# Patient Record
Sex: FEMALE | Race: WHITE | NOT HISPANIC OR LATINO | Employment: FULL TIME | URBAN - METROPOLITAN AREA
[De-identification: names, ages, dates, MRNs, and addresses within clinical notes are randomized per-mention and may not be internally consistent; named-entity substitution may affect disease eponyms.]

---

## 2018-08-01 ENCOUNTER — AMBULATORY - HEALTHEAST (OUTPATIENT)
Dept: MULTI SPECIALTY CLINIC | Facility: CLINIC | Age: 28
End: 2018-08-01

## 2018-08-01 ENCOUNTER — TRANSFERRED RECORDS (OUTPATIENT)
Dept: HEALTH INFORMATION MANAGEMENT | Facility: CLINIC | Age: 28
End: 2018-08-01

## 2018-08-01 LAB
PAP SMEAR - HIM PATIENT REPORTED: NEGATIVE
PAP SMEAR - HIM PATIENT REPORTED: NORMAL

## 2019-05-07 ENCOUNTER — OFFICE VISIT (OUTPATIENT)
Dept: FAMILY MEDICINE | Facility: CLINIC | Age: 29
End: 2019-05-07
Payer: COMMERCIAL

## 2019-05-07 VITALS
BODY MASS INDEX: 23 KG/M2 | OXYGEN SATURATION: 99 % | RESPIRATION RATE: 16 BRPM | WEIGHT: 125 LBS | SYSTOLIC BLOOD PRESSURE: 110 MMHG | HEIGHT: 62 IN | TEMPERATURE: 98 F | DIASTOLIC BLOOD PRESSURE: 61 MMHG | HEART RATE: 59 BPM

## 2019-05-07 DIAGNOSIS — L30.9 DERMATITIS: Primary | ICD-10-CM

## 2019-05-07 PROCEDURE — 99213 OFFICE O/P EST LOW 20 MIN: CPT | Performed by: NURSE PRACTITIONER

## 2019-05-07 RX ORDER — TRIAMCINOLONE ACETONIDE 1 MG/G
CREAM TOPICAL 2 TIMES DAILY
Qty: 15 G | Refills: 0 | Status: SHIPPED | OUTPATIENT
Start: 2019-05-07 | End: 2022-07-25

## 2019-05-07 RX ORDER — MUPIROCIN 20 MG/G
OINTMENT TOPICAL 3 TIMES DAILY
Qty: 1 G | Refills: 0 | Status: SHIPPED | OUTPATIENT
Start: 2019-05-07 | End: 2022-07-25

## 2019-05-07 ASSESSMENT — MIFFLIN-ST. JEOR: SCORE: 1255.25

## 2019-05-07 NOTE — PROGRESS NOTES
"  SUBJECTIVE:   Kate Coelho is a 29 year old female who presents to clinic today for the following   health issues:        Rash      Duration: Feb 20109    Description  Location: upper lip  Itching: mild    Intensity:  moderate    Accompanying signs and symptoms: None    History (similar episodes/previous evaluation): None    Some crusting occasionally, yellow tinged.    Dry and scaley  Some itching  No pain or tingling  Never blistered.    No fevers  No recent travel or ill exposures  No new lotions or soaps.     Moved here from [place] for  grad school.   Last PE in [place] 1 years ago.  PAP normal.    No meds daily.    Generally healthy        Additional history: as documented    Reviewed  and updated as needed this visit by clinical staff  Tobacco  Allergies  Meds  Problems  Med Hx  Surg Hx  Fam Hx  Soc Hx          Reviewed and updated as needed this visit by Provider  Tobacco  Allergies  Meds  Problems  Med Hx  Surg Hx  Fam Hx         ROS:  Constitutional, HEENT, cardiovascular, pulmonary, GI, , musculoskeletal, neuro, skin, endocrine and psych systems are negative, except as otherwise noted.    OBJECTIVE:     /61   Pulse 59   Temp 98  F (36.7  C) (Oral)   Resp 16   Ht 1.575 m (5' 2\")   Wt 56.7 kg (125 lb)   SpO2 99%   BMI 22.86 kg/m    Body mass index is 22.86 kg/m .  GENERAL: healthy, alert and no distress  RESP: lungs clear to auscultation - no rales, rhonchi or wheezes  CV: regular rate and rhythm, normal S1 S2, no S3 or S4, no murmur, click or rub, no peripheral edema and peripheral pulses strong  MS: no gross musculoskeletal defects noted, no edema  SKIN: pale pink macular patch on right lateral upper lip with flaking and excoriation      ASSESSMENT/PLAN:       ICD-10-CM    1. Dermatitis L30.9 mupirocin (BACTROBAN) 2 % external ointment     triamcinolone (KENALOG) 0.1 % external cream       Patient Instructions   Try antibiotic cream 1 tablet topically near mouth 3 times daily " x 1 week.    If ineffective stop this and start topical steroid 1 tablet topically near  mouth 3 times daily x 1 week.    If neither of these works will refer to dermatology for further eval.            YANET Andres CNP  [facility]

## 2019-05-07 NOTE — PATIENT INSTRUCTIONS
Try antibiotic cream 1 tablet topically near mouth 3 times daily x 1 week.    If ineffective stop this and start topical steroid 1 tablet topically near  mouth 3 times daily x 1 week.    If neither of these works will refer to dermatology for further eval.

## 2019-06-18 ENCOUNTER — TELEPHONE (OUTPATIENT)
Dept: FAMILY MEDICINE | Facility: CLINIC | Age: 29
End: 2019-06-18

## 2019-06-18 DIAGNOSIS — L30.9 DERMATITIS: Primary | ICD-10-CM

## 2019-06-19 ENCOUNTER — TRANSFERRED RECORDS (OUTPATIENT)
Dept: HEALTH INFORMATION MANAGEMENT | Facility: CLINIC | Age: 29
End: 2019-06-19

## 2019-10-28 ENCOUNTER — COMMUNICATION - HEALTHEAST (OUTPATIENT)
Dept: TELEHEALTH | Facility: CLINIC | Age: 29
End: 2019-10-28

## 2019-10-28 ENCOUNTER — OFFICE VISIT - HEALTHEAST (OUTPATIENT)
Dept: FAMILY MEDICINE | Facility: CLINIC | Age: 29
End: 2019-10-28

## 2019-10-28 DIAGNOSIS — R39.15 URGENCY OF URINATION: ICD-10-CM

## 2019-10-28 DIAGNOSIS — Z00.00 WELL FEMALE EXAM WITHOUT GYNECOLOGICAL EXAM: ICD-10-CM

## 2019-10-28 LAB
ALBUMIN UR-MCNC: ABNORMAL MG/DL
APPEARANCE UR: CLEAR
BILIRUB UR QL STRIP: ABNORMAL
COLOR UR AUTO: YELLOW
GLUCOSE UR STRIP-MCNC: NEGATIVE MG/DL
HGB UR QL STRIP: NEGATIVE
KETONES UR STRIP-MCNC: ABNORMAL MG/DL
LEUKOCYTE ESTERASE UR QL STRIP: ABNORMAL
NITRATE UR QL: NEGATIVE
PH UR STRIP: 6 [PH] (ref 5–8)
SP GR UR STRIP: >=1.03 (ref 1–1.03)
UROBILINOGEN UR STRIP-ACNC: ABNORMAL

## 2019-10-28 ASSESSMENT — MIFFLIN-ST. JEOR: SCORE: 1249.36

## 2019-10-29 LAB — BACTERIA SPEC CULT: NO GROWTH

## 2020-04-22 ENCOUNTER — COMMUNICATION - HEALTHEAST (OUTPATIENT)
Dept: SCHEDULING | Facility: CLINIC | Age: 30
End: 2020-04-22

## 2020-12-02 ENCOUNTER — COMMUNICATION - HEALTHEAST (OUTPATIENT)
Dept: FAMILY MEDICINE | Facility: CLINIC | Age: 30
End: 2020-12-02
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
## 2021-06-03 VITALS
RESPIRATION RATE: 20 BRPM | WEIGHT: 124.5 LBS | BODY MASS INDEX: 22.91 KG/M2 | HEART RATE: 100 BPM | HEIGHT: 62 IN | SYSTOLIC BLOOD PRESSURE: 118 MMHG | DIASTOLIC BLOOD PRESSURE: 74 MMHG

## 2021-06-07 NOTE — TELEPHONE ENCOUNTER
"Pt reports reports she had an ovarian cyst 2/10/20 and she is having similar symptoms. \"Sharp pain both sides of ribcage, L side more severe. Pt rates pain on L \"7\" for past two hours. Pt reports \"abdomen bloated the way it was before and tenderness in uterine area\".     Advised pt to go to the ER now per protocol due to persistant chest pain.     Pt verbalizes understanding and agrees to plan.     Reason for Disposition    Chest pain lasts > 5 minutes (Exceptions: chest pain occurring > 3 days ago and now asymptomatic; same as previously diagnosed heartburn and has accompanying sour taste in mouth)    Protocols used: CHEST PAIN-A-AH      "

## 2021-06-16 PROBLEM — R19.00 PELVIC MASS: Status: ACTIVE | Noted: 2020-02-10

## 2021-06-16 PROBLEM — R10.2 PELVIC PAIN: Status: ACTIVE | Noted: 2020-02-11

## 2021-07-06 ENCOUNTER — OFFICE VISIT (OUTPATIENT)
Dept: PEDIATRICS | Facility: CLINIC | Age: 31
End: 2021-07-06
Payer: COMMERCIAL

## 2021-07-06 VITALS
OXYGEN SATURATION: 98 % | RESPIRATION RATE: 16 BRPM | WEIGHT: 136.8 LBS | HEART RATE: 74 BPM | BODY MASS INDEX: 25.02 KG/M2 | DIASTOLIC BLOOD PRESSURE: 72 MMHG | TEMPERATURE: 97.1 F | SYSTOLIC BLOOD PRESSURE: 112 MMHG

## 2021-07-06 DIAGNOSIS — L42 PITYRIASIS ROSEA: Primary | ICD-10-CM

## 2021-07-06 PROCEDURE — 99213 OFFICE O/P EST LOW 20 MIN: CPT | Performed by: PHYSICIAN ASSISTANT

## 2021-07-06 NOTE — PROGRESS NOTES
Assessment & Plan     Pityriasis rosea  Discussed in detail. Avoid hot water. May take antihistamines PRN. Call if symptoms persist in one month given upcoming wedding.    DARVIN Aguero [facility] EVI Love is a 31 year old who presents for the following health issues:    HPI     Rash  Onset/Duration: x 1.5 weeks ago  Description  Location: started on back, and is now on torso   Character: linear, red  Itching: moderate  Intensity:  moderate  Progression of Symptoms:  worsening  Accompanying signs and symptoms:   Fever: no  Body aches or joint pain: no  Sore throat symptoms: no  Recent cold symptoms: no  History:           Previous episodes of similar rash: None  New exposures:  None  Recent travel: no  Exposure to similar rash: no  Precipitating or alleviating factors: none   Therapies tried and outcome: none    [place]     Patient getting  on Aug 20    Review of Systems   Constitutional, HEENT, cardiovascular, pulmonary, gi and gu systems are negative, except as otherwise noted.      Objective    /72 (BP Location: Right arm, Patient Position: Sitting, Cuff Size: Adult Regular)   Pulse 74   Temp 97.1  F (36.2  C) (Tympanic)   Resp 16   Wt 62.1 kg (136 lb 12.8 oz)   SpO2 98%   BMI 25.02 kg/m    Body mass index is 25.02 kg/m .  Physical Exam   GENERAL: healthy, alert and no distress  EYES: Eyes grossly normal to inspection, PERRL and conjunctivae and sclerae normal  SKIN: inspection of the left upper back reveals large, mildly erythemic, oval shaped lesion. Multiple smaller lesions of the anterior chest, torso and left back    No results found for this or any previous visit (from the past 24 hour(s)).

## 2021-08-15 ENCOUNTER — HEALTH MAINTENANCE LETTER (OUTPATIENT)
Age: 31
End: 2021-08-15

## 2022-05-21 ENCOUNTER — E-VISIT (OUTPATIENT)
Dept: URGENT CARE | Facility: CLINIC | Age: 32
End: 2022-05-21
Payer: COMMERCIAL

## 2022-05-21 DIAGNOSIS — Z20.822 SUSPECTED COVID-19 VIRUS INFECTION: Primary | ICD-10-CM

## 2022-05-21 PROCEDURE — 99421 OL DIG E/M SVC 5-10 MIN: CPT | Performed by: PHYSICIAN ASSISTANT

## 2022-05-21 NOTE — PATIENT INSTRUCTIONS
Dear Beverly,    Based on your responses, you may have coronavirus (COVID-19). This illness can cause fever, cough and trouble breathing. Many people get a mild case and get better on their own. Some people can get very sick.    Will I be tested for COVID-19?  No since you have already tested positive for COVID.    Return to work guidance:  Please let your workplace manager and staffing office know when your isolation ends. Note: if you tested through [facility], there is no need to report to [facility]. If you did not test through [facility], send a copy of your results to [email or web address]. [place] call [phone], [place] call [phone].     Please visit the Employee COVID-19 Testing Information page on the COVID-19 Sz[employer] site. Here you will find return to work and testing guidance, high and low risk exposure definitions, and frequently asked questions.   [employer] URL: [email or web address]     How can I take care of myself?  Over the counter medications may help with your symptoms such as runny or stuffy nose, cough, chills, or fever.  Talk to your care team about your options.     Some people are at high risk of severe illness (for example, you have a weak immune system, you re 65 years or older, or you have certain medical problems). If your risk is high and your symptoms started in the last 5 to 7 days, we strongly recommend for you to get COVID treatment as soon as possible. Paxlovid, Molnupiravir and the monoclonal antibody treatments are proven safe and effective, make you feel better faster, and prevent hospitalization and death.       To schedule an appointment to discuss COVID treatment, request an appointment on [employer] (select  COVID-19 Treatment ) or call [employer] ([phone]), press 7.      Get lots of rest. Drink extra fluids (unless a doctor has told you not to)    Take Tylenol  (acetaminophen) or ibuprofen for fever or pain. If you have liver or kidney problems, ask your family doctor if it's okay to take Tylenol o ibuprofen    Take over the counter medications for your symptoms, as directed by your doctor. You may also talk to your pharmacist.      If you have other health problems (like cancer, heart failure, an organ transplant or severe kidney disease): Call your specialty clinic if you don't feel better in the next 2 days.    Know when to call 911. Emergency warning signs include:  o Trouble breathing or shortness of breath  o Pain or pressure in the chest that doesn't go away  o Feeling confused like you haven't felt before, or not being able to wake up  o Bluish-colored lips or face    Where can I get more information?    [facility] - About COVID-19: [email or web address]     CDC - What to Do If You're Sick: www.cdc.gov/coronavirus/2019-ncov/about/steps-when-sick.html    CDC - Ending Home Isolation: https://www.cdc.gov/coronavirus/2019-ncov/your-health/quarantine-isolation.html     [facility] clinical trials (COVID-19 research studies): [email or web address]    Below are the COVID-19 hotlines at the [facility] of Health ([facility]). Interpreters are available.  o For health questions: Call 619-906-9193 or 1-875.113.5589 (7 a.m. to 7 p.m.)  o For questions about schools and childcare: Call 331-418-9308 or 1-463.219.1308 (7 a.m. to 7 p.m.)    May 21, 2022  RE:  Kate VAZQUEZ Mel[place]                                                                                                                  [address]   [facility] [address]      To whom it may concern:    I evaluated Kate Coelho on May 21, 2022. Kate Coelho should be excused from work/school.     They should let their workplace manager and staffing office know when their quarantine ends.    We can not give an exact date as it depends on the information below. They  can calculate this on their own or work with their manager/staffing office to calculate this. (For example if they were exposed on 10/04, they would have to quarantine for 14 full days. That would be through 10/18. They could return on 10/19.)    Quarantine Guidelines:      If patient receives a positive COVID-19 test result, they should follow the guidance of those who are giving the results. Usually the return to work is 10 (or in some cases 20 days from symptom onset.) If they work at [employer], they must be cleared by Employee Occupational Health and Safety to return to work.        If patient receives a negative COVID-19 test result and did not have a high risk exposure to someone with a known positive COVID-19 test, they can return to work once they're free of fever for 24 hours without fever-reducing medication and their symptoms are improving or resolved.      If patient receives a negative COVID-19 test and if they had a high risk exposure to someone who has tested positive for COVID-19 then they can return to work 14 days after their last contact with the positive individual    Note: If there is ongoing exposure to the covid positive person, this quarantine period may be longer than 14 days. (For example, if they are continually exposed to their child, who tested positive and cannot isolate from them, then the quarantine of 7-14 days can't start until their child is no longer contagious. This is typically 10 days from onset to the child's symptoms. So the total duration may be 17-24 days in this case.)     Sincerely,  Sohail Yoder PA-C

## 2022-07-22 SDOH — ECONOMIC STABILITY: TRANSPORTATION INSECURITY
IN THE PAST 12 MONTHS, HAS LACK OF TRANSPORTATION KEPT YOU FROM MEETINGS, WORK, OR FROM GETTING THINGS NEEDED FOR DAILY LIVING?: NO

## 2022-07-22 SDOH — ECONOMIC STABILITY: FOOD INSECURITY: WITHIN THE PAST 12 MONTHS, THE FOOD YOU BOUGHT JUST DIDN'T LAST AND YOU DIDN'T HAVE MONEY TO GET MORE.: NEVER TRUE

## 2022-07-22 SDOH — ECONOMIC STABILITY: TRANSPORTATION INSECURITY
IN THE PAST 12 MONTHS, HAS THE LACK OF TRANSPORTATION KEPT YOU FROM MEDICAL APPOINTMENTS OR FROM GETTING MEDICATIONS?: NO

## 2022-07-22 SDOH — ECONOMIC STABILITY: INCOME INSECURITY: HOW HARD IS IT FOR YOU TO PAY FOR THE VERY BASICS LIKE FOOD, HOUSING, MEDICAL CARE, AND HEATING?: NOT VERY HARD

## 2022-07-22 SDOH — ECONOMIC STABILITY: INCOME INSECURITY: IN THE LAST 12 MONTHS, WAS THERE A TIME WHEN YOU WERE NOT ABLE TO PAY THE MORTGAGE OR RENT ON TIME?: NO

## 2022-07-22 SDOH — HEALTH STABILITY: PHYSICAL HEALTH: ON AVERAGE, HOW MANY MINUTES DO YOU ENGAGE IN EXERCISE AT THIS LEVEL?: 30 MIN

## 2022-07-22 SDOH — ECONOMIC STABILITY: FOOD INSECURITY: WITHIN THE PAST 12 MONTHS, YOU WORRIED THAT YOUR FOOD WOULD RUN OUT BEFORE YOU GOT MONEY TO BUY MORE.: NEVER TRUE

## 2022-07-22 SDOH — HEALTH STABILITY: PHYSICAL HEALTH: ON AVERAGE, HOW MANY DAYS PER WEEK DO YOU ENGAGE IN MODERATE TO STRENUOUS EXERCISE (LIKE A BRISK WALK)?: 5 DAYS

## 2022-07-22 ASSESSMENT — ENCOUNTER SYMPTOMS
JOINT SWELLING: 0
COUGH: 0
HEMATOCHEZIA: 0
DIZZINESS: 0
BREAST MASS: 0
CHILLS: 0
SORE THROAT: 0
DYSURIA: 0
DIARRHEA: 0
PARESTHESIAS: 0
ARTHRALGIAS: 1
EYE PAIN: 0
PALPITATIONS: 0
HEADACHES: 1
SHORTNESS OF BREATH: 0
NERVOUS/ANXIOUS: 0
MYALGIAS: 0
CONSTIPATION: 0
HEMATURIA: 0
NAUSEA: 0
WEAKNESS: 0
FEVER: 0
FREQUENCY: 0
HEARTBURN: 0
ABDOMINAL PAIN: 0

## 2022-07-22 ASSESSMENT — SOCIAL DETERMINANTS OF HEALTH (SDOH)
DO YOU BELONG TO ANY CLUBS OR ORGANIZATIONS SUCH AS CHURCH GROUPS UNIONS, FRATERNAL OR ATHLETIC GROUPS, OR SCHOOL GROUPS?: YES
HOW OFTEN DO YOU GET TOGETHER WITH FRIENDS OR RELATIVES?: ONCE A WEEK
IN A TYPICAL WEEK, HOW MANY TIMES DO YOU TALK ON THE PHONE WITH FAMILY, FRIENDS, OR NEIGHBORS?: MORE THAN THREE TIMES A WEEK
HOW OFTEN DO YOU ATTEND CHURCH OR RELIGIOUS SERVICES?: NEVER

## 2022-07-22 ASSESSMENT — LIFESTYLE VARIABLES
SKIP TO QUESTIONS 9-10: 0
HOW OFTEN DO YOU HAVE SIX OR MORE DRINKS ON ONE OCCASION: LESS THAN MONTHLY
HOW MANY STANDARD DRINKS CONTAINING ALCOHOL DO YOU HAVE ON A TYPICAL DAY: 1 OR 2
AUDIT-C TOTAL SCORE: 2
HOW OFTEN DO YOU HAVE A DRINK CONTAINING ALCOHOL: MONTHLY OR LESS

## 2022-07-25 ENCOUNTER — OFFICE VISIT (OUTPATIENT)
Dept: PEDIATRICS | Facility: CLINIC | Age: 32
End: 2022-07-25
Payer: COMMERCIAL

## 2022-07-25 VITALS
SYSTOLIC BLOOD PRESSURE: 120 MMHG | WEIGHT: 145 LBS | OXYGEN SATURATION: 97 % | RESPIRATION RATE: 16 BRPM | TEMPERATURE: 98.8 F | HEART RATE: 84 BPM | BODY MASS INDEX: 25.69 KG/M2 | DIASTOLIC BLOOD PRESSURE: 68 MMHG | HEIGHT: 63 IN

## 2022-07-25 DIAGNOSIS — M54.2 CHRONIC NECK PAIN: ICD-10-CM

## 2022-07-25 DIAGNOSIS — G89.29 CHRONIC NECK PAIN: ICD-10-CM

## 2022-07-25 DIAGNOSIS — Z12.4 CERVICAL CANCER SCREENING: ICD-10-CM

## 2022-07-25 DIAGNOSIS — Z00.00 ROUTINE GENERAL MEDICAL EXAMINATION AT A HEALTH CARE FACILITY: Primary | ICD-10-CM

## 2022-07-25 PROBLEM — R19.00 PELVIC MASS: Status: RESOLVED | Noted: 2020-02-10 | Resolved: 2022-07-25

## 2022-07-25 PROBLEM — R10.2 PELVIC PAIN: Status: RESOLVED | Noted: 2020-02-11 | Resolved: 2022-07-25

## 2022-07-25 PROCEDURE — 87624 HPV HI-RISK TYP POOLED RSLT: CPT | Performed by: NURSE PRACTITIONER

## 2022-07-25 PROCEDURE — 90715 TDAP VACCINE 7 YRS/> IM: CPT | Performed by: NURSE PRACTITIONER

## 2022-07-25 PROCEDURE — G0124 SCREEN C/V THIN LAYER BY MD: HCPCS | Performed by: PATHOLOGY

## 2022-07-25 PROCEDURE — G0145 SCR C/V CYTO,THINLAYER,RESCR: HCPCS | Performed by: NURSE PRACTITIONER

## 2022-07-25 PROCEDURE — 90471 IMMUNIZATION ADMIN: CPT | Performed by: NURSE PRACTITIONER

## 2022-07-25 PROCEDURE — 99395 PREV VISIT EST AGE 18-39: CPT | Mod: 25 | Performed by: NURSE PRACTITIONER

## 2022-07-25 ASSESSMENT — ENCOUNTER SYMPTOMS
ARTHRALGIAS: 1
CHILLS: 0
ABDOMINAL PAIN: 0
MYALGIAS: 0
HEARTBURN: 0
NERVOUS/ANXIOUS: 0
HEMATOCHEZIA: 0
DIARRHEA: 0
PARESTHESIAS: 0
BREAST MASS: 0
DIZZINESS: 0
SORE THROAT: 0
PALPITATIONS: 0
CONSTIPATION: 0
HEADACHES: 1
WEAKNESS: 0
FREQUENCY: 0
SHORTNESS OF BREATH: 0
DYSURIA: 0
HEMATURIA: 0
FEVER: 0
JOINT SWELLING: 0
COUGH: 0
EYE PAIN: 0
NAUSEA: 0

## 2022-07-25 ASSESSMENT — PAIN SCALES - GENERAL: PAINLEVEL: NO PAIN (0)
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
## 2022-07-27 ENCOUNTER — MYC MEDICAL ADVICE (OUTPATIENT)
Dept: PEDIATRICS | Facility: CLINIC | Age: 32
End: 2022-07-27

## 2022-07-27 LAB
BKR LAB AP GYN ADEQUACY: ABNORMAL
BKR LAB AP GYN INTERPRETATION: ABNORMAL
BKR LAB AP HPV REFLEX: ABNORMAL
BKR LAB AP PREVIOUS ABNORMAL: ABNORMAL
PATH REPORT.COMMENTS IMP SPEC: ABNORMAL
PATH REPORT.COMMENTS IMP SPEC: ABNORMAL
PATH REPORT.RELEVANT HX SPEC: ABNORMAL

## 2022-07-29 LAB
HUMAN PAPILLOMA VIRUS 16 DNA: NEGATIVE
HUMAN PAPILLOMA VIRUS 18 DNA: NEGATIVE
HUMAN PAPILLOMA VIRUS FINAL DIAGNOSIS: ABNORMAL
HUMAN PAPILLOMA VIRUS OTHER HR: POSITIVE

## 2022-08-01 ENCOUNTER — PATIENT OUTREACH (OUTPATIENT)
Dept: PEDIATRICS | Facility: CLINIC | Age: 32
End: 2022-08-01

## 2022-08-01 DIAGNOSIS — R87.611 PAP SMEAR OF CERVIX WITH ASCUS, CANNOT EXCLUDE HGSIL: ICD-10-CM

## 2022-09-18 ENCOUNTER — HEALTH MAINTENANCE LETTER (OUTPATIENT)
Age: 32
End: 2022-09-18

## 2022-10-12 DIAGNOSIS — F41.9 ANXIETY: Primary | ICD-10-CM

## 2022-10-12 RX ORDER — DIAZEPAM 5 MG
5 TABLET ORAL ONCE
Status: DISCONTINUED | OUTPATIENT
Start: 2022-10-12 | End: 2022-12-13

## 2022-10-19 ENCOUNTER — OFFICE VISIT (OUTPATIENT)
Dept: OBGYN | Facility: CLINIC | Age: 32
End: 2022-10-19
Payer: COMMERCIAL

## 2022-10-19 VITALS — SYSTOLIC BLOOD PRESSURE: 134 MMHG | WEIGHT: 152.5 LBS | BODY MASS INDEX: 27.01 KG/M2 | DIASTOLIC BLOOD PRESSURE: 72 MMHG

## 2022-10-19 DIAGNOSIS — R87.611 PAP SMEAR OF CERVIX WITH ASCUS, CANNOT EXCLUDE HGSIL: Primary | ICD-10-CM

## 2022-10-19 LAB — HCG IFA URINE: NEGATIVE

## 2022-10-19 PROCEDURE — 84703 CHORIONIC GONADOTROPIN ASSAY: CPT | Performed by: OBSTETRICS & GYNECOLOGY

## 2022-10-19 PROCEDURE — 88305 TISSUE EXAM BY PATHOLOGIST: CPT | Performed by: PATHOLOGY

## 2022-10-19 PROCEDURE — 57455 BIOPSY OF CERVIX W/SCOPE: CPT | Performed by: OBSTETRICS & GYNECOLOGY

## 2022-10-19 RX ORDER — NAPROXEN 500 MG/1
TABLET ORAL
COMMUNITY
Start: 2022-09-06 | End: 2023-11-14

## 2022-10-19 NOTE — PROGRESS NOTES
32 year old P0 presents for colposcopy.      Indication for procedure:Atypical Squamous Cells, Cannot Exclude HSIL (ASC-H) w/ +HPV other  Prior history of cervical dysplasia: No    First abnormal screening    Prior Colposcopy history: No  Prior LEEP:No  Patient's last menstrual period was 10/01/2022 (exact date).    Tobacco: No  Gardasil vaccination status:Yes   Pregnancy test: Negative      Discussed nature of HPV related infection, natural history and association with cervical dysplasia.  Procedure for colposcopy and biopsy was explained to the patient and consent obtained.  All the patient's questions were answered.    PROCEDURE:  COLPOSCOPY  After a procedural timeout was taken, she was positioned in dorsal lithotomy and a speculum was inserted to allow visualization of the cervix. A 5% acetic acid solution was applied to the ectocervix with large swabs. Lugols solution was also applied.  Colposcopic examination was then undertaken of the cervix, distal vaginal canal and vaginal fornices.    FINDINGS:Physical Exam  Genitourinary:              Procedures      Plan: Specimens labelled and sent to pathology., Will base further treatment on pathology findings. and post biopsy instructions given to patient.      Daniel Muñoz MD

## 2022-10-24 LAB
PATH REPORT.COMMENTS IMP SPEC: NORMAL
PATH REPORT.COMMENTS IMP SPEC: NORMAL
PATH REPORT.FINAL DX SPEC: NORMAL
PATH REPORT.GROSS SPEC: NORMAL
PATH REPORT.MICROSCOPIC SPEC OTHER STN: NORMAL
PATH REPORT.RELEVANT HX SPEC: NORMAL
PHOTO IMAGE: NORMAL

## 2022-10-25 ENCOUNTER — TELEPHONE (OUTPATIENT)
Dept: OBGYN | Facility: CLINIC | Age: 32
End: 2022-10-25

## 2022-10-25 DIAGNOSIS — D06.9 HIGH GRADE SQUAMOUS INTRAEPITHELIAL LESION (HGSIL), GRADE 3 CIN, ON BIOPSY OF CERVIX: Primary | ICD-10-CM

## 2022-10-25 NOTE — TELEPHONE ENCOUNTER
Type of surgery: leep  Location of surgery: [facility]  Date and time of surgery: 11/8/22 @ 7:30 am  Surgeon: Dr. Muñoz  Pre-Op Appt Date: Patient advised to schedule.  Post-Op Appt Date:    Packet sent out: Yes  Pre-cert/Authorization completed:  No  Date: 10/25/22

## 2022-10-25 NOTE — TELEPHONE ENCOUNTER
Patient Name: Kate Coelho   MRN: [id number]   Case#: [id number]   Surgeon(s) and Role:      * Daniel Muñoz MD - Primary   Date requested: * No dates entered *   Location:  OR   Procedure(s):   CONE BIOPSY, CERVIX, USING LOOP ELECTROSURGICAL EXCISION PROCEDURE (LEEP)

## 2022-11-02 ENCOUNTER — OFFICE VISIT (OUTPATIENT)
Dept: PEDIATRICS | Facility: CLINIC | Age: 32
End: 2022-11-02
Payer: COMMERCIAL

## 2022-11-02 VITALS
BODY MASS INDEX: 26.65 KG/M2 | SYSTOLIC BLOOD PRESSURE: 117 MMHG | OXYGEN SATURATION: 99 % | WEIGHT: 150.4 LBS | HEIGHT: 63 IN | HEART RATE: 76 BPM | TEMPERATURE: 97.7 F | DIASTOLIC BLOOD PRESSURE: 72 MMHG | RESPIRATION RATE: 18 BRPM

## 2022-11-02 DIAGNOSIS — Z01.818 PREOPERATIVE EXAMINATION: Primary | ICD-10-CM

## 2022-11-02 DIAGNOSIS — G89.29 CHRONIC NECK PAIN: ICD-10-CM

## 2022-11-02 DIAGNOSIS — M54.2 CHRONIC NECK PAIN: ICD-10-CM

## 2022-11-02 DIAGNOSIS — D06.9 HIGH GRADE SQUAMOUS INTRAEPITHELIAL LESION (HGSIL), GRADE 3 CIN, ON BIOPSY OF CERVIX: ICD-10-CM

## 2022-11-02 LAB — HCG UR QL: NEGATIVE

## 2022-11-02 PROCEDURE — 81025 URINE PREGNANCY TEST: CPT | Performed by: NURSE PRACTITIONER

## 2022-11-02 PROCEDURE — 99213 OFFICE O/P EST LOW 20 MIN: CPT | Performed by: NURSE PRACTITIONER

## 2022-11-02 ASSESSMENT — PAIN SCALES - GENERAL: PAINLEVEL: NO PAIN (0)
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
## 2022-11-08 ENCOUNTER — TRANSFERRED RECORDS (OUTPATIENT)
Dept: HEALTH INFORMATION MANAGEMENT | Facility: CLINIC | Age: 32
End: 2022-11-08

## 2022-11-08 ENCOUNTER — LAB REQUISITION (OUTPATIENT)
Dept: LAB | Facility: CLINIC | Age: 32
End: 2022-11-08
Payer: COMMERCIAL

## 2022-11-08 DIAGNOSIS — D06.9 CARCINOMA IN SITU OF CERVIX, UNSPECIFIED: ICD-10-CM

## 2022-11-08 PROCEDURE — 88307 TISSUE EXAM BY PATHOLOGIST: CPT | Mod: 26 | Performed by: STUDENT IN AN ORGANIZED HEALTH CARE EDUCATION/TRAINING PROGRAM

## 2022-11-08 PROCEDURE — G0416 PROSTATE BIOPSY, ANY MTHD: HCPCS | Mod: 26 | Performed by: STUDENT IN AN ORGANIZED HEALTH CARE EDUCATION/TRAINING PROGRAM

## 2022-11-08 PROCEDURE — 88305 TISSUE EXAM BY PATHOLOGIST: CPT | Mod: TC,ORL | Performed by: OBSTETRICS & GYNECOLOGY

## 2022-11-17 ENCOUNTER — PATIENT OUTREACH (OUTPATIENT)
Dept: OBGYN | Facility: CLINIC | Age: 32
End: 2022-11-17

## 2022-11-17 NOTE — TELEPHONE ENCOUNTER
10/19/22 [place] Bx - KAELA 2-3. Plan LEEP   11/8/22 LEEP Bx - KAELA 2-3, neg margins, ECC negative. Plan cotest in 6 months.

## 2022-12-13 ENCOUNTER — OFFICE VISIT (OUTPATIENT)
Dept: OBGYN | Facility: CLINIC | Age: 32
End: 2022-12-13
Payer: COMMERCIAL

## 2022-12-13 VITALS — SYSTOLIC BLOOD PRESSURE: 136 MMHG | WEIGHT: 155.1 LBS | DIASTOLIC BLOOD PRESSURE: 74 MMHG | BODY MASS INDEX: 27.47 KG/M2

## 2022-12-13 DIAGNOSIS — Z48.89 ENCOUNTER FOR POSTOPERATIVE WOUND CHECK: Primary | ICD-10-CM

## 2022-12-13 DIAGNOSIS — D06.9 CIN III WITH SEVERE DYSPLASIA: ICD-10-CM

## 2022-12-13 PROCEDURE — 99024 POSTOP FOLLOW-UP VISIT: CPT | Performed by: OBSTETRICS & GYNECOLOGY

## 2022-12-13 NOTE — PROGRESS NOTES
Post Op Follow Up    Kate Coelho is here for post op follow up visit following LEEP on 11/8/2022.    Procedure was uncomplicated.  Final pathology demonstrated     Final Diagnosis   A.  Endocervix, curettage:  -Fragments of benign endocervical glands.    B.  Cervix, 6:00 and 7:00, loop electrosurgical excision procedure (LEEP):  -High-grade squamous intraepithelial lesion (KAELA 2-3).  -Surgical margins are negative for dysplasia.         Since surgery patient has had no problems.    Activity, bowel function and bladder function have all returned to normal.    EXAM:  : Normal external genitalia without lesions, vagina neg.  Cervix well healed TZ visible     Follow up in 6 month(s) for Pap/HPV testing    Jorge Muñoz MD

## 2023-05-16 ENCOUNTER — OFFICE VISIT (OUTPATIENT)
Dept: OBGYN | Facility: CLINIC | Age: 33
End: 2023-05-16
Payer: COMMERCIAL

## 2023-05-16 VITALS — DIASTOLIC BLOOD PRESSURE: 70 MMHG | SYSTOLIC BLOOD PRESSURE: 122 MMHG | WEIGHT: 158 LBS | BODY MASS INDEX: 27.99 KG/M2

## 2023-05-16 DIAGNOSIS — D06.9 CIN III WITH SEVERE DYSPLASIA: Primary | ICD-10-CM

## 2023-05-16 PROCEDURE — 88175 CYTOPATH C/V AUTO FLUID REDO: CPT | Performed by: OBSTETRICS & GYNECOLOGY

## 2023-05-16 PROCEDURE — 87624 HPV HI-RISK TYP POOLED RSLT: CPT | Performed by: OBSTETRICS & GYNECOLOGY

## 2023-05-16 PROCEDURE — 99213 OFFICE O/P EST LOW 20 MIN: CPT | Performed by: OBSTETRICS & GYNECOLOGY

## 2023-05-16 NOTE — PROGRESS NOTES
Chief Complaint   Patient presents with     Repeat Pap Smear       Subjective:  33-year-old status post LEEP in November 2022 with final path showing KAELA-2 to 3 with negative margins and a benign ECC.  She presents today for a follow-up Pap 6 months out from her original procedure.      REVIEW OF SYSTEMS:  General: negative    Health Maintenance   Topic Date Due     COVID-19 Vaccine (4 - Pfizer series) 12/10/2021     PHQ-2 (once per calendar year)  01/01/2023     PAP FOLLOW-UP  05/08/2023     HPV FOLLOW-UP  05/08/2023     YEARLY PREVENTIVE VISIT  07/25/2023     ADVANCE CARE PLANNING  07/25/2027     DTAP/TDAP/TD IMMUNIZATION (8 - Td or Tdap) 07/25/2032     INFLUENZA VACCINE  Completed     HEPATITIS B IMMUNIZATION  Completed     Pneumococcal Vaccine: Pediatrics (0 to 5 Years) and At-Risk Patients (6 to 64 Years)  Aged Out     IPV IMMUNIZATION  Aged Out     MENINGITIS IMMUNIZATION  Aged Out     HEPATITIS C SCREENING  Discontinued     HIV SCREENING  Discontinued     PAP  Discontinued       Allergies   Allergen Reactions     Codeine Shortness Of Breath, Nausea and Vomiting and Rash     Doxycycline Dizziness and Rash       Objective:  Vitals: Wt 71.7 kg (158 lb)   BMI 27.99 kg/m    BMI= Body mass index is 27.99 kg/m .    Cervix normal in appearance.  Excellent healing post LEEP.  TZ still visibile.  No stenosis.  Pap/HPV obtained.    Assessment/Plan:  1. KAELA III with severe dysplasia  Status post LEEP in November 2022 with final path showing KAELA 2-3 with negative margins and a benign ECC.  Interval 6-month Pap smear and HPV screen today        Jorge Muñoz MD

## 2023-05-18 LAB
BKR LAB AP GYN ADEQUACY: NORMAL
BKR LAB AP GYN INTERPRETATION: NORMAL
BKR LAB AP HPV REFLEX: NORMAL
BKR LAB AP PREVIOUS ABNL DX: NORMAL
BKR LAB AP PREVIOUS ABNORMAL: NORMAL
PATH REPORT.COMMENTS IMP SPEC: NORMAL
PATH REPORT.COMMENTS IMP SPEC: NORMAL
PATH REPORT.RELEVANT HX SPEC: NORMAL

## 2023-05-22 ENCOUNTER — PATIENT OUTREACH (OUTPATIENT)
Dept: OBGYN | Facility: CLINIC | Age: 33
End: 2023-05-22
Payer: COMMERCIAL

## 2023-05-22 LAB
HUMAN PAPILLOMA VIRUS 16 DNA: NEGATIVE
HUMAN PAPILLOMA VIRUS 18 DNA: NEGATIVE
HUMAN PAPILLOMA VIRUS FINAL DIAGNOSIS: NORMAL
HUMAN PAPILLOMA VIRUS OTHER HR: NEGATIVE

## 2023-06-06 ENCOUNTER — OFFICE VISIT (OUTPATIENT)
Dept: FAMILY MEDICINE | Facility: CLINIC | Age: 33
End: 2023-06-06
Payer: COMMERCIAL

## 2023-06-06 VITALS
OXYGEN SATURATION: 97 % | RESPIRATION RATE: 18 BRPM | HEART RATE: 74 BPM | DIASTOLIC BLOOD PRESSURE: 67 MMHG | SYSTOLIC BLOOD PRESSURE: 118 MMHG | WEIGHT: 159.5 LBS | BODY MASS INDEX: 28.26 KG/M2 | HEIGHT: 63 IN | TEMPERATURE: 98 F

## 2023-06-06 DIAGNOSIS — R09.81 NASAL CONGESTION: ICD-10-CM

## 2023-06-06 DIAGNOSIS — J30.9 ALLERGIC RHINITIS, UNSPECIFIED SEASONALITY, UNSPECIFIED TRIGGER: Primary | ICD-10-CM

## 2023-06-06 PROCEDURE — 99213 OFFICE O/P EST LOW 20 MIN: CPT | Performed by: FAMILY MEDICINE

## 2023-06-06 RX ORDER — FLUTICASONE PROPIONATE 50 MCG
1 SPRAY, SUSPENSION (ML) NASAL DAILY
Qty: 16 G | Refills: 1 | Status: SHIPPED | OUTPATIENT
Start: 2023-06-06 | End: 2024-02-12

## 2023-06-06 RX ORDER — MONTELUKAST SODIUM 10 MG/1
10 TABLET ORAL AT BEDTIME
Qty: 30 TABLET | Refills: 0 | Status: SHIPPED | OUTPATIENT
Start: 2023-06-06 | End: 2023-12-22

## 2023-06-06 ASSESSMENT — ENCOUNTER SYMPTOMS
SINUS PAIN: 1
ACTIVITY CHANGE: 0
AGITATION: 0
RHINORRHEA: 1
ABDOMINAL DISTENTION: 0
SINUS PRESSURE: 1
ABDOMINAL PAIN: 0
APPETITE CHANGE: 1

## 2023-06-06 ASSESSMENT — PAIN SCALES - GENERAL: PAINLEVEL: NO PAIN (0)

## 2023-06-06 NOTE — PROGRESS NOTES
"  Assessment & Plan     Allergic rhinitis, unspecified seasonality, unspecified trigger  - fluticasone (FLONASE) 50 MCG/ACT nasal spray; Spray 1 spray into both nostrils daily  - montelukast (SINGULAIR) 10 MG tablet; Take 1 tablet (10 mg) by mouth At Bedtime    Nasal congestion  TM dysfunction due to nasal congestion, we discussed about using Claritin, Flonase, montelukast, recommend to use steam, normal saline nasal drops.  Recommend to contact if left ear pain due to nasal congestion fails to improve we can consider prednisone.       BMI:   Estimated body mass index is 28.25 kg/m  as calculated from the following:    Height as of this encounter: 1.6 m (5' 3\").    Weight as of this encounter: 72.3 kg (159 lb 8 oz).   Weight management plan: Discussed healthy diet and exercise guidelines      Jaimee Gomez MD  [facility] [place]IO Paul is a 33 year old, presenting for the following health issues:  Ear Problem (Left ear pain. )        6/6/2023     3:52 PM   Additional Questions   Roomed by Frances GEIGER     History of Present Illness       Reason for visit:  Tinnitus and ear pain  Symptom onset:  1-3 days ago  Symptoms include:  Loud ringing, pressure, and pain in left ear  Symptom intensity:  Moderate  Symptom progression:  Staying the same  Had these symptoms before:  No  What makes it worse:  Being in the car and mechanical noises like air conditioners  What makes it better:  Noise canceling earbuds, ibuprofen    She eats 2-3 servings of fruits and vegetables daily.She consumes 0 sweetened beverage(s) daily.She exercises with enough effort to increase her heart rate 20 to 29 minutes per day.  She exercises with enough effort to increase her heart rate 3 or less days per week.   She is taking medications regularly.         Review of Systems   Constitutional: Positive for appetite change. Negative for activity change.   HENT: Positive for congestion, rhinorrhea, sinus pressure and sinus " "pain.    Gastrointestinal: Negative for abdominal distention and abdominal pain.   Psychiatric/Behavioral: Negative for agitation and behavioral problems.          Objective    /67 (BP Location: Right arm, Patient Position: Sitting, Cuff Size: Adult Regular)   Pulse 74   Temp 98  F (36.7  C) (Oral)   Resp 18   Ht 1.6 m (5' 3\")   Wt 72.3 kg (159 lb 8 oz)   LMP 06/02/2023 (Exact Date)   SpO2 97%   BMI 28.25 kg/m    Body mass index is 28.25 kg/m .  Physical Exam  HENT:      Right Ear: Tympanic membrane and external ear normal.      Left Ear: External ear normal.      Ears:      Comments: Bulging TM .  Cardiovascular:      Rate and Rhythm: Normal rate.   Pulmonary:      Effort: Pulmonary effort is normal.   Abdominal:      General: Abdomen is flat.   Psychiatric:         Mood and Affect: Mood normal.                    "

## 2023-10-08 ENCOUNTER — HEALTH MAINTENANCE LETTER (OUTPATIENT)
Age: 33
End: 2023-10-08

## 2023-11-14 ENCOUNTER — OFFICE VISIT (OUTPATIENT)
Dept: URGENT CARE | Facility: URGENT CARE | Age: 33
End: 2023-11-14
Payer: COMMERCIAL

## 2023-11-14 VITALS
TEMPERATURE: 98.1 F | WEIGHT: 160 LBS | DIASTOLIC BLOOD PRESSURE: 82 MMHG | OXYGEN SATURATION: 98 % | HEART RATE: 78 BPM | SYSTOLIC BLOOD PRESSURE: 128 MMHG | BODY MASS INDEX: 28.34 KG/M2 | RESPIRATION RATE: 14 BRPM

## 2023-11-14 DIAGNOSIS — R11.2 NAUSEA AND VOMITING, UNSPECIFIED VOMITING TYPE: Primary | ICD-10-CM

## 2023-11-14 PROCEDURE — 99213 OFFICE O/P EST LOW 20 MIN: CPT | Performed by: PHYSICIAN ASSISTANT

## 2023-11-14 RX ORDER — ONDANSETRON 4 MG/1
4 TABLET, ORALLY DISINTEGRATING ORAL ONCE
Status: COMPLETED | OUTPATIENT
Start: 2023-11-14 | End: 2023-11-14

## 2023-11-14 RX ORDER — ONDANSETRON 4 MG/1
4 TABLET, ORALLY DISINTEGRATING ORAL EVERY 8 HOURS PRN
Qty: 30 TABLET | Refills: 0 | Status: SHIPPED | OUTPATIENT
Start: 2023-11-14 | End: 2023-12-22

## 2023-11-14 RX ADMIN — ONDANSETRON 4 MG: 4 TABLET, ORALLY DISINTEGRATING ORAL at 20:14

## 2023-11-15 NOTE — PROGRESS NOTES
Assessment & Plan     Nausea and vomiting, unspecified vomiting type  Acute problem.  On exam patient is in no acute distress.  Vitals are stable.  Discussed mild gastroenteritis.  She is given 4 mg of Zofran po here today with mild improvement in symptoms.  Zofran prescription sent to the pharmacy.  Recommended pushing fluids.  [place] diet for the next few days.  Will anticipate gradual improvement.  Follow-up if any worsening symptoms.  Patient agrees.  - ondansetron (ZOFRAN ODT) ODT tab 4 mg  - ondansetron (ZOFRAN ODT) 4 MG ODT tab  Dispense: 30 tablet; Refill: 0       Return in about 5 days (around 11/19/2023) for Symptoms failing to improve.    Jayda Doherty PA-C  [facility] [place] Paul is a 33 year old female who presents to clinic today for the following health issues:  Chief Complaint   Patient presents with    Abdominal Pain     Possible food poison, - abdomen pain, HA, vomiting x 130am last night -- could be from home made apple cider  -- whatever she  took she vomited     HPI    Patient is presenting to urgent care today with complaint of vomiting/nausea since early this morning.  Gastro    Onset of symptoms was  1 am this morning  Course of illness is same.    Severity moderate  Current and Associated symptoms:  vomiting, nausea, abdominal cramping, HA  Aggravating factors: eating.    Alleviating factors: nothing  Diarrhea: Yes  1 stools today, non bloody  Stools: loose  Vomitting: Yes   up to 12 times today, non bloody  Appetite: decreased  Risk factors: possible bad food exposure: Homemade apple cider  Denies: sick contacts, travel , recent antibiotic use, recent hospitalization, recent medication changes, hx of IBS, fever.  Treatment tried: rice, crackers.      Review of Systems  Constitutional, HEENT, cardiovascular, pulmonary, GI, , musculoskeletal, neuro, skin, endocrine and psych systems are negative, except as otherwise noted.      Objective    BP  128/82 (BP Location: Right arm, Patient Position: Chair, Cuff Size: Adult Regular)   Pulse 78   Temp 98.1  F (36.7  C) (Oral)   Resp 14   Wt 72.6 kg (160 lb)   LMP 11/06/2023 (Exact Date)   SpO2 98%   BMI 28.34 kg/m    Physical Exam   GENERAL: healthy, alert and no distress  RESP: lungs clear to auscultation - no rales, rhonchi or wheezes  CV: regular rate and rhythm, normal S1 S2  ABDOMEN: soft,  mild generalized TTP in all quadrants, no hepatosplenomegaly, no masses and bowel sounds normal  MS: no gross musculoskeletal defects noted, no edema

## 2023-12-01 ENCOUNTER — IMMUNIZATION (OUTPATIENT)
Dept: FAMILY MEDICINE | Facility: CLINIC | Age: 33
End: 2023-12-01
Payer: COMMERCIAL

## 2023-12-01 PROCEDURE — 91320 SARSCV2 VAC 30MCG TRS-SUC IM: CPT

## 2023-12-01 PROCEDURE — 90480 ADMN SARSCOV2 VAC 1/ONLY CMP: CPT

## 2023-12-01 PROCEDURE — 99207 PR NO CHARGE NURSE ONLY: CPT

## 2023-12-01 NOTE — PROGRESS NOTES
Prior to immunization administration, verified patients identity using patient s name and date of birth. Please see Immunization Activity for additional information.     Screening Questionnaire for Adult Immunization    Are you sick today?   No   Do you have allergies to medications, food, a vaccine component or latex?   No   Have you ever had a serious reaction after receiving a vaccination?   No   Do you have a long-term health problem with heart, lung, kidney, or metabolic disease (e.g., diabetes), asthma, a blood disorder, no spleen, complement component deficiency, a cochlear implant, or a spinal fluid leak?  Are you on long-term aspirin therapy?   No   Do you have cancer, leukemia, HIV/AIDS, or any other immune system problem?   No   Do you have a parent, brother, or sister with an immune system problem?   No   In the past 3 months, have you taken medications that affect  your immune system, such as prednisone, other steroids, or anticancer drugs; drugs for the treatment of rheumatoid arthritis, Crohn s disease, or psoriasis; or have you had radiation treatments?   No   Have you had a seizure, or a brain or other nervous system problem?   No   During the past year, have you received a transfusion of blood or blood    products, or been given immune (gamma) globulin or antiviral drug?   No   For women: Are you pregnant or is there a chance you could become       pregnant during the next month?   No   Have you received any vaccinations in the past 4 weeks?   No     Immunization questionnaire answers were all negative.    I have reviewed the following standing orders:   This patient is due and qualifies for the Covid-19 vaccine.     Click here for COVID-19 Standing Order    I have reviewed the vaccines inclusion and exclusion criteria; No concerns regarding eligibility.     Patient instructed to remain in clinic for 15 minutes afterwards, and to report any adverse reactions.     Screening performed by Isabel  MAIN [place] on 12/1/2023 at 2:08 PM.

## 2023-12-19 ASSESSMENT — ENCOUNTER SYMPTOMS
ARTHRALGIAS: 0
PARESTHESIAS: 0
DIARRHEA: 0
NERVOUS/ANXIOUS: 0
HEMATURIA: 0
WEAKNESS: 0
MYALGIAS: 0
HEADACHES: 1
NAUSEA: 0
ABDOMINAL PAIN: 0
CHILLS: 0
SHORTNESS OF BREATH: 0
FREQUENCY: 0
FEVER: 0
BREAST MASS: 0
JOINT SWELLING: 0
SORE THROAT: 0
HEARTBURN: 1
DIZZINESS: 0
EYE PAIN: 0
CONSTIPATION: 0
PALPITATIONS: 0
HEMATOCHEZIA: 0
DYSURIA: 0
COUGH: 0

## 2023-12-22 ENCOUNTER — OFFICE VISIT (OUTPATIENT)
Dept: PEDIATRICS | Facility: CLINIC | Age: 33
End: 2023-12-22
Payer: COMMERCIAL

## 2023-12-22 VITALS
WEIGHT: 160.2 LBS | BODY MASS INDEX: 28.39 KG/M2 | HEIGHT: 63 IN | TEMPERATURE: 98.8 F | RESPIRATION RATE: 16 BRPM | HEART RATE: 81 BPM | DIASTOLIC BLOOD PRESSURE: 79 MMHG | SYSTOLIC BLOOD PRESSURE: 119 MMHG | OXYGEN SATURATION: 98 %

## 2023-12-22 DIAGNOSIS — G43.E09 CHRONIC MIGRAINE WITH AURA WITHOUT STATUS MIGRAINOSUS, NOT INTRACTABLE: ICD-10-CM

## 2023-12-22 DIAGNOSIS — Z31.69 PRE-CONCEPTION COUNSELING: ICD-10-CM

## 2023-12-22 DIAGNOSIS — Z00.00 ROUTINE GENERAL MEDICAL EXAMINATION AT A HEALTH CARE FACILITY: Primary | ICD-10-CM

## 2023-12-22 DIAGNOSIS — R87.611 ATYPICAL SQUAMOUS CELLS CANNOT EXCLUDE HIGH GRADE SQUAMOUS INTRAEPITHELIAL LESION ON CYTOLOGIC SMEAR OF CERVIX (ASC-H): ICD-10-CM

## 2023-12-22 LAB
CHOLEST SERPL-MCNC: 166 MG/DL
FASTING STATUS PATIENT QL REPORTED: NORMAL
FASTING STATUS PATIENT QL REPORTED: NORMAL
GLUCOSE SERPL-MCNC: 94 MG/DL (ref 70–99)
HDLC SERPL-MCNC: 65 MG/DL
LDLC SERPL CALC-MCNC: 89 MG/DL
NONHDLC SERPL-MCNC: 101 MG/DL
TRIGL SERPL-MCNC: 61 MG/DL

## 2023-12-22 PROCEDURE — 99395 PREV VISIT EST AGE 18-39: CPT | Performed by: NURSE PRACTITIONER

## 2023-12-22 PROCEDURE — 80061 LIPID PANEL: CPT | Performed by: NURSE PRACTITIONER

## 2023-12-22 PROCEDURE — 36415 COLL VENOUS BLD VENIPUNCTURE: CPT | Performed by: NURSE PRACTITIONER

## 2023-12-22 PROCEDURE — 82947 ASSAY GLUCOSE BLOOD QUANT: CPT | Performed by: NURSE PRACTITIONER

## 2023-12-22 ASSESSMENT — ENCOUNTER SYMPTOMS
ARTHRALGIAS: 0
PALPITATIONS: 0
DIARRHEA: 0
DIZZINESS: 0
EYE PAIN: 0
HEMATURIA: 0
CHILLS: 0
PARESTHESIAS: 0
HEARTBURN: 1
MYALGIAS: 0
CONSTIPATION: 0
SORE THROAT: 0
HEADACHES: 1
JOINT SWELLING: 0
DYSURIA: 0
WEAKNESS: 0
FEVER: 0
FREQUENCY: 0
BREAST MASS: 0
NAUSEA: 0
HEMATOCHEZIA: 0
NERVOUS/ANXIOUS: 0
ABDOMINAL PAIN: 0
SHORTNESS OF BREATH: 0
COUGH: 0

## 2023-12-22 ASSESSMENT — PAIN SCALES - GENERAL: PAINLEVEL: NO PAIN (0)
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
## 2024-01-09 ENCOUNTER — MYC MEDICAL ADVICE (OUTPATIENT)
Dept: PEDIATRICS | Facility: CLINIC | Age: 34
End: 2024-01-09
Payer: COMMERCIAL

## 2024-01-10 NOTE — TELEPHONE ENCOUNTER
I reviewed the product in [facility] and it looks like some of the ingredients are listed as unsafe and I would not recommend use.  See below for the specifics on safety of each produce.  Hard with so many ingredients, may do better with individual ingredients not with a blend.    From [employer]  Below is general information about the safety of the known ingredients contained in the product Morning Complete Apple Cinnamon. Some ingredients may not be listed. This information does NOT represent a recommendation for or a test of this specific product as a whole.    +ALFALFA  POSSIBLY SAFE ...when the leaves are used orally and appropriately, short-term (4,6,12).  LIKELY UNSAFE ...when large amounts are used long-term.  PREGNANCY AND LACTATION: POSSIBLY UNSAFE  when used orally in medicinal amounts.  +ALOE (organic Aloe Vera)  LIKELY SAFE ...when aloe gel is used topically and appropriately.  POSSIBLY SAFE ...when aloe gel is used orally and appropriately, short-term.  POSSIBLY UNSAFE ...when aloe latex is used orally.  LIKELY UNSAFE ...when aloe latex is used orally in high doses.  CHILDREN: POSSIBLY SAFE  when aloe gel is used topically and appropriately.  CHILDREN: POSSIBLY UNSAFE  when aloe latex and aloe whole leaf extracts are used orally in children.  PREGNANCY: POSSIBLY UNSAFE  when used orally.  LACTATION: POSSIBLY UNSAFE  when aloe preparations are used orally.  +ASTRAGALUS (Astragalus root extract)  POSSIBLY SAFE ...when used orally and appropriately.  PREGNANCY AND LACTATION:  There is insufficient reliable information in humans.  +BACILLUS COAGULANS (B. coagulans)  POSSIBLY SAFE ...when taken orally and appropriately.  CHILDREN: POSSIBLY SAFE  when taken orally and appropriately.  PREGNANCY AND LACTATION:  Insufficient reliable information available; avoid using.  +BARLEY (Barley Grass)  LIKELY SAFE ...when used orally and appropriately in food amounts  (4819,4820,4821,5104,31694,12604,28350,21277,41159,03727).  PREGNANCY: LIKELY SAFE  when used orally in amounts commonly found in foods (19).  PREGNANCY: POSSIBLY UNSAFE  when barley sprouts are consumed in relatively high doses.  LACTATION:  Insufficient reliable information available; avoid using.  +BERBERINE (Berberine HCl)  POSSIBLY SAFE ...when used orally and appropriately.  CHILDREN: LIKELY UNSAFE  when used orally in newborns.  PREGNANCY: LIKELY UNSAFE  when used orally.  LACTATION: LIKELY UNSAFE  when used orally.  +BIFIDOBACTERIUM BIFIDUM (B. bifidum)  LIKELY SAFE ...when used orally and appropriately.  CHILDREN: LIKELY SAFE  when used orally and appropriately in children of most ages.  PREGNANCY: POSSIBLY SAFE  when Bifidobacterium bifidum is used orally and appropriately, short-term.  LACTATION:  There is insufficient reliable information available about the safety of Bifidobacterium bifidum during lactation.  +BIFIDOBACTERIUM LONGUM (B. longum)  LIKELY SAFE ...when used orally and appropriately.  CHILDREN: LIKELY SAFE  when used orally and appropriately in children of most ages.  PREGNANCY AND LACTATION: POSSIBLY SAFE  when used orally and appropriately, short-term.  +BITTER MELON (Bitter Melon Fruit Extract)  POSSIBLY SAFE ...when the fruit is used orally and appropriately, short-term.  PREGNANCY: POSSIBLY UNSAFE  when used orally.  LACTATION:  Insufficient reliable information available; avoid using.  +BLACK PEPPER  LIKELY SAFE ...when used orally in amounts commonly found in foods.  POSSIBLY SAFE ...when black pepper oil is applied topically.  CHILDREN: LIKELY SAFE  when used orally in amounts commonly found in foods (11).  CHILDREN: POSSIBLY UNSAFE  when used orally in large amounts.  PREGNANCY: LIKELY SAFE  when used orally in amounts commonly found in foods (11).  PREGNANCY: LIKELY UNSAFE  when used orally in large amounts.  LACTATION: LIKELY SAFE  when used orally in amounts commonly found in  foods (11).  +BROCCOLI  LIKELY SAFE ...when used orally in food amounts (03450).  PREGNANCY AND LACTATION: LIKELY SAFE  when used orally in food amounts (11863).  +CEYLON CINNAMON (Cinnamon)  LIKELY SAFE ...when consumed in amounts commonly found in foods.  POSSIBLY SAFE ...when used orally and appropriately in medicinal amounts.  PREGNANCY: LIKELY SAFE  when consumed in amounts commonly found in foods (4912).  PREGNANCY: LIKELY UNSAFE  when used orally in amounts greater than those found in foods.  LACTATION: LIKELY SAFE  when consumed in amounts commonly found in foods (4912).  +CHICORY  LIKELY SAFE ...when consumed in amounts commonly found in food.  POSSIBLY SAFE ...when used orally in medicinal amounts, short-term.  PREGNANCY: POSSIBLY UNSAFE  when used orally in excessive amounts.  LACTATION:  Insufficient reliable information available; avoid using.  +DIINDOLYLMETHANE (DIM)  LIKELY SAFE ...when used orally in amounts commonly found in foods.  POSSIBLY SAFE ...when used orally and appropriately in medicinal doses.  POSSIBLY UNSAFE ...when used orally in doses of 600 mg daily.  CHILDREN: LIKELY SAFE  when used orally in amounts commonly found in foods.  PREGNANCY AND LACTATION: LIKELY SAFE  when used orally in amounts commonly found in foods.  +FENNEL  LIKELY SAFE ...when used orally in amounts commonly found in foods.  POSSIBLY SAFE ...when fennel essential oil or extract is used orally and appropriately, short-term.  CHILDREN: POSSIBLY SAFE  when combination products containing fennel are used to treat colic in infants for up to one week.  PREGNANCY: POSSIBLY UNSAFE  when used orally.  LACTATION: POSSIBLY UNSAFE  when used orally.  +MICHAEL  LIKELY SAFE ...when used orally and appropriately.  POSSIBLY SAFE ...when used topically and appropriately, short-term (99759,77711).  CHILDREN: LIKELY SAFE  when consumed in the amounts typically found in foods.  CHILDREN: POSSIBLY SAFE  when used orally and  appropriately, short-term.  PREGNANCY: LIKELY SAFE  when consumed in the amounts typically found in foods.  PREGNANCY: POSSIBLY SAFE  when used for medicinal purposes.  LACTATION: LIKELY SAFE  when consumed in the amounts typically found in foods.  +GOJI (Lycium)  POSSIBLY SAFE ...when goji fruit preparations are used orally and appropriately, short-term.  PREGNANCY AND LACTATION:  Insufficient reliable information available.  +GREEN TEA (Green Tea leaf extract)  LIKELY SAFE ...when green tea is consumed as a beverage in moderate amounts (733,6031,9222,9223,9225,9226,9227,9228,18052,19783)(16876,57759,71405,57190,09010).  POSSIBLY SAFE ...when green tea extract is used orally.  POSSIBLY UNSAFE ...when consumed as a beverage in large quantities.  LIKELY UNSAFE ...when used orally in very high doses.  CHILDREN: POSSIBLY SAFE  when used orally by children and adolescents in amounts commonly found in foods and beverages (4912,84607).  PREGNANCY: POSSIBLY SAFE  when used orally in moderate amounts.  PREGNANCY: POSSIBLY UNSAFE  when used orally in amounts providing more than 300 mg caffeine daily.  LACTATION: POSSIBLY SAFE  when used orally in moderate amounts.  LACTATION: POSSIBLY UNSAFE  when used orally in large amounts.  +GYMNEMA (Gymnema natasha)  POSSIBLY SAFE ...when used orally and appropriately.  PREGNANCY AND LACTATION:  Insufficient reliable information available; avoid using.  +YOBANY WU (Polygonum cuspidatum root extract)  There is insufficient reliable information available about the safety of yobany wu.  PREGNANCY AND LACTATION:  Insufficient reliable information available; avoid using.  +KALE  LIKELY SAFE ...when used in amounts commonly found in foods.  PREGNANCY AND LACTATION: LIKELY SAFE  when used orally in amounts commonly found in foods.  +LACTICASEIBACILLUS CASEI (L. casei)  LIKELY SAFE ...when used orally and appropriately.  CHILDREN: LIKELY SAFE  when used orally and appropriately in children of  most ages.  PREGNANCY: POSSIBLY SAFE  when used orally and appropriately.  LACTATION:  There is insufficient reliable information available about the safety of Lacticaseibacillus casei during lactation.  +LACTOBACILLUS ACIDOPHILUS (L. gasseri, L. plantarum, L. rhamnosus, L. acidophilus)  LIKELY SAFE ...when used orally and appropriately.  CHILDREN: LIKELY SAFE  when used orally and appropriately in children of most ages.  PREGNANCY: POSSIBLY SAFE  when used orally and appropriately.  LACTATION:  There is insufficient reliable information available about the safety of Lactobacillus acidophilus during lactation.  +MILK THISTLE (Milk Thistle seed extract)  LIKELY SAFE ...when used orally and appropriately.  POSSIBLY SAFE ...when used topically and appropriately, short-term.  PREGNANCY AND LACTATION:  While research in an animal model shows that taking milk thistle during pregnancy and lactation does not adversely impact infant development (022591), there is insufficient reliable information available about its safety during pregnancy or lactation in humans; avoid using.  CHILDREN: POSSIBLY SAFE  when used orally and appropriately, short-term.  +MONTEFramed Data PINE (Pine bark extract)  POSSIBLY SAFE ...when used orally and appropriately, short-term.  PREGNANCY AND LACTATION:  Insufficient reliable information available; avoid using.  +POMEGRANATE (Pomegranate fruit extract)  LIKELY SAFE ...when pomegranate fruit or fruit juice is used orally and appropriately.  POSSIBLY SAFE ...when pomegranate extract is taken orally and appropriately.  POSSIBLY UNSAFE ...when the pomegranate root, stem, and peel are used orally in large amounts.  PREGNANCY AND LACTATION: POSSIBLY SAFE  when the fruit or fruit juice is consumed orally and appropriately (29775,883749).  +RHODIOLA (Rhodiola rosea root extract)  POSSIBLY SAFE ...when used orally and appropriately, short-term.  PREGNANCY AND LACTATION:  Insufficient reliable information  available; avoid using.  +SPINACH  LIKELY SAFE ...when used in amounts commonly found in foods.  POSSIBLY SAFE ...when used orally and appropriately in medicinal amounts.  CHILDREN: LIKELY SAFE  when consumed in the amounts commonly found in foods by children older than 4 months of age (18).  CHILDREN: LIKELY UNSAFE  when used orally in infants under 4 months old; the high nitrate content of spinach can cause methemoglobinemia (18).  PREGNANCY AND LACTATION: LIKELY SAFE  when used in amounts commonly found in foods; avoid medicinal amounts.  +TURMERIC (Turmeric root extract)  LIKELY SAFE ...when used orally and appropriately, short-term.  POSSIBLY SAFE ...when used as an enema, short-term.  PREGNANCY: LIKELY SAFE  when used orally in amounts commonly found in food.  PREGNANCY: LIKELY UNSAFE  when used orally in medicinal amounts; turmeric might stimulate the uterus and increase menstrual flow (12).  LACTATION: LIKELY SAFE  when used orally in amounts commonly found in food.

## 2024-01-12 NOTE — TELEPHONE ENCOUNTER
Kate,    Please see  message and advise regarding a probiotic    Thank you  Ronal Kimball RN on 1/12/2024 at 8:14 AM

## 2024-02-12 ENCOUNTER — VIRTUAL VISIT (OUTPATIENT)
Dept: OBGYN | Facility: CLINIC | Age: 34
End: 2024-02-12
Payer: COMMERCIAL

## 2024-02-12 DIAGNOSIS — Z34.01 ENCOUNTER FOR SUPERVISION OF NORMAL FIRST PREGNANCY IN FIRST TRIMESTER: Primary | ICD-10-CM

## 2024-02-12 PROCEDURE — 99207 PR NO CHARGE NURSE ONLY: CPT | Mod: 93

## 2024-02-12 RX ORDER — VITAMIN A ACETATE, .BETA.-CAROTENE, ASCORBIC ACID, CHOLECALCIFEROL, .ALPHA.-TOCOPHEROL ACETATE, DL-, THIAMINE MONONITRATE, RIBOFLAVIN, NIACINAMIDE, PYRIDOXINE HYDROCHLORIDE, FOLIC ACID, CYANOCOBALAMIN, CALCIUM CARBONATE, FERROUS FUMARATE, ZINC OXIDE, AND CUPRIC OXIDE 2000; 2000; 120; 400; 22; 1.84; 3; 20; 10; 1; 12; 200; 27; 25; 2 [IU]/1; [IU]/1; MG/1; [IU]/1; MG/1; MG/1; MG/1; MG/1; MG/1; MG/1; UG/1; MG/1; MG/1; MG/1; MG/1
1 TABLET ORAL DAILY
COMMUNITY

## 2024-02-12 NOTE — PROGRESS NOTES
NPN nurse visit done over the phone. Pt will be given NPN folder and book at her upcoming appt.   Discussed optional screening available to assess chromosomal anomalies. Questions answered. Pt advised to call the clinic if she has any questions or concerns related to her pregnancy. Prenatal labs will be obtained at her upcoming appt. New prenatal visit scheduled on 3/7 with Dr Muñoz.    6w1d      Last pap: 5/16/23        Patient supplied answers from flow sheet for:  Prenatal OB Questionnaire.  Past Medical History  Have you ever recieved care for your mental health? : (!) Yes  Have you ever been in a major accident or suffered serious trauma?: (!) Yes  Within the last year, has anyone hit, slapped, kicked or otherwise hurt you?: No  In the last year, has anyone forced you to have sex when you didn't want to?: No    Past Medical History 2   Have you ever received a blood transfusion?: No  Would you accept a blood transfusion if was medically recommended?: Yes  Does anyone in your home smoke?: No   Is your blood type Rh negative?: Unknown  Have you ever ?: No  Have you been hospitalized for a nonsurgical reason excluding normal delivery?: Unknown  Have you ever had an abnormal pap smear?: (!) Yes    Past Medical History (Continued)  Do you have a history of abnormalities of the uterus?: Unknown  Did your mother take ANGELY or any other hormones when she was pregnant with you?: Unknown  Do you have any other problems we have not asked about which you feel may be important to this pregnancy?: No    ANGELLA Alex

## 2024-02-13 ENCOUNTER — TELEPHONE (OUTPATIENT)
Dept: BEHAVIORAL HEALTH | Facility: CLINIC | Age: 34
End: 2024-02-13
Payer: COMMERCIAL

## 2024-02-13 NOTE — TELEPHONE ENCOUNTER
Patient had appointment with her OBGYN care team. [facility] services were requested. No immediate safety/risk issues were reported or identified. Explained the role of the [facility] and provided informational handout and contact information for the [facility].     Pt scheduled for 2/28 at 2pm.    Padmini [facility], Behavioral Health Clinician

## 2024-02-22 ASSESSMENT — PATIENT HEALTH QUESTIONNAIRE - PHQ9
SUM OF ALL RESPONSES TO PHQ QUESTIONS 1-9: 5
SUM OF ALL RESPONSES TO PHQ QUESTIONS 1-9: 5
10. IF YOU CHECKED OFF ANY PROBLEMS, HOW DIFFICULT HAVE THESE PROBLEMS MADE IT FOR YOU TO DO YOUR WORK, TAKE CARE OF THINGS AT HOME, OR GET ALONG WITH OTHER PEOPLE: SOMEWHAT DIFFICULT

## 2024-02-27 LAB
ABO/RH(D): NORMAL
ANTIBODY SCREEN: NEGATIVE
SPECIMEN EXPIRATION DATE: NORMAL

## 2024-02-28 ENCOUNTER — VIRTUAL VISIT (OUTPATIENT)
Dept: BEHAVIORAL HEALTH | Facility: CLINIC | Age: 34
End: 2024-02-28
Payer: COMMERCIAL

## 2024-02-28 ENCOUNTER — LAB (OUTPATIENT)
Dept: LAB | Facility: CLINIC | Age: 34
End: 2024-02-28
Payer: COMMERCIAL

## 2024-02-28 ENCOUNTER — ANCILLARY PROCEDURE (OUTPATIENT)
Dept: ULTRASOUND IMAGING | Facility: CLINIC | Age: 34
End: 2024-02-28
Payer: COMMERCIAL

## 2024-02-28 DIAGNOSIS — F33.0 MILD EPISODE OF RECURRENT MAJOR DEPRESSIVE DISORDER (H): Primary | ICD-10-CM

## 2024-02-28 DIAGNOSIS — Z34.01 ENCOUNTER FOR SUPERVISION OF NORMAL FIRST PREGNANCY IN FIRST TRIMESTER: ICD-10-CM

## 2024-02-28 DIAGNOSIS — F41.1 GAD (GENERALIZED ANXIETY DISORDER): ICD-10-CM

## 2024-02-28 LAB
ERYTHROCYTE [DISTWIDTH] IN BLOOD BY AUTOMATED COUNT: 12.5 % (ref 10–15)
HBV SURFACE AG SERPL QL IA: NONREACTIVE
HCT VFR BLD AUTO: 37 % (ref 35–47)
HCV AB SERPL QL IA: NONREACTIVE
HGB BLD-MCNC: 13 G/DL (ref 11.7–15.7)
MCH RBC QN AUTO: 31.9 PG (ref 26.5–33)
MCHC RBC AUTO-ENTMCNC: 35.1 G/DL (ref 31.5–36.5)
MCV RBC AUTO: 91 FL (ref 78–100)
PLATELET # BLD AUTO: 303 10E3/UL (ref 150–450)
RBC # BLD AUTO: 4.07 10E6/UL (ref 3.8–5.2)
T PALLIDUM AB SER QL: NONREACTIVE
WBC # BLD AUTO: 9.1 10E3/UL (ref 4–11)

## 2024-02-28 PROCEDURE — 86900 BLOOD TYPING SEROLOGIC ABO: CPT

## 2024-02-28 PROCEDURE — 87389 HIV-1 AG W/HIV-1&-2 AB AG IA: CPT

## 2024-02-28 PROCEDURE — 86901 BLOOD TYPING SEROLOGIC RH(D): CPT

## 2024-02-28 PROCEDURE — 36415 COLL VENOUS BLD VENIPUNCTURE: CPT

## 2024-02-28 PROCEDURE — 76817 TRANSVAGINAL US OBSTETRIC: CPT | Performed by: OBSTETRICS & GYNECOLOGY

## 2024-02-28 PROCEDURE — 87340 HEPATITIS B SURFACE AG IA: CPT

## 2024-02-28 PROCEDURE — 86803 HEPATITIS C AB TEST: CPT

## 2024-02-28 PROCEDURE — 86780 TREPONEMA PALLIDUM: CPT

## 2024-02-28 PROCEDURE — 86850 RBC ANTIBODY SCREEN: CPT

## 2024-02-28 PROCEDURE — 90791 PSYCH DIAGNOSTIC EVALUATION: CPT | Mod: 95 | Performed by: COUNSELOR

## 2024-02-28 PROCEDURE — 85027 COMPLETE CBC AUTOMATED: CPT

## 2024-02-28 PROCEDURE — 86762 RUBELLA ANTIBODY: CPT

## 2024-02-28 PROCEDURE — 76801 OB US < 14 WKS SINGLE FETUS: CPT | Performed by: OBSTETRICS & GYNECOLOGY

## 2024-02-28 PROCEDURE — 87086 URINE CULTURE/COLONY COUNT: CPT

## 2024-02-28 ASSESSMENT — COLUMBIA-SUICIDE SEVERITY RATING SCALE - C-SSRS
1. HAVE YOU WISHED YOU WERE DEAD OR WISHED YOU COULD GO TO SLEEP AND NOT WAKE UP?: NO
TOTAL  NUMBER OF ABORTED OR SELF INTERRUPTED ATTEMPTS LIFETIME: NO
6. HAVE YOU EVER DONE ANYTHING, STARTED TO DO ANYTHING, OR PREPARED TO DO ANYTHING TO END YOUR LIFE?: NO
2. HAVE YOU ACTUALLY HAD ANY THOUGHTS OF KILLING YOURSELF?: NO
TOTAL  NUMBER OF INTERRUPTED ATTEMPTS LIFETIME: NO
ATTEMPT LIFETIME: NO
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
## 2024-02-29 LAB
BACTERIA UR CULT: NORMAL
HIV 1+2 AB+HIV1 P24 AG SERPL QL IA: NONREACTIVE
RUBV IGG SERPL QL IA: 9.34 INDEX
RUBV IGG SERPL QL IA: POSITIVE

## 2024-03-07 ENCOUNTER — PRENATAL OFFICE VISIT (OUTPATIENT)
Dept: OBGYN | Facility: CLINIC | Age: 34
End: 2024-03-07
Payer: COMMERCIAL

## 2024-03-07 ENCOUNTER — TRANSCRIBE ORDERS (OUTPATIENT)
Dept: MATERNAL FETAL MEDICINE | Facility: CLINIC | Age: 34
End: 2024-03-07

## 2024-03-07 VITALS
BODY MASS INDEX: 30.91 KG/M2 | DIASTOLIC BLOOD PRESSURE: 68 MMHG | SYSTOLIC BLOOD PRESSURE: 124 MMHG | WEIGHT: 168 LBS | HEIGHT: 62 IN

## 2024-03-07 DIAGNOSIS — O21.9 NAUSEA AND VOMITING DURING PREGNANCY: Primary | ICD-10-CM

## 2024-03-07 DIAGNOSIS — O26.90 PREGNANCY RELATED CONDITION, ANTEPARTUM: Primary | ICD-10-CM

## 2024-03-07 DIAGNOSIS — Z34.01 ENCOUNTER FOR SUPERVISION OF NORMAL FIRST PREGNANCY IN FIRST TRIMESTER: ICD-10-CM

## 2024-03-07 PROCEDURE — 99213 OFFICE O/P EST LOW 20 MIN: CPT | Performed by: OBSTETRICS & GYNECOLOGY

## 2024-03-07 PROCEDURE — 87491 CHLMYD TRACH DNA AMP PROBE: CPT | Performed by: OBSTETRICS & GYNECOLOGY

## 2024-03-07 PROCEDURE — 88175 CYTOPATH C/V AUTO FLUID REDO: CPT | Performed by: OBSTETRICS & GYNECOLOGY

## 2024-03-07 PROCEDURE — 87624 HPV HI-RISK TYP POOLED RSLT: CPT | Performed by: OBSTETRICS & GYNECOLOGY

## 2024-03-07 PROCEDURE — 87591 N.GONORRHOEAE DNA AMP PROB: CPT | Performed by: OBSTETRICS & GYNECOLOGY

## 2024-03-07 RX ORDER — ONDANSETRON 4 MG/1
4 TABLET, FILM COATED ORAL EVERY 8 HOURS PRN
Qty: 20 TABLET | Refills: 0 | Status: SHIPPED | OUTPATIENT
Start: 2024-03-07 | End: 2024-04-03

## 2024-03-07 RX ORDER — ACETAMINOPHEN 325 MG/1
325-650 TABLET ORAL EVERY 6 HOURS PRN
COMMUNITY

## 2024-03-07 NOTE — NURSING NOTE
"Chief Complaint   Patient presents with    Prenatal Care     NPN provider visit   9w4d    initial /68   Ht 1.575 m (5' 2\")   Wt 76.2 kg (168 lb)   LMP 12/31/2023 (Exact Date)   BMI 30.73 kg/m   Estimated body mass index is 30.73 kg/m  as calculated from the following:    Height as of this encounter: 1.575 m (5' 2\").    Weight as of this encounter: 76.2 kg (168 lb).  BP completed using cuff size regular.  Cata Remy CMA    "
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
## 2024-03-08 LAB
C TRACH DNA SPEC QL NAA+PROBE: NEGATIVE
N GONORRHOEA DNA SPEC QL NAA+PROBE: NEGATIVE

## 2024-03-11 LAB
BKR LAB AP GYN ADEQUACY: NORMAL
BKR LAB AP GYN INTERPRETATION: NORMAL
BKR LAB AP HPV REFLEX: NORMAL
BKR LAB AP LMP: NORMAL
BKR LAB AP PREVIOUS ABNL DX: NORMAL
BKR LAB AP PREVIOUS ABNORMAL: NORMAL
PATH REPORT.COMMENTS IMP SPEC: NORMAL
PATH REPORT.COMMENTS IMP SPEC: NORMAL
PATH REPORT.RELEVANT HX SPEC: NORMAL

## 2024-03-13 ENCOUNTER — PATIENT OUTREACH (OUTPATIENT)
Dept: OBGYN | Facility: CLINIC | Age: 34
End: 2024-03-13
Payer: COMMERCIAL

## 2024-03-13 PROBLEM — D06.9 CIN III WITH SEVERE DYSPLASIA: Status: ACTIVE | Noted: 2022-08-01

## 2024-03-18 ENCOUNTER — PRE VISIT (OUTPATIENT)
Dept: MATERNAL FETAL MEDICINE | Facility: CLINIC | Age: 34
End: 2024-03-18
Payer: COMMERCIAL

## 2024-03-26 ENCOUNTER — OFFICE VISIT (OUTPATIENT)
Dept: MATERNAL FETAL MEDICINE | Facility: CLINIC | Age: 34
End: 2024-03-26
Attending: OBSTETRICS & GYNECOLOGY
Payer: COMMERCIAL

## 2024-03-26 ENCOUNTER — HOSPITAL ENCOUNTER (OUTPATIENT)
Dept: ULTRASOUND IMAGING | Facility: CLINIC | Age: 34
Discharge: HOME | End: 2024-03-26
Attending: OBSTETRICS & GYNECOLOGY
Payer: COMMERCIAL

## 2024-03-26 ENCOUNTER — MEDICAL CORRESPONDENCE (OUTPATIENT)
Dept: HEALTH INFORMATION MANAGEMENT | Facility: CLINIC | Age: 34
End: 2024-03-26

## 2024-03-26 ENCOUNTER — LAB (OUTPATIENT)
Dept: LAB | Facility: CLINIC | Age: 34
End: 2024-03-26
Attending: OBSTETRICS & GYNECOLOGY
Payer: COMMERCIAL

## 2024-03-26 DIAGNOSIS — Z98.890 HISTORY OF LOOP ELECTROSURGICAL EXCISION PROCEDURE (LEEP) OF CERVIX AFFECTING PREGNANCY IN FIRST TRIMESTER: ICD-10-CM

## 2024-03-26 DIAGNOSIS — Z31.5 ENCOUNTER FOR PROCREATIVE GENETIC COUNSELING AND TESTING: ICD-10-CM

## 2024-03-26 DIAGNOSIS — Z36.9 ENCOUNTER FOR ANTENATAL SCREENING OF MOTHER: Primary | ICD-10-CM

## 2024-03-26 DIAGNOSIS — O26.90 PREGNANCY RELATED CONDITION, ANTEPARTUM: ICD-10-CM

## 2024-03-26 DIAGNOSIS — Z36.9 ENCOUNTER FOR ANTENATAL SCREENING OF MOTHER: ICD-10-CM

## 2024-03-26 DIAGNOSIS — Z36.9 FIRST TRIMESTER SCREENING: Primary | ICD-10-CM

## 2024-03-26 DIAGNOSIS — O34.41 HISTORY OF LOOP ELECTROSURGICAL EXCISION PROCEDURE (LEEP) OF CERVIX AFFECTING PREGNANCY IN FIRST TRIMESTER: ICD-10-CM

## 2024-03-26 PROCEDURE — 96040 HC GENETIC COUNSELING, EACH 30 MINUTES: CPT

## 2024-03-26 PROCEDURE — 76813 OB US NUCHAL MEAS 1 GEST: CPT

## 2024-03-26 PROCEDURE — 76813 OB US NUCHAL MEAS 1 GEST: CPT | Mod: 26 | Performed by: OBSTETRICS & GYNECOLOGY

## 2024-03-26 PROCEDURE — 36415 COLL VENOUS BLD VENIPUNCTURE: CPT

## 2024-03-26 NOTE — PROGRESS NOTES
"Please see \"Imaging\" tab under \"Chart Review\" for details of today's US at the [facility].    Hilario Huitron MD  Maternal-Fetal Medicine    "

## 2024-03-26 NOTE — PROGRESS NOTES
[facility]  Genetic Counseling Consult    Patient:  Kate Gallo  Preferred Name: Beverly YOB: 1990   Date of Service:  3/26/24   MRN: [id number]    Beverly was seen at the [facility] for genetic consultation. The indication for genetic counseling is desire to discuss options for genetic screening and diagnostics. The patient was unaccompanied to this visit.     The session was conducted in English.      IMPRESSION/ PLAN   1. Kate has not had genetic screening in this pregnancy but elected to have screening today.     2. During today's [facility] visit, Kate had a blood draw for non-invasive prenatal testing (also called NIPT, NIPS, or cell-free DNA) through [facility] [employer] This NIPT screens for trisomy 21, 18, and 13 and the patient opted to screen for sex chromosome aneuploidies, including reported fetal sex. Results are expected in 7-10 days. The patient will be called with results and if they do not answer they requested a detailed message with results on their voicemail, including the predicted fetal sex information.  Patient was informed that results, including fetal sex, will be available in [employer].    3. Since the patient chose aneuploidy screening via NIPT, quad screen is NOT recommended in the second trimester. If the patient desires screening for open neural tube defects, maternal serum AFP only is recommended, ideally between 16-18 weeks gestation.    4. Kate had a nuchal translucency ultrasound today. Please see the ultrasound report for further details.    5. Further recommendations include a fetal anatomy level II ultrasound with [facility]. The upcoming ultrasound has been scheduled for 2024.    PREGNANCY HISTORY   /Parity:       Kate's pregnancy history is insignificant. This is her first pregnancy.    CURRENT PREGNANCY   Current Age: 33 year old   Age at Delivery: 34 year old  MORELIA:  "10/6/2024, by Last Menstrual Period                                   Gestational Age: 12w2d  This pregnancy is a single gestation.   Beverly denies bleeding, complications, illnesses, fevers, and exposure concerns. She reports that she is taking Reglan, Zofran, Tums, tylenol, boo, Flonase, and prenatal vitamins.    MEDICAL HISTORY   Kate s reported medical history is not expected to impact pregnancy management or risks to fetal development.       FAMILY HISTORY   A three-generation pedigree was obtained today and is scanned under the \"Media\" tab in Epic. The family history was reported by Kate.    The following significant findings were reported today:   Beverly has a healthy sister (27y) who has no children.  Beverly's mother (61y) is healthy. Beverly has eight healthy maternal aunts/uncles who have a history of arthritis and osteoporosis. Beverly's maternal grandparents are still living and are healthy.  Beverly's mother had three miscarriages. We reviewed that at least 10-15% of the recognized pregnancies end in miscarriage, with most losses occurring in the first trimester. The rate of miscarriage less than 8 weeks gestation may be even greater as some women may not recognize that they are pregnant. Around half of miscarriages that occur before 20 weeks gestation are caused by chromosome abnormalities.For couples who have experienced three or more unexplained pregnancy losses, it has been estimated that around 2-5% of these couples have this history related to a chromosome in one of the partners.   Beverly's father (61y) is healthy. Beverly has three paternal uncles. They have type II diabetes and substance use disorders. Beverly's paternal grandmother  from a brain tumor, but Many had limited information about it as she was little when it happened. Her paternal grandfather  in a car accident in his eighties. He had type II diabetes.  Juwan (34y), Beverly's partner, is healthy. He has depression. We discussed how " mental illnesses are thought to be inherited in a multifactorial fashion, meaning many factors are involved in the development of this condition. These factors usually include both genetic and environmental aspects and a combination of these aspects lead to the condition. Given that there is a genetic component, the couple's children may be at increased risk to develop a mental illness and were encouraged to share this information with their pediatrician and have awareness for early signs and symptoms.   Juwan's mother and father are in their late fifties and are healthy. Juwan has three maternal aunts. His maternal grandmother is still living. He has no information about his maternal grandfather.  Juwan's paternal grandparents are , his grandmother from leukemia and his grandfather from old age. Juwan has three paternal uncles. One has schizophrenia.    Otherwise, the reported family history is unremarkable for stillbirths, birth defects, intellectual disabilities, known genetic conditions, cancer <50y, and consanguinity.       RISK ASSESSMENT FOR INHERITED CONDITIONS AND CARRIER SCREENING OPTIONS   Expanded carrier screening is available to screen for autosomal recessive conditions and X-linked conditions in a large list of genes. Carrier screening does not test the pregnancy but gives a risk assessment for the pregnancy and future pregnancies to have the condition. Expanded carrier screening is designed to identify carrier status for conditions that are primarily childhood or adolescent onset. Expanded carrier screening does not evaluate for adult-onset conditions such as hereditary cancer syndromes, dementia/ Alzheimer's disease, or cardiovascular disease risk factors. Additionally, expanded carrier screening is not comprehensive for all known genetic diseases or inherited conditions. Carrier screening does not test for all genetic and health conditions or risk factors.     Autosomal recessive  conditions happen when a mutation has been inherited from the egg and sperm and include conditions like cystic fibrosis, thalassemia, hearing loss, spinal muscular atrophy, and more. We reviewed that when both biological parents carry a harmful genetic change in a gene associated with autosomal recessive inheritance, each of their pregnancies has a 1 in 4 (25%) chance to be affected by that condition. X-linked conditions happen when a mutation has been inherited from the egg and include conditions like fragile X syndrome.With x-linked conditions, the specific risk generally depends on the chromosomal sex of the fetus, with XY individuals (generally male) being most severely affected.      screening was reviewed. About [place]  Screening    The patient does NOT have a family history of known inherited conditions. This does NOT mean the patient and/or their partner is not a carrier of a condition. Approximately 90% of couples at an increased reproductive risk for an inherited condition have no family history of that condition.     The patient nor their partner have had carrier screening previously.     The patient was not certain about whether to pursue carrier screening today. They will contact us if they would like to pursue screening. See below for the more detailed information we discussed.An informational brochure and my contact card were provided.    Expanded carrier screening for mutations in a large panel of genes associated with autosomal recessive conditions including cystic fibrosis, spinal muscular atrophy, and others, is now available.  We discussed that expanded carrier screening is designed to identify carrier status for conditions that are primarily childhood or adolescent onset. Expanded carrier screening does not evaluate for adult-onset conditions such as hereditary cancer syndromes, dementia/ Alzheimer's disease, or cardiovascular disease risk factors. Additionally, expanded carrier  "screening is not comprehensive for all known genetic diseases or inherited conditions. This is a screening test, and residual carrier status risk figures will be provided to the patient after results become available. Carrier screening is not meant to diagnose the patient with a condition, and generally carriers are asymptomatic. However, certain genes may confer increased risks for various health concerns in carriers (DMD, FMR1, etc).  Patients are encouraged to share results with their primary care providers to ensure appropriate screening. If we are notified by the performing laboratory of a variant reclassification, the patient will be contacted.   We reviewed that there is a law in place, the Genetic Information Nondiscrimination Act (LIZZETH), that protects patients from discrimination by health insurance companies and employers based on their genetic information. [place] does not protect against discrimination by life insurance companies or disability insurance.  We reviewed availability of expanded carrier screening through [employer] and different panel sizes.  If an individual is a carrier, family members could be as well. The patient is encouraged to share positive results with siblings and other family members of reproductive age. Additionally, even if there is not a high reproductive risk for a condition, it is possible that carrier status can be passed on to future generations.  [facility] is not responsible for this billing, it is handled entirely by the performing lab. The patient has the responsibility of continuing with insurance billing or the option of changing to self pay. Many plans \"cover\" genetic testing but that does not mean free. Covered benefits go towards a deductible or out of pocket maximum (if a plan has these). If the patient decides the better financial option is to do self pay instead, it is their responsibility to communicate this to the performing lab. Genetic counselors " have limited availability to change or help with this process. [email or web address] billing can also be reached at  [phone] or [email or web address]    RISK ASSESSMENT FOR CHROMOSOME CONDITIONS   We explained that the risk for fetal chromosome abnormalities increases with maternal age. We discussed specific features of common chromosome abnormalities, including trisomy 21 (Down syndrome), trisomy 13, trisomy 18, and sex chromosome trisomies.    At age 34 at midtrimester, the risk to have a baby with Down syndrome is 1 in 508.   At age 34 at midtrimester, the risk to have a baby with any chromosome abnormality is 1 in 254.     Kate has not had genetic screening in this pregnancy but elected to have screening today.      GENETIC TESTING OPTIONS   Genetic testing during a pregnancy includes screening and diagnostic procedures.      Screening tests are non-invasive which means no risk to the pregnancy and includes ultrasounds and blood work. The benefits and limitations of screening were reviewed. Screening tests provide a risk assessment (chance) specific to the pregnancy for certain fetal chromosome abnormalities but cannot definitively diagnose or exclude a fetal chromosome abnormality. Follow-up genetic counseling and consideration of diagnostic testing is recommended with any abnormal screening result. Diagnostic testing during a pregnancy is more certain and can test for more conditions. However, the tests do have a risk of miscarriage that requires careful consideration. These tests can detect fetal chromosome abnormalities with greater than 99% certainty. Results can be compromised by maternal cell contamination or mosaicism and are limited by the resolution of current genetic testing technology.     There is no screening or diagnostic test that detects all forms of birth defects or intellectual disability.     We discussed the following screening options:     Non-invasive prenatal testing (NIPT)  Also called  cell-free DNA screening because it detects chromosomes from the placenta in the pregnant person's blood  Can be done any time after 10 weeks gestation  Standard recommendation for NIPT screens for trisomy 21, trisomy 18, trisomy 13, with the option of adding sex chromosome aneuploidies, without or without predicted sex  Cannot screen for open neural tube defects, maternal serum AFP after 15 weeks is recommended  New NIPT options include screening for other trisomies, microdeletion syndromes, and in some cases fetal blood antigens. Guidelines do not recommend these conditions are included in standard screening. These options have limitations and should be discussed with a genetic counselor.   However, current (2023) ACMG guidelines do recommend that screening for one microdeletion syndrome, called 22q11.2 deletion syndrome be offered to all pregnant patients. 22q11.2 deletion syndrome has an estimated prevalence of 1 in 990 to 1 in 2148 (0.05-0.1%). Risk is not thought to increase with maternal age. Clinical features are variable but include congenital heart defects, cleft palate, developmental delays, immune system deficiencies, and hearing loss. Approximately 90% of cases are de kailyn (a sporadic new change in a pregnancy). Cell-free DNA screening for 22q11.2 deletion syndrome is available with the inclusion of other microdeletion syndromes. There is less data about the performance of cell-free DNA screening for more rare microdeletions and the chance for false positives or negative may be increased.  We discussed the limitations of cell-free DNA screening in detecting microdeletions and the possiblity of false positives and false negatives. The patient declined microdeletion syndrome screening.    We discussed the following ultrasound options:    Nuchal translucency (NT) ultrasound  Ultrasound between [id number] that includes nuchal translucency measurement and nasal bone assessments  Nuchal translucency refers to  the space at the back of the neck where fluid builds up. All babies at this stage have fluid and there is only concern if there is too much fluid  Nasal bone refers to the small bone in the nose. There is concern for conditions like Down syndrome if the bone cannot be seen at all  This ultrasound can be done as part of first trimester screening, at the same time as another screen (NIPT), at the same time as a CVS, or if the patients does not want genetic screening.  Markers on ultrasound detects about 70% of pregnancies with aneuploidy  Abnormalities on NT ultrasound can also increase the risk for a birth defect, like a heart defect    Comprehensive level II ultrasound (Fetal Anatomy Ultrasound)  Ultrasound done between 18-20 weeks gestation  Screens for major birth defects and markers for aneuploidy (like trisomy 21 and trisomy 18)  Includes looking at the fetus/baby's growth, heart, organs (stomach, kidneys), placenta, and amniotic fluid    We discussed the following diagnostic options:     Chorionic villus sampling (CVS)  Invasive diagnostic procedure done between 10w0d and 13w6d  The procedure collects a small sample from the placenta for the purpose of chromosomal testing and/or other genetic testing  Diagnostic result; more than 99% sensitivity for fetal chromosome abnormalities  Cannot screen for open neural tube defects, maternal serum AFP after 15 weeks is recommended    Amniocentesis  Invasive diagnostic procedure done after 15 weeks gestation  The procedure collects a small sample of amniotic fluid for the purpose of chromosomal testing and/or other genetic testing  Diagnostic result; more than 99% sensitivity for fetal chromosome abnormalities  Testing for AFP in the amniotic fluid can test for open neural tube defects    It was a pleasure to be involved with Kate s care. Face-to-face time of the meeting was  40  minutes.    Kallie Munoz, GC, MS, [facility]  Board Certified and [place] Licensed Genetic  Counselor  [facility] [place]  Maternal Fetal Medicine  Office: [phone]  [facility]: [phone]   Fax: [phone]  [facility]

## 2024-03-27 ENCOUNTER — VIRTUAL VISIT (OUTPATIENT)
Dept: BEHAVIORAL HEALTH | Facility: CLINIC | Age: 34
End: 2024-03-27
Payer: COMMERCIAL

## 2024-03-27 DIAGNOSIS — F33.0 MILD EPISODE OF RECURRENT MAJOR DEPRESSIVE DISORDER (H): Primary | ICD-10-CM

## 2024-03-27 DIAGNOSIS — F41.1 GAD (GENERALIZED ANXIETY DISORDER): ICD-10-CM

## 2024-03-27 PROCEDURE — 90834 PSYTX W PT 45 MINUTES: CPT | Mod: 95 | Performed by: COUNSELOR

## 2024-03-27 NOTE — PROGRESS NOTES
[facility] - Integrated Behavioral Health  March 27, 2024      Behavioral Health Clinician Progress Note    Patient Name: Kate Gallo           Service Type:  Individual      Service Location:   [facility] / Email (patient reached)     Session Start Time: 3pm Session End Time: 3:45pm      Session Length: 38 - 52      Attendees: Patient     Service Modality:  Video Visit:      Provider verified identity through the following two step process.  Patient provided:  Patient is known previously to provider    Telemedicine Visit: The patient's condition can be safely assessed and treated via synchronous audio and visual telemedicine encounter.      Reason for Telemedicine Visit: Patient has requested telehealth visit    Originating Site (Patient Location): Patient's home    Distant Site (Provider Location): [facility]    Consent:  The patient/guardian has verbally consented to: the potential risks and benefits of telemedicine (video visit) versus in person care; bill my insurance or make self-payment for services provided; and responsibility for payment of non-covered services.     Patient would like the video invitation sent by:  My Chart    Mode of Communication:  Video Conference via [facility]    Distant Location (Provider):  On-site    As the provider I attest to compliance with applicable laws and regulations related to telemedicine.    Visit Activities (Refresh list every visit): [facility] Only    Diagnostic Assessment Date: 2/28/2024  Treatment Plan Date: March 27, 2024    Assessments:  The following assessments were completed by patient for this visit:    Previous PHQ-9:       2/22/2024    10:50 AM   PHQ-9 SCORE   PHQ-9 Total Score MyChart 5 (Mild depression)   PHQ-9 Total Score 5     Previous SHAHEED-7:       11/28/2022    10:19 AM   SHAHEED-7 SCORE   Total Score 4 (minimal anxiety)   Total Score 4     DATA  Extended Session (60+ minutes): No  Interactive  Complexity: No  Crisis: No  [facility] Patient: No    Treatment Objective(s) Addressed in This Session:  Target Behavior(s):  Symptoms management    Adjustment Difficulties: will develop coping/problem-solving skills to facilitate more adaptive adjustment    Current Stressors / Issues:  Pt shared she continues to be nauseous. She is on a medication regimen that is helping her to not be vomiting as much. This has be difficult for her mental health. She has been having anxiety due to not being able to eat and how is it impacting baby. Work has been difficult feeling so sick. She is feeling exhausted right now.     She is trying to take care of herself by sleeping when she needs to, eat when she can, find more movement, hygiene activities, and engaging in chores. She feels like her  is doing more of the chores and work around the home. She is also feeling like her coworkers are taking on more of the work because she is not able to do some of the duties easily. Discussed mindfulness and mantras as a practice to help her with the feelings of frustration that she is struggling with the amount of work and activity she was able to do prior to pregnancy.    She was just in [place] for her brother-in-laws wedding for 5 days. It was a nice reset and refresh. Flying was very uncomfortable due to motion sickness and the michael on the plane.     Progress on Treatment Objective(s) / Homework:  New Objective established this session - ACTION (Actively working towards change); Intervened by reinforcing change plan / affirming steps taken    Motivational Interviewing    MI Intervention: Co-Developed Goal: build coping skills, Expressed Empathy/Understanding, Supported Autonomy, Collaboration, Evocation, Permission to raise concern or advise, Open-ended questions, Reflections: simple and complex, and Reframe     Change Talk Expressed by the Patient: Reasons to change Need to change Committment to change Activation    Provider Response to  Change Talk: E - Evoked more info from patient about behavior change, A - Affirmed patient's thoughts, decisions, or attempts at behavior change, R - Reflected patient's change talk, and S - Summarized patient's change talk statements  CBT:  Discussed common cognitive distortions identified them in patient's life, Explored ways to challenge, replace, and act against these cognitions  SOLUTION FOCUSED: Explored patterns in patient's relationships and discussed options for new behaviors, Explored patterns in patient's actions and choices and discussed options for new behaviors  MINDFULLNESS-BASED-STRATEGIES:  Discussed skills based on development and application of mindfulness, Skills drawn from dialectical behavior therapy, mindfulness-based stress reduction, mindfulness-based cognitive therapy, etc.    Assessments completed prior to visit:  The following assessments were completed by patient for this visit:  PHQ9:       2/22/2024    10:50 AM   PHQ-9 SCORE   PHQ-9 Total Score MyChart 5 (Mild depression)   PHQ-9 Total Score 5     GAD7:       11/28/2022    10:19 AM   SHAHEED-7 SCORE   Total Score 4 (minimal anxiety)   Total Score 4     CAGE-AID:       2/22/2024    11:11 AM   CAGE-AID Total Score   Total Score 0   Total Score MyChart 0 (A total score of 2 or greater is considered clinically significant)     PROMIS 10-Global Health (only subscores and total score):       2/22/2024    11:10 AM   PROMIS-10 Scores Only   Global Mental Health Score 12   Global Physical Health Score 12   PROMIS TOTAL - SUBSCORES 24       Care Plan review completed: Yes    Medication Review:  No current psychiatric medications prescribed    Medication Compliance:  NA    Changes in Health Issues:   Yes: significant morning sickness in pregnancy, Associated Psychological Distress    Chemical Use Review:   Substance Use: Chemical use reviewed, no active concerns identified      Tobacco Use: No current tobacco use.      Assessment: Current Emotional /  Mental Status (status of significant symptoms):  Risk status (Self / Other harm or suicidal ideation)  Patient denies a history of suicidal ideation, suicide attempts, self-injurious behavior, homicidal ideation, homicidal behavior, and and other safety concerns  Patient denies current fears or concerns for personal safety.  Patient denies current or recent suicidal ideation or behaviors.  Patient denies current or recent homicidal ideation or behaviors.  Patient denies current or recent self injurious behavior or ideation.  Patient denies other safety concerns.  A safety and risk management plan has not been developed at this time, however patient was encouraged to call [place] [phone] should there be a change in any of these risk factors.  [place] Suicide Severity Rating Scale (Lifetime/Recent)      2/28/2024     2:00 PM   [place] Suicide Severity Rating (Lifetime/Recent)   Q1 Wish to be Dead (Lifetime) N   Q2 Non-Specific Active Suicidal Thoughts (Lifetime) N   Actual Attempt (Lifetime) N   Has subject engaged in non-suicidal self-injurious behavior? (Lifetime) N   Interrupted Attempts (Lifetime) N   Aborted or Self-Interrupted Attempt (Lifetime) N   Preparatory Acts or Behavior (Lifetime) N   Calculated C-SSRS Risk Score (Lifetime/Recent) No Risk Indicated         Appearance:   Appropriate   Eye Contact:   Good   Psychomotor Behavior: Normal   Attitude:   Cooperative   Orientation:   All  Speech   Rate / Production: Normal    Volume:  Normal   Mood:    Anxious  Depressed   Affect:    Appropriate   Thought Content:  Clear   Thought Form:  Coherent  Logical   Insight:    Good     Diagnoses:  1. Mild episode of recurrent major depressive disorder (H24)    2. SHAHEED (generalized anxiety disorder)        Collateral Reports Completed:  Not Applicable    Plan: (Homework, other):  Patient was given information about behavioral services and encouraged to schedule a follow up appointment with the clinic [facility] in 1 month.  [facility] provided information about mental health symptoms and treatment options  and information on mindfulness and exercises to practice.  She was also given CD Recommendations: No indications of CD issues.      JEANNIE Grullon, [facility]  Behavioral Health Clinician  [facility]      ______________________________________________________________________    Integrated Primary Care Behavioral Health Treatment Plan    Patient's Name: Kate Gallo  YOB: 1990    Date of Creation: March 27, 2024  Date Treatment Plan Last Reviewed/Revised: N/A    DSM5 Diagnoses: 296.31 (F33.0) Major Depressive Disorder, Recurrent Episode, Mild _ or 300.02 (F41.1) Generalized Anxiety Disorder  Psychosocial / Contextual Factors: Pt is  and currently pregnant with first child. She is employed fulltime.   PROMIS (reviewed every 90 days):   PROMIS 10-Global Health (only subscores and total score):       2/22/2024    11:10 AM   PROMIS-10 Scores Only   Global Mental Health Score 12   Global Physical Health Score 12   PROMIS TOTAL - SUBSCORES 24        Referral / Collaboration:  Referral to another professional/service is not indicated at this time..    Anticipated number of session for this episode of care: 8+  Anticipation frequency of session: Monthly  Anticipated Duration of each session: 38-52 minutes  Treatment plan will be reviewed in 90 days or when goals have been changed.       MeasurableTreatment Goal(s) related to diagnosis / functional impairment(s)  Goal 1: Patient will experience a reduction in depressive symptoms along with a corresponding increase in positive emotion and life satisfaction.    I will know I've met my goal when I am able to reduce negative self talk and feel engaged in my activities.      Objective #A (Patient Action)    Patient will Increase interest, engagement, and pleasure in doing things.  Status: New - Date: 3/27/2024    Intervention(s)  Therapist will help patient  "identify pleasant and mastery oriented events that elicit positive, relaxed mood.    Objective #B  Patient will Decrease frequency and intensity of feeling down, depressed, hopeless.  Status: New - Date: 3/27/2024    Intervention(s)  Therapist will introduce patient to cognitive-behavioral and acceptance and commitment therapy topics aimed to help reduce depression and anxiety    Objective #C  Patient will Identify negative self-talk and behaviors: challenge core beliefs, myths, and actions  Improve concentration, focus, and mindfulness in daily activities .  Status: New - Date: 3/27/2024     Intervention(s)  Therapist will help patient identify and manage negative self-talk and automatic thoughts; introduce patient to cognitive distortions; help patient develop cognitive diffusion techniques      Goal 2: Patient will experience a reduction in anxious symptoms, along with a corresponding increase in relaxed emotional states and life satisfaction.    I will know I've met my goal when I am able to effectively use new coping strategies.      Objective #A (Patient Action)  Patient will use cognitive-behavioral and thought diffusion strategies identified in therapy to challenge anxious thoughts.    Status: New - Date: 3/27/2024    Intervention(s)  Therapist will utilize CBT and ACT ideas to help patient challenge anxious thoughts and reduce intensity/duration of anxious distress    Objective #B  Patient will use \"worry time\" each day for 15 minutes of scheduled worry and then defer obsessive or anxious thinking until the next structured worry time.    Status: New - Date: 3/27/2024    Intervention(s)  Therapist will teach patient how to effectively utilize worry time and/or thought logs/journals each day and incorporate more relaxation behaviors into their routine.    Objective #C  Patient will identify the stressors which contribute to feelings of anxiety  Patient will increase engagement in adaptive coping skills and " recreational activities, such as exercise and healthy socialization, to manage distress.  Status: Status: New - Date: 3/27/2024    Intervention(s)  Therapist will help patient identify triggers/situational factors that contribute to anxiety and behavioral skills aimed to manage anxious distress.    Other Possible Therapeutic Intervention(s):    Psycho-education regarding mental health diagnoses and treatment options    Supportive Therapy  Provide affirmations, reflections, and establish working rapport  Emphasize and reflect on strength of therapeutic relationship    Skills training  Explore skills useful to client in current situation.  Skills include assertiveness, communication, conflict management, problem-solving, relaxation, etc.    Solution-Focused Therapy  Explore patterns in patient's relationships and discuss options for new behaviors.  Explore patterns in patient's actions and choices and discuss options for new behaviors.    Cognitive-behavioral Therapy  Discuss common cognitive distortions, identify them in patient's life.  Explore ways to challenge, replace, and act against these cognitions.    Acceptance and Commitment Therapy  Explore and identify important values in patient's life.  Discuss ways to commit to behavioral activation around these values.    Psychodynamic psychotherapy  Discuss patient's emotional dynamics and issues and how they impact behaviors.  Explore patient's history of relationships and how they impact present behaviors.  Explore how to work with and make changes in these schemas and patterns.    Narrative Therapy  Explore the patient's story of his/her life from his/her perspective.  Explore alternate ways of understanding their experience, identifying exceptions, developing new themes.    Interpersonal Psychotherapy  Explore patterns in relationships that are effective or ineffective at helping patient reach their goals, find satisfying experience.  Discuss new patterns or  behaviors to engage in for improved social functioning.    Behavioral Activation  Discuss steps patient can take to become more involved in meaningful activity.  Identify barriers to these activities and explore possible solutions.    Mindfulness-Based Strategies  Discuss skills based on development and application of mindfulness.  Skills drawn from compassion-focused therapy, dialectical behavior therapy, mindfulness-based stress reduction, mindfulness-based cognitive therapy, etc.      Patient has reviewed and agreed to the above plan.      Padmini Bowen, [facility]  March 27, 2024

## 2024-04-03 ENCOUNTER — TELEPHONE (OUTPATIENT)
Dept: MATERNAL FETAL MEDICINE | Facility: CLINIC | Age: 34
End: 2024-04-03

## 2024-04-03 ENCOUNTER — PRENATAL OFFICE VISIT (OUTPATIENT)
Dept: OBGYN | Facility: CLINIC | Age: 34
End: 2024-04-03
Payer: COMMERCIAL

## 2024-04-03 VITALS — BODY MASS INDEX: 30.54 KG/M2 | SYSTOLIC BLOOD PRESSURE: 116 MMHG | DIASTOLIC BLOOD PRESSURE: 70 MMHG | WEIGHT: 167 LBS

## 2024-04-03 DIAGNOSIS — O21.9 NAUSEA AND VOMITING DURING PREGNANCY: ICD-10-CM

## 2024-04-03 DIAGNOSIS — Z34.02 ENCOUNTER FOR SUPERVISION OF NORMAL FIRST PREGNANCY IN SECOND TRIMESTER: Primary | ICD-10-CM

## 2024-04-03 LAB — SCANNED LAB RESULT: NORMAL

## 2024-04-03 PROCEDURE — 99207 PR PRENATAL VISIT: CPT | Performed by: OBSTETRICS & GYNECOLOGY

## 2024-04-03 RX ORDER — ONDANSETRON 4 MG/1
8 TABLET, FILM COATED ORAL EVERY 8 HOURS PRN
Qty: 20 TABLET | Refills: 2 | Status: SHIPPED | OUTPATIENT
Start: 2024-04-03 | End: 2024-05-01

## 2024-04-03 NOTE — PROGRESS NOTES
32yo  at 13w3d here for scheduled visit.  Continues to experience daily nausea.  On Zofran.  Side effects from Reglan and compazine earlier.  Refill Zofran at 8mg dose.  NIPS result todat normal 46XX.  RTC 4 weeks

## 2024-04-03 NOTE — TELEPHONE ENCOUNTER
April 3, 2024    I left a message for Kate regarding her NIPT results.     Results indicate NO ANEUPLOIDY DETECTED for chromosomes 21, 18, 13, or the sex chromosomes (XX). The patient did not elect to proceed with screening for microdeletions (1p36, 22q11.2, 5p, 4p, and 15q11.2)    This puts her current pregnancy at low risk for Down syndrome, trisomy 18, trisomy 13 and sex chromosome abnormalities. This test is reported to have the following sensitivities: Down syndrome- 99.7%, trisomy 18- 97.9%, and trisomy 13- 99.0%. Although these results are reassuring, this does not replace a standard chromosome analysis from a chorionic villus sampling or amniocentesis    MSAFP is the appropriate second trimester screening test for open neural tube defects; the maternal quad screen is not recommended.    Her results are available in her Epic chart for her primary OB to review.    Beverly returned my call and thanked me for the good news. She stated that she also wished to share that she did not want to pursue carrier screening. She is aware this option remains available, if desired.    Kallie Munoz MS, [facility]  Licensed Genetic Counselor  [facility]  Pager: [phone]  Office: [phone]

## 2024-04-22 ENCOUNTER — MYC MEDICAL ADVICE (OUTPATIENT)
Dept: BEHAVIORAL HEALTH | Facility: CLINIC | Age: 34
End: 2024-04-22

## 2024-05-01 ENCOUNTER — MYC REFILL (OUTPATIENT)
Dept: OBGYN | Facility: CLINIC | Age: 34
End: 2024-05-01
Payer: COMMERCIAL

## 2024-05-01 DIAGNOSIS — O21.9 NAUSEA AND VOMITING DURING PREGNANCY: ICD-10-CM

## 2024-05-01 RX ORDER — ONDANSETRON 4 MG/1
8 TABLET, FILM COATED ORAL EVERY 8 HOURS PRN
Qty: 20 TABLET | Refills: 2 | Status: SHIPPED | OUTPATIENT
Start: 2024-05-01 | End: 2024-05-31

## 2024-05-03 ENCOUNTER — PRENATAL OFFICE VISIT (OUTPATIENT)
Dept: OBGYN | Facility: CLINIC | Age: 34
End: 2024-05-03
Payer: COMMERCIAL

## 2024-05-03 VITALS — WEIGHT: 165 LBS | SYSTOLIC BLOOD PRESSURE: 106 MMHG | DIASTOLIC BLOOD PRESSURE: 60 MMHG | BODY MASS INDEX: 30.18 KG/M2

## 2024-05-03 DIAGNOSIS — Z98.890 HISTORY OF LOOP ELECTROSURGICAL EXCISION PROCEDURE (LEEP) OF CERVIX AFFECTING PREGNANCY IN SECOND TRIMESTER: ICD-10-CM

## 2024-05-03 DIAGNOSIS — O34.42 HISTORY OF LOOP ELECTROSURGICAL EXCISION PROCEDURE (LEEP) OF CERVIX AFFECTING PREGNANCY IN SECOND TRIMESTER: ICD-10-CM

## 2024-05-03 DIAGNOSIS — Z34.02 ENCOUNTER FOR SUPERVISION OF NORMAL FIRST PREGNANCY IN SECOND TRIMESTER: Primary | ICD-10-CM

## 2024-05-03 PROCEDURE — 99207 PR PRENATAL VISIT: CPT | Performed by: OBSTETRICS & GYNECOLOGY

## 2024-05-03 RX ORDER — FAMOTIDINE 20 MG/1
TABLET, FILM COATED ORAL
COMMUNITY
Start: 2024-04-22

## 2024-05-03 NOTE — PROGRESS NOTES
35yo  at 17w5d.  Still requiring Zofran for N/V.  Advised attempting to wean off periodically.  Beginning to perceive FM but not very distinct yet.  Has L2 U/S .  RTC 4 weeks

## 2024-05-07 ENCOUNTER — HOSPITAL ENCOUNTER (OUTPATIENT)
Dept: ULTRASOUND IMAGING | Facility: CLINIC | Age: 34
Discharge: HOME | End: 2024-05-07
Attending: OBSTETRICS & GYNECOLOGY
Payer: COMMERCIAL

## 2024-05-07 ENCOUNTER — OFFICE VISIT (OUTPATIENT)
Dept: MATERNAL FETAL MEDICINE | Facility: CLINIC | Age: 34
End: 2024-05-07
Attending: OBSTETRICS & GYNECOLOGY
Payer: COMMERCIAL

## 2024-05-07 DIAGNOSIS — Z98.890 HISTORY OF LOOP ELECTROSURGICAL EXCISION PROCEDURE (LEEP) OF CERVIX AFFECTING PREGNANCY IN SECOND TRIMESTER: Primary | ICD-10-CM

## 2024-05-07 DIAGNOSIS — O34.42 HISTORY OF LOOP ELECTROSURGICAL EXCISION PROCEDURE (LEEP) OF CERVIX AFFECTING PREGNANCY IN SECOND TRIMESTER: Primary | ICD-10-CM

## 2024-05-07 DIAGNOSIS — Z98.890 HISTORY OF LOOP ELECTROSURGICAL EXCISION PROCEDURE (LEEP) OF CERVIX AFFECTING PREGNANCY IN FIRST TRIMESTER: ICD-10-CM

## 2024-05-07 DIAGNOSIS — O44.42 LOW LYING PLACENTA NOS OR WITHOUT HEMORRHAGE, SECOND TRIMESTER: ICD-10-CM

## 2024-05-07 DIAGNOSIS — Z36.2 ENCOUNTER FOR FOLLOW-UP ULTRASOUND OF FETAL ANATOMY: ICD-10-CM

## 2024-05-07 DIAGNOSIS — O34.41 HISTORY OF LOOP ELECTROSURGICAL EXCISION PROCEDURE (LEEP) OF CERVIX AFFECTING PREGNANCY IN FIRST TRIMESTER: ICD-10-CM

## 2024-05-07 PROCEDURE — 76811 OB US DETAILED SNGL FETUS: CPT

## 2024-05-07 PROCEDURE — 76811 OB US DETAILED SNGL FETUS: CPT | Mod: 26 | Performed by: OBSTETRICS & GYNECOLOGY

## 2024-05-07 PROCEDURE — 76817 TRANSVAGINAL US OBSTETRIC: CPT | Mod: 26 | Performed by: OBSTETRICS & GYNECOLOGY

## 2024-05-07 PROCEDURE — 76817 TRANSVAGINAL US OBSTETRIC: CPT

## 2024-05-07 NOTE — NURSING NOTE
Patient presents to [facility] for L2/TV at 18w2d due to hx of LEEP. Denies LOF, vaginal bleeding or cramping/contractions. SBAR given to [facility], see their note in Epic.

## 2024-05-07 NOTE — PROGRESS NOTES
"Please see \"Imaging\" tab under \"Chart Review\" for details of today's US at the [facility].    Hilario Huitron MD  Maternal-Fetal Medicine    "

## 2024-05-13 SDOH — HEALTH STABILITY: PHYSICAL HEALTH: ON AVERAGE, HOW MANY DAYS PER WEEK DO YOU ENGAGE IN MODERATE TO STRENUOUS EXERCISE (LIKE A BRISK WALK)?: 2 DAYS

## 2024-05-13 SDOH — HEALTH STABILITY: PHYSICAL HEALTH: ON AVERAGE, HOW MANY MINUTES DO YOU ENGAGE IN EXERCISE AT THIS LEVEL?: 20 MIN

## 2024-05-13 ASSESSMENT — SOCIAL DETERMINANTS OF HEALTH (SDOH): HOW OFTEN DO YOU GET TOGETHER WITH FRIENDS OR RELATIVES?: ONCE A WEEK

## 2024-05-20 ENCOUNTER — OFFICE VISIT (OUTPATIENT)
Dept: PEDIATRICS | Facility: CLINIC | Age: 34
End: 2024-05-20
Payer: COMMERCIAL

## 2024-05-20 VITALS
DIASTOLIC BLOOD PRESSURE: 66 MMHG | HEART RATE: 88 BPM | HEIGHT: 62 IN | OXYGEN SATURATION: 98 % | SYSTOLIC BLOOD PRESSURE: 122 MMHG | TEMPERATURE: 97.9 F | BODY MASS INDEX: 30.97 KG/M2 | RESPIRATION RATE: 16 BRPM | WEIGHT: 168.3 LBS

## 2024-05-20 DIAGNOSIS — L30.9 DERMATITIS: ICD-10-CM

## 2024-05-20 DIAGNOSIS — Z00.00 ENCOUNTER FOR PREVENTATIVE ADULT HEALTH CARE EXAMINATION: Primary | ICD-10-CM

## 2024-05-20 DIAGNOSIS — L91.8 SKIN TAG: ICD-10-CM

## 2024-05-20 PROCEDURE — 99213 OFFICE O/P EST LOW 20 MIN: CPT | Performed by: NURSE PRACTITIONER

## 2024-05-20 PROCEDURE — 99207 E-CONSULT TO DERMATOLOGY (ADULT OUTPT PROVIDER TO SPECIALIST WRITTEN QUESTION & RESPONSE): CPT | Performed by: NURSE PRACTITIONER

## 2024-05-20 PROCEDURE — 99395 PREV VISIT EST AGE 18-39: CPT | Performed by: NURSE PRACTITIONER

## 2024-05-20 ASSESSMENT — PAIN SCALES - GENERAL: PAINLEVEL: NO PAIN (0)

## 2024-05-20 NOTE — PROGRESS NOTES
Preventive Care Visit  [facility] YANET Parekh CNP, Family Medicine  May 20, 2024      Assessment & Plan     Encounter for preventative adult health care examination  Age appropriate screening and preventative care have been addressed today. Vaccinations have been reviewed. Patient has been advised to follow a balanced diet. They have been advised to undertake routine aerobic activity. Recommend annual vision exams as well as biannual dental exams. They will follow up for annual physical again in one year.     Skin tag  To left breast/areola area. Continue to monitor for now. Will follow-up if growing or changing.     Dermatitis  Using oil emollients without much improvement. Has stopped using all other topical irritants including make-up.   - Adult E-Consult to Dermatology (Outpt Provider to Specialist Written Question & Response)            Counseling  Appropriate preventive services were discussed with this patient, including applicable screening as appropriate for fall prevention, nutrition, physical activity, Tobacco-use cessation, weight loss and cognition.  Checklist reviewing preventive services available has been given to the patient.      Marco Paul is a 34 year old, presenting for the following:  Physical        5/20/2024     9:06 AM   Additional Questions   Roomed by sergey   Accompanied by na         5/20/2024     9:06 AM   Patient Reported Additional Medications   Patient reports taking the following new medications na        Health Care Directive  Patient does not have a Health Care Directive or Living Will: Discussed advance care planning with patient; information given to patient to review.    Skin irritation on upper lip. Flaky skin. No itching, just discomfort. Vaseline and aquaphor. Stopped using lip gloss, lipstick. Aquaphor seemed to irritate it more than vaseline. Vaseline helps temporarily.     Some new skin tags on her breasts, larger one near left  areola. Not bothersome, just concerned if it will impact breastfeeding.          5/13/2024   General Health   How would you rate your overall physical health? Good   Feel stress (tense, anxious, or unable to sleep) To some extent   (!) STRESS CONCERN      5/13/2024   Nutrition   Three or more servings of calcium each day? Yes   Diet: Regular (no restrictions)   How many servings of fruit and vegetables per day? (!) 2-3   How many sweetened beverages each day? 0-1         5/13/2024   Exercise   Days per week of moderate/strenous exercise 2 days   Average minutes spent exercising at this level 20 min   (!) EXERCISE CONCERN      5/13/2024   Social Factors   Frequency of gathering with friends or relatives Once a week   Worry food won't last until get money to buy more No   Food not last or not have enough money for food? No   Do you have housing?  Yes   Are you worried about losing your housing? No   Lack of transportation? No   Unable to get utilities (heat,electricity)? No         5/13/2024   Dental   Dentist two times every year? Yes         5/13/2024   TB Screening   Were you born outside of the [place]? No         Today's PHQ-9 Score:       2/22/2024    10:50 AM   PHQ-9 SCORE   PHQ-9 Total Score MyChart 5 (Mild depression)   PHQ-9 Total Score 5           5/13/2024   Substance Use   Alcohol more than 3/day or more than 7/wk Not Applicable   Do you use any other substances recreationally? No     Social History     Tobacco Use    Smoking status: Never    Smokeless tobacco: Never   Vaping Use    Vaping status: Never Used   Substance Use Topics    Alcohol use: Not Currently     Comment: Alcoholic Drinks/day: about 3 drinks a week, socially    Drug use: Never          Mammogram Screening - Patient under 40 years of age: Routine Mammogram Screening not recommended.         5/13/2024   STI Screening   New sexual partner(s) since last STI/HIV test? No     History of abnormal Pap smear: YES - reflected in Problem List and Health  "Maintenance accordingly        Latest Ref Rng & Units 3/7/2024    10:55 AM 5/16/2023     3:04 PM 7/25/2022     9:54 AM   PAP / HPV   PAP  Negative for Intraepithelial Lesion or Malignancy (NILM)  Negative for Intraepithelial Lesion or Malignancy (NILM)  Atypical squamous cells of undetermined significance, cannot exclude high-grade squamous intraepithelial lesion (ASC-H)    HPV 16 DNA Negative Negative  Negative  Negative    HPV 18 DNA Negative Negative  Negative  Negative    Other HR HPV Negative Negative  Negative  Positive            5/13/2024   Contraception/Family Planning   Questions about contraception or family planning No        Reviewed and updated as needed this visit by Provider                    Patient Active Problem List   Diagnosis    Chronic neck pain    KAELA III with severe dysplasia     Past Surgical History:   Procedure Laterality Date    LEEP TX, CERVICAL      TONSILLECTOMY & ADENOIDECTOMY         Social History     Tobacco Use    Smoking status: Never    Smokeless tobacco: Never   Substance Use Topics    Alcohol use: Not Currently     Comment: Alcoholic Drinks/day: about 3 drinks a week, socially     Family History   Problem Relation Age of Onset    No Known Problems Mother     Hyperlipidemia Father     Rheumatoid Arthritis Maternal Grandmother     Diabetes Paternal Grandfather     Alcoholism Paternal Uncle     Alcoholism Paternal Uncle     Substance Abuse Paternal Uncle     Breast Cancer No family hx of     Colon Cancer No family hx of               Objective    Exam  /66   Pulse 88   Temp 97.9  F (36.6  C) (Temporal)   Resp 16   Ht 1.575 m (5' 2\")   Wt 76.3 kg (168 lb 4.8 oz)   LMP 12/31/2023 (Exact Date)   SpO2 98%   BMI 30.78 kg/m     Estimated body mass index is 30.78 kg/m  as calculated from the following:    Height as of this encounter: 1.575 m (5' 2\").    Weight as of this encounter: 76.3 kg (168 lb 4.8 oz).    Physical Exam  Constitutional: appears to be in no acute " distress, comfortable, pleasant.   Eyes: anicteric, conjunctiva clear without drainage or erythema. JULIET.   Ears, Nose and Throat: tympanic membranes gray with LR,  nose without nasal discharge. OP: no erythema to posterior pharynx, negative post nasal drainage, tonsils +1 no erythema or exudate.  Neck: supple, thyroid palpable,not enlarged, no nodules   Breast: Small skin tag to left areola, about a cm from nipple, 1 o'clock position.   Cardiovascular: regular rate and rhythm, normal S1 and S2, no murmurs, rubs or gallops, peripheral pulses full and symmetric; negative peripheral edema   Respiratory: Air entry throughout. Breathing pattern unlabored without the use of accessory muscles. Clear to auscultation A and P, no wheezes or crackles, normal breath sounds.    Gastrointestinal: rounded, soft. Positive bowel sounds x4, nontender, no masses.   Genitourinary: Exam deferred (deferred after discussion of exam options with patient, no symptoms or concerns, pap up to date).   Musculoskeletal: full range of motion, no edema.   Skin: pink, turgor smooth and elastic. Negative for lesions or dryness.  Neurological: normal gait, no tremor.   Psychological: appropriate mood and affect.   Lymphatic: no cervical, axillary, supraclavicular, or infraclavicular lymphadenopathy.        Signed Electronically by: YANET Salter CNP

## 2024-05-22 ENCOUNTER — MYC MEDICAL ADVICE (OUTPATIENT)
Dept: PEDIATRICS | Facility: CLINIC | Age: 34
End: 2024-05-22
Payer: COMMERCIAL

## 2024-05-28 ENCOUNTER — PRENATAL OFFICE VISIT (OUTPATIENT)
Dept: OBGYN | Facility: CLINIC | Age: 34
End: 2024-05-28
Payer: COMMERCIAL

## 2024-05-28 ENCOUNTER — E-CONSULT (OUTPATIENT)
Dept: DERMATOLOGY | Facility: CLINIC | Age: 34
End: 2024-05-28
Payer: COMMERCIAL

## 2024-05-28 VITALS
HEIGHT: 62 IN | BODY MASS INDEX: 30.79 KG/M2 | DIASTOLIC BLOOD PRESSURE: 60 MMHG | WEIGHT: 167.3 LBS | SYSTOLIC BLOOD PRESSURE: 112 MMHG

## 2024-05-28 DIAGNOSIS — Z34.02 ENCOUNTER FOR SUPERVISION OF NORMAL FIRST PREGNANCY IN SECOND TRIMESTER: Primary | ICD-10-CM

## 2024-05-28 DIAGNOSIS — Z98.890 HISTORY OF LOOP ELECTROSURGICAL EXCISION PROCEDURE (LEEP) OF CERVIX AFFECTING PREGNANCY IN SECOND TRIMESTER: ICD-10-CM

## 2024-05-28 DIAGNOSIS — O34.42 HISTORY OF LOOP ELECTROSURGICAL EXCISION PROCEDURE (LEEP) OF CERVIX AFFECTING PREGNANCY IN SECOND TRIMESTER: ICD-10-CM

## 2024-05-28 PROCEDURE — 99451 NTRPROF PH1/NTRNET/EHR 5/>: CPT | Performed by: DERMATOLOGY

## 2024-05-28 PROCEDURE — 99207 PR PRENATAL VISIT: CPT | Performed by: OBSTETRICS & GYNECOLOGY

## 2024-05-28 NOTE — NURSING NOTE
"21w2d    Chief Complaint   Patient presents with    Prenatal Care     Has a hard time taking prenatal vitamins--2 weeks ago felt sharp pain in middle of belly straight when sitting up--now sore--swelling in feet, ankles and calves--nipples started leaking clear fluid       Initial /60   Ht 1.575 m (5' 2\")   Wt 75.9 kg (167 lb 4.8 oz)   LMP 2023 (Exact Date)   BMI 30.60 kg/m   Estimated body mass index is 30.6 kg/m  as calculated from the following:    Height as of this encounter: 1.575 m (5' 2\").    Weight as of this encounter: 75.9 kg (167 lb 4.8 oz).  BP completed using cuff size: regular    Questioned patient about current smoking habits.  Pt. has never smoked.          The following HM Due: NONE      "

## 2024-05-29 NOTE — PROGRESS NOTES
5/28/2024     E-Consult has been accepted.    Interprofessional consultation requested by:  Kate Decker APRN CNP      Clinical Question/Purpose: MY CLINICAL QUESTION IS: Beverly is pregnant and has been dealing with dry flaky and irritated skin to her upper lip. Eliminated all lip gloss/lipstick, now just using Aquaphor and Vaseline. Aquaphor seems to make it worse, Vaseline helps temporarily. Wondering if you would treat this as a perioral dermatitis or something else. Topicals would need to be safe for use during pregnancy. See photos from [employer] encounter 05/22/24.     Patient assessment and information reviewed: notes and photos    Recommendations:   The distribution is somewhat atypical for periorificial dermatitis; typically this is confined to non-vermilion surfaces inferolateral to facial orifices. The predominant areas of the eruption appear to be on the upper cutaneous lip. It appears more consistent with an irritant or allergic contact dermatitis. Recommend application of hydrocortisone 2.5% ointment BID; in this quantity, it should not cause an issue with pregnancy.       The recommendations provided in this E-Consult are based on a review of clinical data pertinent to the clinical question presented, without a review of the patient's complete medical record or, the benefit of a comprehensive in-person or virtual patient evaluation. This consultation should not replace the clinical judgement and evaluation of the provider ordering this E-Consult. Any new clinical issues, or changes in patient status since the filing of this E-Consult will need to be taken into account when assessing these recommendations. Please contact me if you have further questions.    My total time spent reviewing clinical information and formulating assessment was 5 minutes.    Víctor Patterson MD, FAAD   of Dermatology  Department of Dermatology  [facility]

## 2024-05-30 DIAGNOSIS — L30.9 DERMATITIS: Primary | ICD-10-CM

## 2024-05-30 RX ORDER — HYDROCORTISONE 2.5 %
CREAM (GRAM) TOPICAL 2 TIMES DAILY
Qty: 30 G | Refills: 0 | Status: SHIPPED | OUTPATIENT
Start: 2024-05-30

## 2024-05-31 ENCOUNTER — MYC REFILL (OUTPATIENT)
Dept: OBGYN | Facility: CLINIC | Age: 34
End: 2024-05-31

## 2024-05-31 ENCOUNTER — OFFICE VISIT (OUTPATIENT)
Dept: MATERNAL FETAL MEDICINE | Facility: CLINIC | Age: 34
End: 2024-05-31
Attending: OBSTETRICS & GYNECOLOGY
Payer: COMMERCIAL

## 2024-05-31 ENCOUNTER — HOSPITAL ENCOUNTER (OUTPATIENT)
Dept: ULTRASOUND IMAGING | Facility: CLINIC | Age: 34
Discharge: HOME | End: 2024-05-31
Attending: OBSTETRICS & GYNECOLOGY
Payer: COMMERCIAL

## 2024-05-31 DIAGNOSIS — K21.9 GASTROESOPHAGEAL REFLUX DISEASE, UNSPECIFIED WHETHER ESOPHAGITIS PRESENT: Primary | ICD-10-CM

## 2024-05-31 DIAGNOSIS — Z36.2 ENCOUNTER FOR FOLLOW-UP ULTRASOUND OF FETAL ANATOMY: ICD-10-CM

## 2024-05-31 DIAGNOSIS — O26.90 PREGNANCY RELATED CONDITION, ANTEPARTUM: ICD-10-CM

## 2024-05-31 DIAGNOSIS — O44.42 LOW LYING PLACENTA NOS OR WITHOUT HEMORRHAGE, SECOND TRIMESTER: ICD-10-CM

## 2024-05-31 DIAGNOSIS — O44.40 LOW-LYING PLACENTA: Primary | ICD-10-CM

## 2024-05-31 DIAGNOSIS — O21.9 NAUSEA AND VOMITING DURING PREGNANCY: ICD-10-CM

## 2024-05-31 PROCEDURE — 76816 OB US FOLLOW-UP PER FETUS: CPT | Mod: 26 | Performed by: OBSTETRICS & GYNECOLOGY

## 2024-05-31 PROCEDURE — 99213 OFFICE O/P EST LOW 20 MIN: CPT | Mod: 25 | Performed by: OBSTETRICS & GYNECOLOGY

## 2024-05-31 PROCEDURE — 76816 OB US FOLLOW-UP PER FETUS: CPT

## 2024-05-31 RX ORDER — METOCLOPRAMIDE 10 MG/1
10 TABLET ORAL
Qty: 30 TABLET | Refills: 1 | Status: SHIPPED | OUTPATIENT
Start: 2024-05-31 | End: 2024-06-25

## 2024-05-31 RX ORDER — ONDANSETRON 4 MG/1
8 TABLET, FILM COATED ORAL EVERY 8 HOURS PRN
Qty: 20 TABLET | Refills: 2 | Status: SHIPPED | OUTPATIENT
Start: 2024-05-31 | End: 2024-06-25

## 2024-05-31 NOTE — PROGRESS NOTES
The patient was seen for an ultrasound in the [facility] at the [facility] today.  For a detailed report of the ultrasound examination, please see the ultrasound report which can be found under the imaging tab.    If you have questions regarding today's evaluation or if we can be of further service, please contact the Maternal[facility]Fetal Medicine Center.    Sulema Brush MD  , OB/GYN  Maternal-Fetal Medicine  [phone] (Pager)

## 2024-06-10 ENCOUNTER — MYC MEDICAL ADVICE (OUTPATIENT)
Dept: OBGYN | Facility: CLINIC | Age: 34
End: 2024-06-10
Payer: COMMERCIAL

## 2024-06-11 DIAGNOSIS — M54.50 ACUTE LOW BACK PAIN, UNSPECIFIED BACK PAIN LATERALITY, UNSPECIFIED WHETHER SCIATICA PRESENT: Primary | ICD-10-CM

## 2024-06-25 ENCOUNTER — PRENATAL OFFICE VISIT (OUTPATIENT)
Dept: OBGYN | Facility: CLINIC | Age: 34
End: 2024-06-25
Payer: COMMERCIAL

## 2024-06-25 VITALS — BODY MASS INDEX: 31.46 KG/M2 | SYSTOLIC BLOOD PRESSURE: 110 MMHG | WEIGHT: 172 LBS | DIASTOLIC BLOOD PRESSURE: 60 MMHG

## 2024-06-25 DIAGNOSIS — Z34.02 ENCOUNTER FOR SUPERVISION OF NORMAL FIRST PREGNANCY IN SECOND TRIMESTER: Primary | ICD-10-CM

## 2024-06-25 DIAGNOSIS — Z98.890 HISTORY OF LOOP ELECTROSURGICAL EXCISION PROCEDURE (LEEP) OF CERVIX AFFECTING PREGNANCY IN SECOND TRIMESTER: ICD-10-CM

## 2024-06-25 DIAGNOSIS — O34.42 HISTORY OF LOOP ELECTROSURGICAL EXCISION PROCEDURE (LEEP) OF CERVIX AFFECTING PREGNANCY IN SECOND TRIMESTER: ICD-10-CM

## 2024-06-25 DIAGNOSIS — O21.9 NAUSEA AND VOMITING DURING PREGNANCY: ICD-10-CM

## 2024-06-25 PROCEDURE — 99207 PR PRENATAL VISIT: CPT | Performed by: OBSTETRICS & GYNECOLOGY

## 2024-06-25 RX ORDER — ONDANSETRON 4 MG/1
8 TABLET, FILM COATED ORAL EVERY 8 HOURS PRN
Qty: 20 TABLET | Refills: 2 | Status: ON HOLD | OUTPATIENT
Start: 2024-06-25 | End: 2024-10-05

## 2024-06-25 NOTE — PROGRESS NOTES
35yo  at 25w2d here for routine visit.  PT consult pending.  GCT next.  Baby very active.  RTC 3 weeks

## 2024-07-08 ENCOUNTER — ALLIED HEALTH/NURSE VISIT (OUTPATIENT)
Dept: OBGYN | Facility: CLINIC | Age: 34
End: 2024-07-08
Payer: COMMERCIAL

## 2024-07-08 VITALS — WEIGHT: 175.5 LBS | SYSTOLIC BLOOD PRESSURE: 118 MMHG | DIASTOLIC BLOOD PRESSURE: 68 MMHG | BODY MASS INDEX: 32.1 KG/M2

## 2024-07-08 DIAGNOSIS — O12.00 EDEMA IN PREGNANCY, ANTEPARTUM: ICD-10-CM

## 2024-07-08 LAB
ALBUMIN MFR UR ELPH: 44.2 MG/DL
ALT SERPL W P-5'-P-CCNC: 19 U/L (ref 0–50)
AST SERPL W P-5'-P-CCNC: 14 U/L (ref 0–45)
BASOPHILS # BLD AUTO: 0 10E3/UL (ref 0–0.2)
BASOPHILS NFR BLD AUTO: 0 %
BUN SERPL-MCNC: 5.7 MG/DL (ref 6–20)
CREAT SERPL-MCNC: 0.52 MG/DL (ref 0.51–0.95)
CREAT UR-MCNC: 293.5 MG/DL
EGFRCR SERPLBLD CKD-EPI 2021: >90 ML/MIN/1.73M2
EOSINOPHIL # BLD AUTO: 0.1 10E3/UL (ref 0–0.7)
EOSINOPHIL NFR BLD AUTO: 1 %
ERYTHROCYTE [DISTWIDTH] IN BLOOD BY AUTOMATED COUNT: 13 % (ref 10–15)
HCT VFR BLD AUTO: 33 % (ref 35–47)
HGB BLD-MCNC: 11.4 G/DL (ref 11.7–15.7)
IMM GRANULOCYTES # BLD: 0.4 10E3/UL
IMM GRANULOCYTES NFR BLD: 3 %
LYMPHOCYTES # BLD AUTO: 1.9 10E3/UL (ref 0.8–5.3)
LYMPHOCYTES NFR BLD AUTO: 15 %
MCH RBC QN AUTO: 31.8 PG (ref 26.5–33)
MCHC RBC AUTO-ENTMCNC: 34.5 G/DL (ref 31.5–36.5)
MCV RBC AUTO: 92 FL (ref 78–100)
MONOCYTES # BLD AUTO: 0.7 10E3/UL (ref 0–1.3)
MONOCYTES NFR BLD AUTO: 6 %
NEUTROPHILS # BLD AUTO: 9.5 10E3/UL (ref 1.6–8.3)
NEUTROPHILS NFR BLD AUTO: 75 %
PLATELET # BLD AUTO: 288 10E3/UL (ref 150–450)
PROT/CREAT 24H UR: 0.15 MG/MG CR (ref 0–0.2)
RBC # BLD AUTO: 3.58 10E6/UL (ref 3.8–5.2)
URATE SERPL-MCNC: 2.3 MG/DL (ref 2.4–5.7)
WBC # BLD AUTO: 12.6 10E3/UL (ref 4–11)

## 2024-07-08 PROCEDURE — 36415 COLL VENOUS BLD VENIPUNCTURE: CPT

## 2024-07-08 PROCEDURE — 99207 PR NO CHARGE NURSE ONLY: CPT

## 2024-07-08 PROCEDURE — 84156 ASSAY OF PROTEIN URINE: CPT

## 2024-07-08 PROCEDURE — 85025 COMPLETE CBC W/AUTO DIFF WBC: CPT

## 2024-07-08 PROCEDURE — 82565 ASSAY OF CREATININE: CPT

## 2024-07-08 PROCEDURE — 84460 ALANINE AMINO (ALT) (SGPT): CPT

## 2024-07-08 PROCEDURE — 84520 ASSAY OF UREA NITROGEN: CPT

## 2024-07-08 PROCEDURE — 84550 ASSAY OF BLOOD/URIC ACID: CPT

## 2024-07-08 PROCEDURE — 84450 TRANSFERASE (AST) (SGOT): CPT

## 2024-07-08 NOTE — PROGRESS NOTES
Nurse Blood pressure visit.    See [employer] message 24        27w1d    C/o increased bilateral hand and bilateral lower extremity swelling.     States hands are sore and feels like it is difficult to move hands. 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   Denies tingling or numbness in hands.    +2 edema is hands and +3 pitting edema in lower extremities and ankles.    Reflexes +2 bilaterally.      Blood pressures   122/68     118/68    Sent to lab for blood work and urine.     Sarai FERNANDEZ RN

## 2024-07-10 ENCOUNTER — THERAPY VISIT (OUTPATIENT)
Dept: PHYSICAL THERAPY | Facility: CLINIC | Age: 34
End: 2024-07-10
Payer: COMMERCIAL

## 2024-07-10 DIAGNOSIS — M54.50 ACUTE LOW BACK PAIN, UNSPECIFIED BACK PAIN LATERALITY, UNSPECIFIED WHETHER SCIATICA PRESENT: ICD-10-CM

## 2024-07-10 PROCEDURE — 97161 PT EVAL LOW COMPLEX 20 MIN: CPT | Mod: GP | Performed by: PHYSICAL THERAPIST

## 2024-07-10 PROCEDURE — 97530 THERAPEUTIC ACTIVITIES: CPT | Mod: GP | Performed by: PHYSICAL THERAPIST

## 2024-07-10 PROCEDURE — 97110 THERAPEUTIC EXERCISES: CPT | Mod: GP | Performed by: PHYSICAL THERAPIST

## 2024-07-10 NOTE — PROGRESS NOTES
PHYSICAL THERAPY EVALUATION  Type of Visit: Evaluation       Fall Risk Screen:  Fall screen completed by: PT  Have you fallen 2 or more times in the past year?: No  Have you fallen and had an injury in the past year?: No  Is patient a fall risk?: No    Subjective       Pt currently pregnant with her first baby, 27 weeks. Pt experiencing low back and hip pain, reporting postural changes of having an arched back and walking differently. Increased pain with sitting and walking, unable to comfortably stand on one leg, don pants, or go up and down stairs.   Prior to pregnancy pt with active lifestyle, walking with dog, yoga and piliates, intramural sports. Now pt with more limited activity (also has HG), walking the dog and light yoga. For work pt's time is mixed between sitting and standing.   Presenting condition or subjective complaint: Pregnancy has affected my hips and lower back to the point where my quality of life is affected. I am in constant pain and while I wouldn t describe it as severe pain, it makes normal movements more challenging.  Date of onset: 06/11/24 (MD order)    Relevant medical history: Depression; Neck injury; Vision problems   Dates & types of surgery: 01/2016 tonsillectomy, 10/2022 LEEP    Prior diagnostic imaging/testing results:       Prior therapy history for the same diagnosis, illness or injury: No      Living Environment  Social support: With a significant other or spouse   Type of home: House   Stairs to enter the home: Yes 2 Is there a railing: Yes     Ramp: No   Stairs inside the home: Yes 21 Is there a railing: Yes     Help at home: None  Equipment owned:       Employment: Yes [employer]   Hobbies/Interests: Drawing, walking, pool, board games, gardening    Patient goals for therapy: Walk, stand, get dressed, etc. without sharp shooting pain durning normal movements.    Pain assessment: Pain present     Objective   LUMBAR SPINE EVALUATION  POSTURE:  anterior pelvic  tilt  GAIT:   Weightbearing Status: WBAT  Assistive Device(s): None  Gait Deviations: Antalgic  Base of support increased  BALANCE/PROPRIOCEPTION:  pain with SLS on R leg   PELVIC/SI SCREEN: Brandie (March): +  STRENGTH:  difficulty activating abdominals   PALPATION:  some discomfort at SIJ B R>L       Assessment & Plan   CLINICAL IMPRESSIONS  Medical Diagnosis: back and hip pain in pregnancy    Treatment Diagnosis: SIJ pain R>L   Impression/Assessment: Patient is a 34 year old female with low back and sacral complaints.  The following significant findings have been identified: Pain, Decreased strength, Impaired muscle performance, and Decreased activity tolerance. These impairments interfere with their ability to perform self care tasks, work tasks, recreational activities, household mobility, and community mobility as compared to previous level of function.     Clinical Decision Making (Complexity):  Clinical Presentation: Stable/Uncomplicated  Clinical Presentation Rationale: based on medical and personal factors listed in PT evaluation  Clinical Decision Making (Complexity): Low complexity    PLAN OF CARE  Treatment Interventions:  Interventions: Manual Therapy, Neuromuscular Re-education, Therapeutic Activity, Therapeutic Exercise, Self-Care/Home Management    Long Term Goals     PT Goal 1  Goal Identifier: stairs  Goal Description: pt will be able to go up and down stairs iwthout increase in pain  Rationale: to maximize safety and independence with performance of ADLs and functional tasks  Target Date: 09/04/24  Date Met: 09/04/24      Frequency of Treatment: once per week  Duration of Treatment: 8 weeks    Recommended Referrals to Other Professionals:  NA  Education Assessment:   Learner/Method: Patient  Education Comments: Pt educated on PT role and plan of care    Risks and benefits of evaluation/treatment have been explained.   Patient/Family/caregiver agrees with Plan of Care.     Evaluation Time:     PT  Christian, Low Complexity Minutes (95438): 20     Signing Clinician: Roxana Pittman PT

## 2024-07-15 ENCOUNTER — OFFICE VISIT (OUTPATIENT)
Dept: MATERNAL FETAL MEDICINE | Facility: CLINIC | Age: 34
End: 2024-07-15
Attending: OBSTETRICS & GYNECOLOGY
Payer: COMMERCIAL

## 2024-07-15 ENCOUNTER — HOSPITAL ENCOUNTER (OUTPATIENT)
Dept: ULTRASOUND IMAGING | Facility: CLINIC | Age: 34
Discharge: HOME | End: 2024-07-15
Attending: OBSTETRICS & GYNECOLOGY
Payer: COMMERCIAL

## 2024-07-15 DIAGNOSIS — O44.40 LOW-LYING PLACENTA: ICD-10-CM

## 2024-07-15 DIAGNOSIS — O44.40 LOW-LYING PLACENTA: Primary | ICD-10-CM

## 2024-07-15 DIAGNOSIS — O26.90 PREGNANCY RELATED CONDITION, ANTEPARTUM: ICD-10-CM

## 2024-07-15 PROCEDURE — 76816 OB US FOLLOW-UP PER FETUS: CPT | Mod: 26 | Performed by: STUDENT IN AN ORGANIZED HEALTH CARE EDUCATION/TRAINING PROGRAM

## 2024-07-15 PROCEDURE — 76816 OB US FOLLOW-UP PER FETUS: CPT

## 2024-07-15 PROCEDURE — 99213 OFFICE O/P EST LOW 20 MIN: CPT | Mod: 25 | Performed by: STUDENT IN AN ORGANIZED HEALTH CARE EDUCATION/TRAINING PROGRAM

## 2024-07-15 NOTE — PROGRESS NOTES
Please see the full imaging report from the View[employer] program under the imaging tab.    Anabel Hwang MD  Maternal Fetal Medicine

## 2024-07-15 NOTE — NURSING NOTE
Patient reports good fetal movement, denies contractions, leaking of fluid, or bleeding.   SBAR given to BEAU BROWN, see their note in Epic.

## 2024-07-16 ENCOUNTER — PRENATAL OFFICE VISIT (OUTPATIENT)
Dept: OBGYN | Facility: CLINIC | Age: 34
End: 2024-07-16
Payer: COMMERCIAL

## 2024-07-16 VITALS — SYSTOLIC BLOOD PRESSURE: 104 MMHG | DIASTOLIC BLOOD PRESSURE: 66 MMHG | WEIGHT: 174 LBS | BODY MASS INDEX: 31.83 KG/M2

## 2024-07-16 DIAGNOSIS — O34.42 HISTORY OF LOOP ELECTROSURGICAL EXCISION PROCEDURE (LEEP) OF CERVIX AFFECTING PREGNANCY IN SECOND TRIMESTER: ICD-10-CM

## 2024-07-16 DIAGNOSIS — Z34.02 ENCOUNTER FOR SUPERVISION OF NORMAL FIRST PREGNANCY IN SECOND TRIMESTER: Primary | ICD-10-CM

## 2024-07-16 DIAGNOSIS — O99.810 ABNORMAL MATERNAL GLUCOSE TOLERANCE, ANTEPARTUM: Primary | ICD-10-CM

## 2024-07-16 DIAGNOSIS — Z98.890 HISTORY OF LOOP ELECTROSURGICAL EXCISION PROCEDURE (LEEP) OF CERVIX AFFECTING PREGNANCY IN SECOND TRIMESTER: ICD-10-CM

## 2024-07-16 LAB — GLUCOSE 1H P 50 G GLC PO SERPL-MCNC: 185 MG/DL (ref 70–129)

## 2024-07-16 PROCEDURE — 86780 TREPONEMA PALLIDUM: CPT | Performed by: OBSTETRICS & GYNECOLOGY

## 2024-07-16 PROCEDURE — 99207 PR PRENATAL VISIT: CPT | Performed by: OBSTETRICS & GYNECOLOGY

## 2024-07-16 PROCEDURE — 36415 COLL VENOUS BLD VENIPUNCTURE: CPT | Performed by: OBSTETRICS & GYNECOLOGY

## 2024-07-16 PROCEDURE — 82950 GLUCOSE TEST: CPT | Performed by: OBSTETRICS & GYNECOLOGY

## 2024-07-16 NOTE — PROGRESS NOTES
34-year-old G1, P0 at 28 weeks 2 days gestation.  Has a history of a LEEP.  MFM ultrasound from yesterday reviewed.  Low-lying placenta has resolved.  Cephalic presentation with estimated fetal weight 26 percentile.    Patient had reported some swelling in her hands and feet and had PIH labs drawn on July 8 which were reviewed and all normal.    GCT/Trep and TDaP today.  Return to clinic 2 weeks

## 2024-07-17 LAB — T PALLIDUM AB SER QL: NONREACTIVE

## 2024-07-24 ENCOUNTER — MYC REFILL (OUTPATIENT)
Dept: PEDIATRICS | Facility: CLINIC | Age: 34
End: 2024-07-24
Payer: COMMERCIAL

## 2024-07-25 ENCOUNTER — MYC MEDICAL ADVICE (OUTPATIENT)
Dept: PEDIATRICS | Facility: CLINIC | Age: 34
End: 2024-07-25
Payer: COMMERCIAL

## 2024-07-25 DIAGNOSIS — J31.0 CHRONIC RHINITIS: Primary | ICD-10-CM

## 2024-07-26 ENCOUNTER — LAB (OUTPATIENT)
Dept: LAB | Facility: CLINIC | Age: 34
End: 2024-07-26
Payer: COMMERCIAL

## 2024-07-26 DIAGNOSIS — O99.810 ABNORMAL MATERNAL GLUCOSE TOLERANCE, ANTEPARTUM: ICD-10-CM

## 2024-07-26 LAB
GESTATIONAL GTT 1 HR POST DOSE: 245 MG/DL (ref 60–179)
GESTATIONAL GTT 3 HR POST DOSE: 230 MG/DL (ref 60–139)
GLUCOSE P FAST SERPL-MCNC: 124 MG/DL (ref 60–94)

## 2024-07-26 PROCEDURE — 82952 GTT-ADDED SAMPLES: CPT

## 2024-07-26 PROCEDURE — 82951 GLUCOSE TOLERANCE TEST (GTT): CPT

## 2024-07-26 PROCEDURE — 36415 COLL VENOUS BLD VENIPUNCTURE: CPT

## 2024-07-26 RX ORDER — FLUTICASONE PROPIONATE 50 MCG
1 SPRAY, SUSPENSION (ML) NASAL DAILY
Qty: 9.9 ML | Refills: 0 | Status: ON HOLD | OUTPATIENT
Start: 2024-07-26 | End: 2024-10-05

## 2024-07-27 LAB — GESTATIONAL GTT 2 HR POST DOSE: 257 MG/DL (ref 60–154)

## 2024-07-30 ENCOUNTER — PRENATAL OFFICE VISIT (OUTPATIENT)
Dept: OBGYN | Facility: CLINIC | Age: 34
End: 2024-07-30
Payer: COMMERCIAL

## 2024-07-30 VITALS — BODY MASS INDEX: 32.74 KG/M2 | DIASTOLIC BLOOD PRESSURE: 70 MMHG | WEIGHT: 179 LBS | SYSTOLIC BLOOD PRESSURE: 120 MMHG

## 2024-07-30 DIAGNOSIS — O24.419 GESTATIONAL DIABETES MELLITUS (GDM) IN THIRD TRIMESTER, GESTATIONAL DIABETES METHOD OF CONTROL UNSPECIFIED: Primary | ICD-10-CM

## 2024-07-30 DIAGNOSIS — Z23 ENCOUNTER FOR IMMUNIZATION: ICD-10-CM

## 2024-07-30 PROCEDURE — 90471 IMMUNIZATION ADMIN: CPT | Performed by: OBSTETRICS & GYNECOLOGY

## 2024-07-30 PROCEDURE — 99207 PR PRENATAL VISIT: CPT | Performed by: OBSTETRICS & GYNECOLOGY

## 2024-07-30 PROCEDURE — 90715 TDAP VACCINE 7 YRS/> IM: CPT | Performed by: OBSTETRICS & GYNECOLOGY

## 2024-07-30 NOTE — NURSING NOTE
"Chief Complaint   Patient presents with    Prenatal Care     30 2/7 weeks       Initial /70 (BP Location: Right arm, Patient Position: Chair, Cuff Size: Adult Regular)   Wt 81.2 kg (179 lb)   LMP 2023 (Exact Date)   Breastfeeding No   BMI 32.74 kg/m   Estimated body mass index is 32.74 kg/m  as calculated from the following:    Height as of 24: 1.575 m (5' 2\").    Weight as of this encounter: 81.2 kg (179 lb).  BP completed using cuff size: regular    Questioned patient about current smoking habits.  Pt. has never smoked.          The following HM Due: NONE  +fetal movement  Kelly Reeder, CMA    "

## 2024-07-30 NOTE — PROGRESS NOTES
34 year old  at 30w2d     A+/RI.  It's a GIRL Jana  LLP, resolved  GDMA1, just got referral for DM Ed  Planning to   Considering IUD for PPBC    RTC 2 wks     Lorena Borrego MD, MPH  [facility]

## 2024-08-05 ENCOUNTER — VIRTUAL VISIT (OUTPATIENT)
Dept: EDUCATION SERVICES | Facility: CLINIC | Age: 34
End: 2024-08-05
Attending: OBSTETRICS & GYNECOLOGY
Payer: COMMERCIAL

## 2024-08-05 DIAGNOSIS — O24.419 GESTATIONAL DIABETES MELLITUS (GDM) IN THIRD TRIMESTER, GESTATIONAL DIABETES METHOD OF CONTROL UNSPECIFIED: Primary | ICD-10-CM

## 2024-08-05 PROCEDURE — G0109 DIAB MANAGE TRN IND/GROUP: HCPCS | Mod: 95 | Performed by: DIETITIAN, REGISTERED

## 2024-08-05 RX ORDER — LANCETS
EACH MISCELLANEOUS
Qty: 200 EACH | Refills: 3 | Status: SHIPPED | OUTPATIENT
Start: 2024-08-05

## 2024-08-05 NOTE — LETTER
8/5/2024         RE: Kate VAZQUEZ Cleo  [address] [address] [id number]        Dear Colleague,    Thank you for referring your patient, Kate Gallo, to the [facility]. Please see a copy of my visit note below.    No notes on file

## 2024-08-05 NOTE — GROUP NOTE
"Diabetes Diabetes Self-Management Education & Support  2024  [facility] Dee Sharma  Start and End Time  Start Time: 1300  End Time: 1430    Virtual Group Class via Medical Zoom    SUBJECTIVE / OBJECTIVE    Diabetes management related comments/concerns: Effects of GD for me, effects of GD for baby, do we have to use insulin or are there alternatives, does GD go away after birth, is baby at risk of diabetes when they get older? Many more questions.  Hospital planned for delivery: [facility]  Number of previous pregnancies: 0  Had any babies over 9 lbs: No  Previously had Gestational Diabetes: No  Have you ever had thyroid problems or taken thyroid medication?: No  Heart disease, mitral valve prolapse or rheumatic fever?: No  Hypertension : No  High Cholesterol: No  High Triglycerides: No  Do you use tobacco products?: No  Do you drink beer, wine or hard liquor?: No    Cultural Influences/Ethnic Background:  Not  or       Estimated Date of Delivery: Oct 6, 2024    1 hour OGTT  Lab Results   Component Value Date    GLU1 185 (H) 2024       3 hour OGTT    Fasting  No results found for: \"GLF\"    1 hour  No results found for: \"GL1\"    2 hour  No results found for: \"GL2\"    3 hour  No results found for: \"GL3\"    Lifestyle and Health Behaviors:  Pre-pregnancy weight (lbs): 156  Barrier to exercise: Physical limitation  Cultural/Orthodox diet restrictions?: No  Meal planning/habits: None  How many times a week on average do you eat food made away from home (restaurant/take-out)?: 2  Meals include: Breakfast, Lunch, Dinner, Afternoon Snack  Beverages: Water, Tea, Coffee  How many servings of fruits/vegetables per day: 2  Biggest challenges to healthy eating: Other  Pre-steve vitamin?: Yes  Supplements?: No  Experiencing nausea?: Yes  Experiencing heartburn?: Yes    Healthy Coping:  Informal Support system:: Family, Friends, Spouse    Gestational Diabetes Self-Management " Education & Support    Educational topics covered today:  GDM diagnosis, pathophysiology, Risks and Complications of GDM, Means of controlling GDM, Using a Blood Glucose Monitor, Blood Glucose Goals, Logging and Interpreting Glucose Results, Ketone Testing, When to Call a Diabetes Educator or OB Provider, Healthy Eating During Pregnancy, Counting Carbohydrates, Meal Planning for GDM, and Physical Activity    Educational materials provided today:   Nettie Understanding Gestational Diabetes  GDM Log Book  Sharps Disposal  Care After Delivery  Pt verbalized understanding of concepts discussed and recommendations provided today.     PLAN:  Check glucose 4 times daily, before breakfast and 1 hour after each meal.     Check Ketones daily for one week, if negative, reduce testing to once a week.     Follow consistent CHO meal plan, eat CHO and protein/fat at all meals/snacks.    Call/e-mail/MyChart message diabetes educator if 3 or more blood sugars are above the goal in 1 week or if ketones are positive or with questions/concerns.      Diabetes Educator:  Jory Rick RD

## 2024-08-06 ENCOUNTER — PRENATAL OFFICE VISIT (OUTPATIENT)
Dept: OBGYN | Facility: CLINIC | Age: 34
End: 2024-08-06
Payer: COMMERCIAL

## 2024-08-06 VITALS — BODY MASS INDEX: 32.9 KG/M2 | DIASTOLIC BLOOD PRESSURE: 68 MMHG | SYSTOLIC BLOOD PRESSURE: 120 MMHG | WEIGHT: 179.9 LBS

## 2024-08-06 DIAGNOSIS — Z34.03 ENCOUNTER FOR SUPERVISION OF NORMAL FIRST PREGNANCY IN THIRD TRIMESTER: Primary | ICD-10-CM

## 2024-08-06 DIAGNOSIS — O12.00 EDEMA IN PREGNANCY, ANTEPARTUM: ICD-10-CM

## 2024-08-06 DIAGNOSIS — O12.03 GESTATIONAL EDEMA IN THIRD TRIMESTER: ICD-10-CM

## 2024-08-06 DIAGNOSIS — O24.419 GESTATIONAL DIABETES MELLITUS (GDM) IN THIRD TRIMESTER, GESTATIONAL DIABETES METHOD OF CONTROL UNSPECIFIED: ICD-10-CM

## 2024-08-06 LAB
ALBUMIN MFR UR ELPH: 23 MG/DL
ALT SERPL W P-5'-P-CCNC: 17 U/L (ref 0–50)
AST SERPL W P-5'-P-CCNC: 20 U/L (ref 0–45)
BASOPHILS # BLD AUTO: 0 10E3/UL (ref 0–0.2)
BASOPHILS NFR BLD AUTO: 0 %
BUN SERPL-MCNC: 8.2 MG/DL (ref 6–20)
CREAT SERPL-MCNC: 0.56 MG/DL (ref 0.51–0.95)
CREAT UR-MCNC: 190 MG/DL
EGFRCR SERPLBLD CKD-EPI 2021: >90 ML/MIN/1.73M2
EOSINOPHIL # BLD AUTO: 0.1 10E3/UL (ref 0–0.7)
EOSINOPHIL NFR BLD AUTO: 1 %
ERYTHROCYTE [DISTWIDTH] IN BLOOD BY AUTOMATED COUNT: 12.9 % (ref 10–15)
HCT VFR BLD AUTO: 30.7 % (ref 35–47)
HGB BLD-MCNC: 10.5 G/DL (ref 11.7–15.7)
IMM GRANULOCYTES # BLD: 0.1 10E3/UL
IMM GRANULOCYTES NFR BLD: 1 %
LYMPHOCYTES # BLD AUTO: 1.5 10E3/UL (ref 0.8–5.3)
LYMPHOCYTES NFR BLD AUTO: 19 %
MCH RBC QN AUTO: 31.6 PG (ref 26.5–33)
MCHC RBC AUTO-ENTMCNC: 34.2 G/DL (ref 31.5–36.5)
MCV RBC AUTO: 93 FL (ref 78–100)
MONOCYTES # BLD AUTO: 0.6 10E3/UL (ref 0–1.3)
MONOCYTES NFR BLD AUTO: 8 %
NEUTROPHILS # BLD AUTO: 5.7 10E3/UL (ref 1.6–8.3)
NEUTROPHILS NFR BLD AUTO: 71 %
PLATELET # BLD AUTO: 244 10E3/UL (ref 150–450)
PROT/CREAT 24H UR: 0.12 MG/MG CR (ref 0–0.2)
RBC # BLD AUTO: 3.32 10E6/UL (ref 3.8–5.2)
URATE SERPL-MCNC: 3.1 MG/DL (ref 2.4–5.7)
WBC # BLD AUTO: 7.9 10E3/UL (ref 4–11)

## 2024-08-06 PROCEDURE — 36415 COLL VENOUS BLD VENIPUNCTURE: CPT | Performed by: OBSTETRICS & GYNECOLOGY

## 2024-08-06 PROCEDURE — 84156 ASSAY OF PROTEIN URINE: CPT | Performed by: OBSTETRICS & GYNECOLOGY

## 2024-08-06 PROCEDURE — 84520 ASSAY OF UREA NITROGEN: CPT | Performed by: OBSTETRICS & GYNECOLOGY

## 2024-08-06 PROCEDURE — 84450 TRANSFERASE (AST) (SGOT): CPT | Performed by: OBSTETRICS & GYNECOLOGY

## 2024-08-06 PROCEDURE — 82565 ASSAY OF CREATININE: CPT | Performed by: OBSTETRICS & GYNECOLOGY

## 2024-08-06 PROCEDURE — 85025 COMPLETE CBC W/AUTO DIFF WBC: CPT | Performed by: OBSTETRICS & GYNECOLOGY

## 2024-08-06 PROCEDURE — 99207 PR PRENATAL VISIT: CPT | Performed by: OBSTETRICS & GYNECOLOGY

## 2024-08-06 PROCEDURE — 84460 ALANINE AMINO (ALT) (SGPT): CPT | Performed by: OBSTETRICS & GYNECOLOGY

## 2024-08-06 PROCEDURE — 84550 ASSAY OF BLOOD/URIC ACID: CPT | Performed by: OBSTETRICS & GYNECOLOGY

## 2024-08-06 NOTE — NURSING NOTE
"Chief Complaint   Patient presents with    Prenatal Care   31w2d    initial /68   Wt 81.6 kg (179 lb 14.4 oz)   LMP 12/31/2023 (Exact Date)   BMI 32.90 kg/m   Estimated body mass index is 32.9 kg/m  as calculated from the following:    Height as of 5/28/24: 1.575 m (5' 2\").    Weight as of this encounter: 81.6 kg (179 lb 14.4 oz).  BP completed using cuff size regular.  Cata Remy CMA    "

## 2024-08-06 NOTE — PROGRESS NOTES
34-year-old G1, P0 at 31 weeks 2 days.  Added onto today scheduled due to swelling in her lower extremities.  She has also had some discoloration which was concerning to her.    Patient is normotensive and has been throughout the pregnancy.  Total weight gain according to our scale is only been 11 pounds.  She denies any headache or right upper quadrant pain.  PIH labs will be drawn today including urine protein.    I reassured the patient that I believe she just has a remarkable amount of pedal edema in pregnancy but does not have preeclampsia.  Labs today should be informative.  Return to clinic August 14 as scheduled unless labs indicate earlier intervention

## 2024-08-07 ENCOUNTER — NURSE TRIAGE (OUTPATIENT)
Dept: NURSING | Facility: CLINIC | Age: 34
End: 2024-08-07
Payer: COMMERCIAL

## 2024-08-08 NOTE — TELEPHONE ENCOUNTER
Pregnant. Fever, sore throat, chills, body aches.  Tested for covid twice and came back negative. What can I take while pregnant? 31 wks, 3 days. Started this morning, sore throat, chills and fever around 6 p.m. congestion. Cough from phlegm. Post nasal drip.  I told her same day appointments are released at midnight so she can call back then to see if they can get her in to see someone since she has gestational diabetes.  I told her urgent care if there are no clinic appointments available.  Marcella Antonio RN  [facility]          Reason for Disposition   Diabetes mellitus or weak immune system (e.g., HIV positive, cancer chemo, splenectomy, organ transplant, chronic steroids)   Caller requesting information about medicine during pregnancy; adult is not ill AND triager answers question    Additional Information   Negative: SEVERE difficulty breathing (e.g., struggling for each breath, speaks in single words, stridor)   Negative: Sounds like a life-threatening emergency to the triager   Negative: [1] Diagnosed strep throat AND [2] taking antibiotic AND [3] symptoms continue   Negative: Throat culture results, call about   Negative: Productive cough is main symptom   Negative: Non-productive cough is main symptom   Negative: Hoarseness is main symptom   Negative: Runny nose is main symptom   Negative: Uvula swelling is main symptom     enlarged   Negative: [1] Drooling or spitting out saliva (because can't swallow) AND [2] normal breathing   Negative: Unable to open mouth completely   Negative: [1] Difficulty breathing AND [2] not severe   Negative: Fever > 104 F (40 C)     100.2   Negative: [1] Refuses to drink anything AND [2] for > 12 hours   Negative: [1] Drinking very little AND [2] dehydration suspected (e.g., no urine > 12 hours, very dry mouth, very lightheaded)   Negative: Patient sounds very sick or weak to the triager   Negative: SEVERE (e.g., excruciating) throat pain     Pain at 7    "Negative: [1] Pus on tonsils (back of throat) AND [2]  fever AND [3] swollen neck lymph nodes (\"glands\")   Negative: [1] Rash AND [2] widespread (especially chest and abdomen)   Negative: Earache also present   Negative: Fever present > 3 days (72 hours)   Negative: [1] Intentional drug overdose AND [2] suicidal thoughts or ideas   Negative: Drug overdose and triager unable to answer question   Negative: Caller requesting a renewal or refill of a medicine patient is currently taking   Negative: Caller requesting information unrelated to medicine   Negative: Caller requesting information about COVID-19 Vaccine   Negative: Caller requesting information about Emergency Contraception   Negative: Caller requesting information about Combined Birth Control Pills   Negative: Caller requesting information about Progestin Birth Control Pills   Negative: Caller requesting information about Post-Op pain or medicines   Negative: Caller requesting a prescription antibiotic (such as Penicillin) for Strep throat and has a positive culture result   Negative: Caller requesting a prescription anti-viral med (such as Tamiflu) and has influenza (flu) symptoms   Negative: Immunization reaction suspected   Negative: Rash while taking a medicine or within 3 days of stopping it   Negative: [1] Asthma and [2] having symptoms of asthma (cough, wheezing, etc.)   Negative: [1] Symptom of illness (e.g., headache, abdominal pain, earache, vomiting) AND [2] more than mild   Negative: Breastfeeding questions about mother's medicines and diet   Negative: MORE THAN A DOUBLE DOSE of a prescription or over-the-counter (OTC) drug   Negative: [1] DOUBLE DOSE (an extra dose or lesser amount) of prescription drug AND [2] any symptoms (e.g., dizziness, nausea, pain, sleepiness)   Negative: [1] DOUBLE DOSE (an extra dose or lesser amount) of over-the-counter (OTC) drug AND [2] any symptoms (e.g., dizziness, nausea, pain, sleepiness)   Negative: Took another " person's prescription drug   Negative: [1] DOUBLE DOSE (an extra dose or lesser amount) of prescription drug AND [2] NO symptoms  (Exception: A double dose of antibiotics.)   Negative: Diabetes drug error or overdose (e.g., took wrong type of insulin or took extra dose)   Negative: [1] Prescription not at pharmacy AND [2] was prescribed by PCP recently (Exception: Triager has access to EMR and prescription is recorded there. Go to Home Care and confirm for pharmacy.)   Negative: [1] Pharmacy calling with prescription question AND [2] triager unable to answer question   Negative: [1] Caller has URGENT medicine question about med that PCP or specialist prescribed AND [2] triager unable to answer question   Negative: Medicine patch causing local rash or itching   Negative: [1] Caller has medicine question about med NOT prescribed by PCP AND [2] triager unable to answer question (e.g., compatibility with other med, storage)   Negative: Prescription request for new medicine (not a refill)   Negative: [1] Caller has NON-URGENT medicine question about med that PCP prescribed AND [2] triager unable to answer question   Negative: Caller wants to use a complementary or alternative medicine   Negative: [1] Prescription prescribed recently is not at pharmacy AND [2] triager has access to patient's EMR AND [3] prescription is recorded in the EMR   Negative: [1] DOUBLE DOSE (an extra dose or lesser amount) of over-the-counter (OTC) drug AND [2] NO symptoms   Negative: [1] DOUBLE DOSE (an extra dose or lesser amount) of antibiotic drug AND [2] NO symptoms    Protocols used: Sore Throat-A-AH, Medication Question Call-A-AH

## 2024-08-14 ENCOUNTER — PRENATAL OFFICE VISIT (OUTPATIENT)
Dept: OBGYN | Facility: CLINIC | Age: 34
End: 2024-08-14
Attending: OBSTETRICS & GYNECOLOGY
Payer: COMMERCIAL

## 2024-08-14 ENCOUNTER — VIRTUAL VISIT (OUTPATIENT)
Dept: EDUCATION SERVICES | Facility: OTHER | Age: 34
End: 2024-08-14
Payer: COMMERCIAL

## 2024-08-14 VITALS — WEIGHT: 183 LBS | SYSTOLIC BLOOD PRESSURE: 122 MMHG | DIASTOLIC BLOOD PRESSURE: 76 MMHG | BODY MASS INDEX: 33.47 KG/M2

## 2024-08-14 DIAGNOSIS — Z98.890 HISTORY OF LOOP ELECTROSURGICAL EXCISION PROCEDURE (LEEP) OF CERVIX AFFECTING PREGNANCY IN SECOND TRIMESTER: ICD-10-CM

## 2024-08-14 DIAGNOSIS — O24.419 GESTATIONAL DIABETES MELLITUS (GDM) IN THIRD TRIMESTER, GESTATIONAL DIABETES METHOD OF CONTROL UNSPECIFIED: ICD-10-CM

## 2024-08-14 DIAGNOSIS — O34.42 HISTORY OF LOOP ELECTROSURGICAL EXCISION PROCEDURE (LEEP) OF CERVIX AFFECTING PREGNANCY IN SECOND TRIMESTER: ICD-10-CM

## 2024-08-14 DIAGNOSIS — O12.03 GESTATIONAL EDEMA IN THIRD TRIMESTER: ICD-10-CM

## 2024-08-14 DIAGNOSIS — Z34.03 ENCOUNTER FOR SUPERVISION OF NORMAL FIRST PREGNANCY IN THIRD TRIMESTER: Primary | ICD-10-CM

## 2024-08-14 PROCEDURE — 99207 PR PRENATAL VISIT: CPT | Performed by: OBSTETRICS & GYNECOLOGY

## 2024-08-14 PROCEDURE — G0108 DIAB MANAGE TRN  PER INDIV: HCPCS | Mod: AE | Performed by: PHARMACIST

## 2024-08-14 NOTE — LETTER
8/14/2024         RE: Kate Gallo  [address]  [facility] [address]        Dear Colleague,    Thank you for referring your patient, Kate Gallo, to the [facility]. Please see a copy of my visit note below.    Diabetes Self-Management Education & Support  Type of service:  Video Visit    If the video visit is dropped, the video visit invitation should be resent by: Text to cell phone: [phone]    Originating Location (pt. Location): Home  Distant Location (provider location): [facility]  Mode of Communication:  Video Conference via Knowrom    Video Start Time:  1003 am  Video End Time (time video stopped): 1038am    How would patient like to obtain AVS? MyChart    SUBJECTIVE/OBJECTIVE:  Presents for education related to gestational diabetes.    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  She will speak to her manager about this option as it will take about 5 minutes to do this after lunch.    Beverly has not been hungry during the pregnancy.  She is also having nausea (taking Zofran) and gastritis.    Her current dietary intake has been:  Breakfast: greek yogurt with fruit in the bottom (1 carb choice)    Lunch: cafeteria food at work - rice bowl with veges/salmon OR salad OR turkey sandwich (0-3 carb choices, depending on the amount of rice in the rice bowl)    Supper: grilling meat, garden fresh veges, potatoes (2 carb choices)?    No milk and little fruit.   No snacks currently.    She has had a terrible head cold the last few days.  She laid in bed all weekend and didn't feel up to testing at all, even though she got her supplies from the pharmacy on Friday.  She still is congested today.      Cultural Influences/Ethnic Background:  Not  or       LMP 12/31/2023 (Exact Date)     11.3 kg (24 lb 14.4 oz)  GA: 32w3d      Estimated  Date of Delivery: Oct 6, 2024    Blood Glucose/Ketone Log:   Date Ketones Fasting Post Breakfast Post Lunch Post Supper   8/12 Neg 111 108 150 missed   8/13 Neg 115 111 140 150   8/14 Neg 115                                             Current Management:  Diet and exercise    ASSESSMENT:  Ketones: negative.   Fasting blood glucoses: 0% in target.  After breakfast: 100% in target.  After lunch: 50% in target.  After dinner: 0% in target.    Beverly has just started testing.  Surprisingly, her ketones are negative and she is only getting about 5 carb choices per day.  Explained that the minimum is 12 carb choices per day in pregnancy but she has not been able to do this because she is not hungry and she is nauseated all day.  Her morning fasting sugars are HIGH.  Told her to try eating 30g of carb with protein at bedtime (discussed options) for the next 2 nights and message us her blood sugars/ketone readings on Friday.    We did review insulin injection technique today in anticipation that she may need it.  Video link also sent in [employer] today for her reference.  Current weight is 81kg.    INTERVENTION:  Educational topics covered today:  What to expect after delivery, Future testing for Type 2 diabetes (2 hour OGTT at 6 week post-partum check-up and annual fasting blood glucose level), Risk of GDM and planning ahead for future pregnancies, Recommended lifestyle interventions for reducing the risk of Type 2 Diabetes, When to Call a Diabetes Educator or OB Provider    Educational Materials provided today:  [place] Preventing Diabetes - will be mailed, along with all materials from class last week as the link is not working for her to access them.    PLAN:  Eat a protein bar with breakfast along with your yogurt as you are not getting enough carbs with breakfast currently.    When eating a salad at work, mix in some carbs like a side of fruit or bread or even both.    *Eat a bedtime snack - 30g of carb with protein.  This  may help your morning blood sugars.    Message us with your blood sugars and ketones Friday morning.  You may need to start insulin soon if your morning blood sugars don't improve.    Insulin instruction done today but here is a link to the video for reference:    https://[email or web address]/humulin-n-[email or web address]    Blood glucose testing:   Check glucose 4 times daily, before breakfast and 1 hour after each meal.  If you miss the 1 hour after meal reading, get a 2 hour after meal reading.  Set a timer when you start eating your meals to check your blood glucose in 1 hour.  Record your readings in your log book.     2. Ketone testing:  Check Ketones daily.  When you have 7 negative tests in a row, you can decrease testing to once weekly.  Continue to record your results with your blood sugars.     3. Physical activity recommendations:  Physical activity recommended: 10-15 min walk after meals.     4.  Diabetes in Pregnancy Meal Plan:  2-3 carb choices at breakfast, 3-4 carb choices at lunch, 3-4 carb choices at supper, 1-2 carb choices at 3 snacks a day- in between meals and at bedtime.      1 carb choice = 15 grams of carbohydrate.    Follow the consistent carbohydrate meal plan as above, and eat a combination of carbohydrate/protein/fat at all meals and snacks.         5.  BG testing goals:  Morning fasting BG goal is less than or = to 95  1 hour post meal BG goal is less than or = to 140  2 hour post meal BG goal is less than or = to 120      *Call/MyChart message diabetes educator if 3 or more blood sugars are above the goal in 1 week, if ketones are positive, or with any questions/concerns.      Estefanía Espinal, PharmD, [facility] and [place] [facility]    [place] [facility] Education and Nutrition Services for the [facility]:  For Your Diabetes Education and Nutrition Appointments Call:  [phone]   For Diabetes Education or Nutrition Related Questions:   Phone:  [phone]  OR  Send Curriculet Message       If you need a medication refill please contact your pharmacy. Please allow 3 business days for your refills to be completed.    Estefanía Espinal, PharmD, [facility], [facility] and [place] [facility]    Time Spent: 35 minutes  Encounter Type: Individual    Any diabetes medication dose changes were made via the CDE Protocol and Collaborative Practice Agreement with the patient's referring provider. A copy of this encounter was shared with the provider.

## 2024-08-14 NOTE — PATIENT INSTRUCTIONS
Eat a protein bar with breakfast along with your yogurt as you are not getting enough carbs with breakfast currently.    When eating a salad at work, mix in some carbs like a side of fruit or bread or even both.    *Eat a bedtime snack - 30g of carb with protein.  This may help your morning blood sugars.    Message us with your blood sugars and ketones Friday morning.  You may need to start insulin soon if your morning blood sugars don't improve.    Insulin instruction done today but here is a link to the video for reference:    https://[email or web address]/humulin-n-[email or web address]    Blood glucose testing:   Check glucose 4 times daily, before breakfast and 1 hour after each meal.  If you miss the 1 hour after meal reading, get a 2 hour after meal reading.  Set a timer when you start eating your meals to check your blood glucose in 1 hour.  Record your readings in your log book.     2. Ketone testing:  Check Ketones daily.  When you have 7 negative tests in a row, you can decrease testing to once weekly.  Continue to record your results with your blood sugars.     3. Physical activity recommendations:  Physical activity recommended: 10-15 min walk after meals.     4.  Diabetes in Pregnancy Meal Plan:  2-3 carb choices at breakfast, 3-4 carb choices at lunch, 3-4 carb choices at supper, 1-2 carb choices at 3 snacks a day- in between meals and at bedtime.      1 carb choice = 15 grams of carbohydrate.    Follow the consistent carbohydrate meal plan as above, and eat a combination of carbohydrate/protein/fat at all meals and snacks.         5.  BG testing goals:  Morning fasting BG goal is less than or = to 95  1 hour post meal BG goal is less than or = to 140  2 hour post meal BG goal is less than or = to 120      *Call/MyChart message diabetes educator if 3 or more blood sugars are above the goal in 1 week, if ketones are positive, or with any questions/concerns.      Estefanía Espinal, PharmD, [facility] and  [place] [facility]    [place] Diabetes Education and Nutrition Services for the [facility]:  For Your Diabetes Education and Nutrition Appointments Call:  [phone]   For Diabetes Education or Nutrition Related Questions:   Phone: [phone]  OR  Send WishGenie Message       If you need a medication refill please contact your pharmacy. Please allow 3 business days for your refills to be completed.

## 2024-08-14 NOTE — PROGRESS NOTES
Diabetes Self-Management Education & Support  Type of service:  Video Visit    If the video visit is dropped, the video visit invitation should be resent by: Text to cell phone: [phone]    Originating Location (pt. Location): Home  Distant Location (provider location): [facility]  Mode of Communication:  Video Conference via [employer]    Video Start Time:  1003 am  Video End Time (time video stopped): 1038am    How would patient like to obtain AVS? MyChart    SUBJECTIVE/OBJECTIVE:  Presents for education related to gestational diabetes.    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  She will speak to her manager about this option as it will take about 5 minutes to do this after lunch.    Beverly has not been hungry during the pregnancy.  She is also having nausea (taking Zofran) and gastritis.    Her current dietary intake has been:  Breakfast: greek yogurt with fruit in the bottom (1 carb choice)    Lunch: cafeteria food at work - rice bowl with veges/salmon OR salad OR turkey sandwich (0-3 carb choices, depending on the amount of rice in the rice bowl)    Supper: grilling meat, garden fresh veges, potatoes (2 carb choices)?    No milk and little fruit.   No snacks currently.    She has had a terrible head cold the last few days.  She laid in bed all weekend and didn't feel up to testing at all, even though she got her supplies from the pharmacy on Friday.  She still is congested today.      Cultural Influences/Ethnic Background:  Not  or       LMP 12/31/2023 (Exact Date)     11.3 kg (24 lb 14.4 oz)  GA: 32w3d      Estimated Date of Delivery: Oct 6, 2024    Blood Glucose/Ketone Log:   Date Ketones Fasting Post Breakfast Post Lunch Post Supper   8/12 Neg 111 108 150 missed   8/13 Neg 115 111 140 150   8/14 Neg 115                                             Current Management:  Diet and  exercise    ASSESSMENT:  Ketones: negative.   Fasting blood glucoses: 0% in target.  After breakfast: 100% in target.  After lunch: 50% in target.  After dinner: 0% in target.    Beverly has just started testing.  Surprisingly, her ketones are negative and she is only getting about 5 carb choices per day.  Explained that the minimum is 12 carb choices per day in pregnancy but she has not been able to do this because she is not hungry and she is nauseated all day.  Her morning fasting sugars are HIGH.  Told her to try eating 30g of carb with protein at bedtime (discussed options) for the next 2 nights and message us her blood sugars/ketone readings on Friday.    We did review insulin injection technique today in anticipation that she may need it.  Video link also sent in [place] today for her reference.  Current weight is 81kg.    INTERVENTION:  Educational topics covered today:  What to expect after delivery, Future testing for Type 2 diabetes (2 hour OGTT at 6 week post-partum check-up and annual fasting blood glucose level), Risk of GDM and planning ahead for future pregnancies, Recommended lifestyle interventions for reducing the risk of Type 2 Diabetes, When to Call a Diabetes Educator or OB Provider    Educational Materials provided today:  [place] Preventing Diabetes - will be mailed, along with all materials from class last week as the link is not working for her to access them.    PLAN:  Eat a protein bar with breakfast along with your yogurt as you are not getting enough carbs with breakfast currently.    When eating a salad at work, mix in some carbs like a side of fruit or bread or even both.    *Eat a bedtime snack - 30g of carb with protein.  This may help your morning blood sugars.    Message us with your blood sugars and ketones Friday morning.  You may need to start insulin soon if your morning blood sugars don't improve.    Insulin instruction done today but here is a link to the video for  reference:    [email or web address]-[email or web address]    Blood glucose testing:   Check glucose 4 times daily, before breakfast and 1 hour after each meal.  If you miss the 1 hour after meal reading, get a 2 hour after meal reading.  Set a timer when you start eating your meals to check your blood glucose in 1 hour.  Record your readings in your log book.     2. Ketone testing:  Check Ketones daily.  When you have 7 negative tests in a row, you can decrease testing to once weekly.  Continue to record your results with your blood sugars.     3. Physical activity recommendations:  Physical activity recommended: 10-15 min walk after meals.     4.  Diabetes in Pregnancy Meal Plan:  2-3 carb choices at breakfast, 3-4 carb choices at lunch, 3-4 carb choices at supper, 1-2 carb choices at 3 snacks a day- in between meals and at bedtime.      1 carb choice = 15 grams of carbohydrate.    Follow the consistent carbohydrate meal plan as above, and eat a combination of carbohydrate/protein/fat at all meals and snacks.         5.  BG testing goals:  Morning fasting BG goal is less than or = to 95  1 hour post meal BG goal is less than or = to 140  2 hour post meal BG goal is less than or = to 120      *Call/MyChart message diabetes educator if 3 or more blood sugars are above the goal in 1 week, if ketones are positive, or with any questions/concerns.      Estefanía Espinal PharmD, [facility] and [place] [facility]    [place] Diabetes Education and Nutrition Services for the [facility]:  For Your Diabetes Education and Nutrition Appointments Call:  [phone]   For Diabetes Education or Nutrition Related Questions:   Phone: [phone]  OR  Send MyChart Message       If you need a medication refill please contact your pharmacy. Please allow 3 business days for your refills to be completed.    Estefanía Espinal PharmD, MADHU, [facility] and [place] [facility]    [place]  Spent: 35 minutes  Encounter Type: Individual    Any diabetes medication dose changes were made via the CDE Protocol and Collaborative Practice Agreement with the patient's referring provider. A copy of this encounter was shared with the provider.

## 2024-08-14 NOTE — PROGRESS NOTES
34-year-old  at 32 weeks and 3 days presents for routine visit.  Patient has GDM A1 with satisfactory control to date.  Continues to have gestational edema but no other symptoms.  Baby is active.  No regular contractions.  Feels well.  Return to clinic 2 weeks

## 2024-08-14 NOTE — NURSING NOTE
"Chief Complaint   Patient presents with    Prenatal Care   32w3d    initial /76   Wt 83 kg (183 lb)   LMP 12/31/2023 (Exact Date)   BMI 33.47 kg/m   Estimated body mass index is 33.47 kg/m  as calculated from the following:    Height as of 5/28/24: 1.575 m (5' 2\").    Weight as of this encounter: 83 kg (183 lb).  BP completed using cuff size regular.  Cata Remy CMA    "

## 2024-08-15 PROBLEM — M54.50 ACUTE LOW BACK PAIN, UNSPECIFIED BACK PAIN LATERALITY, UNSPECIFIED WHETHER SCIATICA PRESENT: Status: RESOLVED | Noted: 2024-07-10 | Resolved: 2024-08-15

## 2024-08-15 NOTE — PROGRESS NOTES
DISCHARGE  Reason for Discharge: Patient has failed to schedule further appointments.    Equipment Issued: none    Discharge Plan: Patient to continue home program.    Referring Provider:  Daniel Muñoz

## 2024-08-18 ENCOUNTER — MYC MEDICAL ADVICE (OUTPATIENT)
Dept: EDUCATION SERVICES | Facility: OTHER | Age: 34
End: 2024-08-18
Payer: COMMERCIAL

## 2024-08-18 DIAGNOSIS — O24.419 GESTATIONAL DIABETES MELLITUS (GDM) IN THIRD TRIMESTER, GESTATIONAL DIABETES METHOD OF CONTROL UNSPECIFIED: Primary | ICD-10-CM

## 2024-08-19 RX ORDER — PEN NEEDLE, DIABETIC 32GX 5/32"
NEEDLE, DISPOSABLE MISCELLANEOUS
Qty: 100 EACH | Refills: 3 | Status: SHIPPED | OUTPATIENT
Start: 2024-08-19

## 2024-08-19 NOTE — TELEPHONE ENCOUNTER
Gestational Diabetes Follow-up    Subjective/Objective:    Kate Gallo sent in blood glucose log for review. Last date of communication was: 8/14.    Gestational diabetes is being managed with diet and activity    Taking diabetes medications: no    Estimated Date of Delivery: Oct 6, 2024    BG/Food Log:       Assessment:    Ketones: neg.   Fasting blood glucoses: 0% in target.  After breakfast: 66% in target.  Before lunch: -% in target.  After lunch: 75% in target.  Before dinner: -% in target.  After dinner: 66% in target.    Plan/Response:  Recommend that patient begin NPH insulin. Consider CGM for monitoring.  GDM insulin dose recommendations: (183 lbs)  83 kg x 0.1-0.2 = 8-16 units as a starting dose, titrate as needed.  Patient is hesitant to start insulin recommend she start at 0.1 unit(s)/kg    Benefits check:      Portillo Paul,    Thank you for sending in your blood sugars. I'm glad to hear you are adding your bedtime snack and working on some other things to help your blood sugars. I see your fasting blood sugar was down to 99 yesterday morning. Great job! My concern with waiting another week, is that is another week of high blood sugars. Even though you haven't been testing for very long, your 1 hour glucose test was high on 7/16 which means your blood sugars have been high for awhile. The other thing to consider is, you still have a good 6-7 weeks before your due date. The pregnancy hormones will continue to get stronger making it challenging to control your blood sugars as the pregnancy progresses. I would recommend starting insulin now. We can start you at a really small dose of 8 units at bedtime. The insulin will give your pancreas a little help so it doesn't have to work so hard. Keep in mind due to the pregnancy hormones your body has to make 2-3 times the amount of insulin compared to when you are not pregnant. You can be doing everything right but the hormones are just to strong for your  body. Also note that the insulin goes to your body to control your blood sugars and does not go to your baby.     As far as the sore fingers, we can prescribe a continuous glucose monitor such as the Dexcom or Arian sensor. I would still recommend doing a fingerstick glucose every morning, but it may help with the soreness and simplify the testing throughout the day. You would just need to look at your phone/reader at the time your test is due.  Your insurance requires you to be on insulin and will need a prior authorization.     I will send in the prescription for the insulin. You can review the insulin injection instruction video Estefanía sent you. It is a cloudy insulin so just make sure you rock it back and forth to mix it well before using it, and refrigerate the pens you are not using.     Let us know if you would like us send in a prescription for the sensor and start the prior authorization process, and if you have a preference on the Dexcom or the Arian 3 sensor.      Scarlet PEREZ, RN, PHN, [facility]       Any diabetes medication dose changes were made via the CDE Protocol and Collaborative Practice Agreement with the patient's referring provider. A copy of this encounter was shared with the provider.

## 2024-08-20 RX ORDER — BLOOD-GLUCOSE SENSOR
1 EACH MISCELLANEOUS CONTINUOUS
Qty: 2 EACH | Refills: 11 | Status: SHIPPED | OUTPATIENT
Start: 2024-08-20

## 2024-08-21 ENCOUNTER — LAB (OUTPATIENT)
Dept: LAB | Facility: CLINIC | Age: 34
End: 2024-08-21
Payer: COMMERCIAL

## 2024-08-21 DIAGNOSIS — N89.8 VAGINAL ITCHING: ICD-10-CM

## 2024-08-21 LAB
CLUE CELLS: ABNORMAL
TRICHOMONAS, WET PREP: ABNORMAL
WBC'S/HIGH POWER FIELD, WET PREP: ABNORMAL
YEAST, WET PREP: ABNORMAL

## 2024-08-21 PROCEDURE — 87210 SMEAR WET MOUNT SALINE/INK: CPT

## 2024-08-23 ENCOUNTER — TELEPHONE (OUTPATIENT)
Dept: EDUCATION SERVICES | Facility: CLINIC | Age: 34
End: 2024-08-23
Payer: COMMERCIAL

## 2024-08-23 ENCOUNTER — MYC MEDICAL ADVICE (OUTPATIENT)
Dept: EDUCATION SERVICES | Facility: CLINIC | Age: 34
End: 2024-08-23
Payer: COMMERCIAL

## 2024-08-23 ENCOUNTER — MYC MEDICAL ADVICE (OUTPATIENT)
Dept: OBGYN | Facility: CLINIC | Age: 34
End: 2024-08-23
Payer: COMMERCIAL

## 2024-08-23 NOTE — TELEPHONE ENCOUNTER
Left message on voice mail that she can my chart her letter details or expect a call from me around 4pm  Cata Remy CMA

## 2024-08-23 NOTE — TELEPHONE ENCOUNTER
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   Letter written, signed and left a the [place]  for  on 8/27/24  Cata Remy CMA

## 2024-08-23 NOTE — TELEPHONE ENCOUNTER
Hi  please see mychart message from patient.  Could you provide a letter for work regarding meal and snack breaks to manage blood sugars?     Aleah Emery MS, RD, LD, CDE

## 2024-08-23 NOTE — LETTER
August 23, 2024      Kate Gallo  [address]  [facility] [address]        To Whom It May Concern:        Kate Gallo Is currently pregnant with a due date of 10/6/24.  She has a diagnosis of gestational diabetes.  This requires her to have regular breaks to eat snacks to control her blood sugar.  She will also need time to leave the IV room to record her glucose readings.           Sincerely,          Daniel Muñoz MD

## 2024-08-23 NOTE — TELEPHONE ENCOUNTER
Left message on voice mail for patient to call back re: letter.  Dr. Muñoz is happy to write the letter but we need details please.  I can speak with Beverly when she calls back.   Cata Remy CMA

## 2024-08-23 NOTE — TELEPHONE ENCOUNTER
Gestational Diabetes Follow-up    Subjective/Objective:    Kate VAZQUEZ Cleo sent in blood glucose log for review. Last date of communication was: 8/20/24.    Gestational diabetes is being managed with diet, activity, and medications    Taking diabetes medications: yes:     Diabetes Medication(s)       Insulin       insulin  UNIT/ML injection Inject 8 units before bed, plus a 2 unit prime dose            Estimated Date of Delivery: Oct 6, 2024    BG/Food Log:         Assessment:  Not connected to team [employer] account. Fasting blood sugars remain elevated, recommend viewing CGM data to determine if mealtime insulin OR 2nd dose of NPH at breakfast would be more appropriate given post lunch is at top goal limit.  Appears that blood sugars rise during the day, Recommend reassessing Monday to see if daytime NPH dose still needed and if she is tolerating it better.  IF she continues to have injection site issues, then recommend switching to long acting insulin.    Ketones: negative.   Fasting blood glucoses: 40% in target.  After breakfast: 100% in target.  Before lunch: -% in target.  After lunch: 100% in target.  Before dinner: -% in target.  After dinner: 0% in target.    Plan/Response:  Recommend increase to NPH: 0-0-0-8 --> 0-0-0-10.  Connect to Clar[employer] and assess daytime blood sugars.     Aleah Emery MS, RD, LD, CDE      Any diabetes medication dose changes were made via the CDE Protocol and Collaborative Practice Agreement with the patient's OB/GYN provider. A copy of this encounter was shared with the provider.

## 2024-08-26 DIAGNOSIS — O24.419 GESTATIONAL DIABETES MELLITUS (GDM) IN THIRD TRIMESTER, GESTATIONAL DIABETES METHOD OF CONTROL UNSPECIFIED: Primary | ICD-10-CM

## 2024-08-26 NOTE — CONFIDENTIAL NOTE
New encounter started to address most recent concerns.    Britt Vlilela RN, [facility]

## 2024-08-26 NOTE — TELEPHONE ENCOUNTER
Dr. Muñoz,    I think Beverly seems to be having an allergic reaction to the Humulin N insulin.  She also has some elevated blood sugars during the day and I think would benefit from trying a basal insulin.  Her insurance prefers Semglee (biosimilar to Lantus), so I have pended that RX for you if you mitzy with plan.     Thanks!    Britt Villela RN, [facility]

## 2024-08-27 ENCOUNTER — TRANSCRIBE ORDERS (OUTPATIENT)
Dept: MATERNAL FETAL MEDICINE | Facility: CLINIC | Age: 34
End: 2024-08-27

## 2024-08-27 ENCOUNTER — PRENATAL OFFICE VISIT (OUTPATIENT)
Dept: OBGYN | Facility: CLINIC | Age: 34
End: 2024-08-27
Attending: OBSTETRICS & GYNECOLOGY
Payer: COMMERCIAL

## 2024-08-27 VITALS
BODY MASS INDEX: 33.47 KG/M2 | OXYGEN SATURATION: 96 % | DIASTOLIC BLOOD PRESSURE: 76 MMHG | HEART RATE: 109 BPM | WEIGHT: 183 LBS | SYSTOLIC BLOOD PRESSURE: 120 MMHG

## 2024-08-27 DIAGNOSIS — Z98.890 HISTORY OF LOOP ELECTROSURGICAL EXCISION PROCEDURE (LEEP) OF CERVIX AFFECTING PREGNANCY IN SECOND TRIMESTER: ICD-10-CM

## 2024-08-27 DIAGNOSIS — O24.419 GDM, CLASS A2: Primary | ICD-10-CM

## 2024-08-27 DIAGNOSIS — O34.42 HISTORY OF LOOP ELECTROSURGICAL EXCISION PROCEDURE (LEEP) OF CERVIX AFFECTING PREGNANCY IN SECOND TRIMESTER: ICD-10-CM

## 2024-08-27 DIAGNOSIS — O12.03 GESTATIONAL EDEMA IN THIRD TRIMESTER: ICD-10-CM

## 2024-08-27 DIAGNOSIS — Z34.03 ENCOUNTER FOR SUPERVISION OF NORMAL FIRST PREGNANCY IN THIRD TRIMESTER: ICD-10-CM

## 2024-08-27 DIAGNOSIS — O26.90 PREGNANCY RELATED CONDITION, ANTEPARTUM: Primary | ICD-10-CM

## 2024-08-27 PROCEDURE — 99207 PR PRENATAL VISIT: CPT | Performed by: OBSTETRICS & GYNECOLOGY

## 2024-08-27 RX ORDER — INSULIN GLARGINE-YFGN 100 [IU]/ML
8 INJECTION, SOLUTION SUBCUTANEOUS AT BEDTIME
Qty: 15 ML | Refills: 1 | Status: ON HOLD | OUTPATIENT
Start: 2024-08-27 | End: 2024-10-05

## 2024-08-27 NOTE — PROGRESS NOTES
34-year-old  at 34 weeks 2 days gestation.  Now is being treated for GDM A2.  Had some sensitivity to the initial insulin formulation so a secondary formulation is being attempted.    Explained that with GDM A2 twice weekly biophysical profiles and Q 4 week scans for fetal growth are recommended and a referral to [facility] was made so that this might be initiated.  We also discussed early delivery based on the degree of control.    Her gestational anemia present previously seems to have stabilized.  Baby is active.  Return to clinic 2 weeks

## 2024-08-29 ASSESSMENT — PATIENT HEALTH QUESTIONNAIRE - PHQ9
SUM OF ALL RESPONSES TO PHQ QUESTIONS 1-9: 12
SUM OF ALL RESPONSES TO PHQ QUESTIONS 1-9: 12
10. IF YOU CHECKED OFF ANY PROBLEMS, HOW DIFFICULT HAVE THESE PROBLEMS MADE IT FOR YOU TO DO YOUR WORK, TAKE CARE OF THINGS AT HOME, OR GET ALONG WITH OTHER PEOPLE: SOMEWHAT DIFFICULT

## 2024-08-30 ENCOUNTER — OFFICE VISIT (OUTPATIENT)
Dept: PEDIATRICS | Facility: CLINIC | Age: 34
End: 2024-08-30
Payer: COMMERCIAL

## 2024-08-30 VITALS
BODY MASS INDEX: 34.11 KG/M2 | WEIGHT: 186.5 LBS | SYSTOLIC BLOOD PRESSURE: 102 MMHG | TEMPERATURE: 97.8 F | RESPIRATION RATE: 18 BRPM | HEART RATE: 100 BPM | OXYGEN SATURATION: 97 % | DIASTOLIC BLOOD PRESSURE: 70 MMHG

## 2024-08-30 DIAGNOSIS — L91.8 SKIN TAG: Primary | ICD-10-CM

## 2024-08-30 DIAGNOSIS — D22.9 ATYPICAL MOLE: ICD-10-CM

## 2024-08-30 PROCEDURE — 99213 OFFICE O/P EST LOW 20 MIN: CPT | Performed by: NURSE PRACTITIONER

## 2024-08-30 NOTE — PATIENT INSTRUCTIONS
[facility]  [facility]    After Cryotherapy    The treated area will become red soon after your procedure. It also may blister and swell. If this happens, don t break open the blister.   You may also see clear drainage on the treated area. This is normal. The treated area will heal in about 7 to 10 days. It will probably not leave a scar.    Caring for yourself after cryotherapy  Starting the day after your procedure, wash the treated area gently with fragrance-free soap and water daily.  Leave the treated area uncovered. Ifyou have any drainainge, you can cover the area with a bandage (Band-Aid ).  If the treated area develops a crust, you can apply petroleum jelly (Vaseline ) on until the crust falls off.  If you have any bleeding, press firmly on the area with a clean gauze pad for 15 minutes. If the bleeding doesn t stop, repeat this step. If the bleeding still hasn t stopped after repeating this step, call your doctor s office.  Don t use scented soap, makeup, or lotion on the treated area until it has healed. This will usually be at least 10 days after your procedure.  You may lose some hair on the treated area. This depends on how deep the freezing went. The hair loss may be permanent.  Once the treated area has healed, apply a broad-spectrum sunscreen with an SPF of at least 30 to the area to protect it from scarring.  You may have discoloration (pinkness, redness, or lighter or darker skin) at the treated area for up to 1 year after your procedure. Some people may have it for even longer or it may be permanent.    Call Your Doctor or Nurse if You Have:  A temperature of 100.4  F (38  C) or higher  Chills  Any of the following symptoms at or around the treated area:  Redness or swelling that extends to areas of untreated skin  Increasing pain or discomfort in the treated area  Skin in the treated area that s hot or hard to the touch  Increasing oozing, or drainage (yellow or green) from  the treated area  A bad smell  Bleeding that doesn t stop after applying pressure  Any questions or concerns  Any problems you didn t expect

## 2024-08-30 NOTE — PROGRESS NOTES
Assessment & Plan     Skin tag  Appeared for the first time in her first trimester. Has not changed since onset. She would like it removed as she feels it will interfere with her ability to pump and breast feed successfully.     Consent:  Risks and benefits of therapy discussed with patient who voices understanding and agrees with planned care. No barriers to communication or understanding identified.  Procedure: After obtaining verbal informed consent, cryotherapy with liquid nitrogen was applied, 4 treatments of 3 seconds each applied to skin tag. Frost ring obtained. Pt tolerated the procedure well. Education:  Aftercare, including blister formation, risks of bleeding, and risks of recurrence were discussed. All questions answered.  Return for retreatment in 2 weeks if needed.  - Adult Dermatology  Referral; Future    Atypical mole  Referral for routine skin check post delivery. Moles she pointed out today do not look suspicious.   - Adult Dermatology  Referral; Future        Depression Screening Follow Up        8/29/2024     8:33 AM   PHQ   PHQ-9 Total Score 12   Q9: Thoughts of better off dead/self-harm past 2 weeks Not at all         Follow Up Actions Taken  Crisis resource information provided in After Visit Summary  This is related to not sleeping and pregnancy, no safety concerns.         Marco Paul is a 34 year old, presenting for the following health issues:  Derm Problem      8/30/2024    11:18 AM   Additional Questions   Roomed by Leslee Mello   Accompanied by NA     Removal os kin tag. Noticed during first trimester, throughout her entire pregnancy she has developed several more skin tags, not as big or prominent, neck, chest, back.           Objective    /70 (BP Location: Right arm, Patient Position: Sitting, Cuff Size: Adult Regular)   Pulse 100   Temp 97.8  F (36.6  C) (Tympanic)   Resp 18   Wt 84.6 kg (186 lb 8 oz)   LMP 12/31/2023 (Exact Date)   SpO2 97%    BMI 34.11 kg/m    Body mass index is 34.11 kg/m .  Physical Exam  Constitutional:       Appearance: Normal appearance.   Cardiovascular:      Rate and Rhythm: Normal rate.   Pulmonary:      Effort: Pulmonary effort is normal. No respiratory distress.   Skin:     Comments: Small skin tag to border of left areola at 2 o'clock position.    Neurological:      General: No focal deficit present.      Mental Status: She is alert and oriented to person, place, and time.   Psychiatric:         Mood and Affect: Mood normal.         Behavior: Behavior normal.                    Signed Electronically by: YANET Salter CNP

## 2024-09-04 ENCOUNTER — MYC MEDICAL ADVICE (OUTPATIENT)
Dept: EDUCATION SERVICES | Facility: OTHER | Age: 34
End: 2024-09-04
Payer: COMMERCIAL

## 2024-09-05 ENCOUNTER — MYC MEDICAL ADVICE (OUTPATIENT)
Dept: OBGYN | Facility: CLINIC | Age: 34
End: 2024-09-05

## 2024-09-05 ENCOUNTER — HOSPITAL ENCOUNTER (OUTPATIENT)
Dept: ULTRASOUND IMAGING | Facility: CLINIC | Age: 34
Discharge: HOME | End: 2024-09-05
Attending: OBSTETRICS & GYNECOLOGY
Payer: COMMERCIAL

## 2024-09-05 ENCOUNTER — OFFICE VISIT (OUTPATIENT)
Dept: MATERNAL FETAL MEDICINE | Facility: CLINIC | Age: 34
End: 2024-09-05
Attending: OBSTETRICS & GYNECOLOGY
Payer: COMMERCIAL

## 2024-09-05 DIAGNOSIS — O26.90 PREGNANCY RELATED CONDITION, ANTEPARTUM: ICD-10-CM

## 2024-09-05 DIAGNOSIS — O24.414 INSULIN CONTROLLED GESTATIONAL DIABETES MELLITUS (GDM) DURING PREGNANCY, ANTEPARTUM: Primary | ICD-10-CM

## 2024-09-05 PROCEDURE — 76819 FETAL BIOPHYS PROFIL W/O NST: CPT | Mod: 26 | Performed by: OBSTETRICS & GYNECOLOGY

## 2024-09-05 PROCEDURE — 76819 FETAL BIOPHYS PROFIL W/O NST: CPT

## 2024-09-05 NOTE — NURSING NOTE
Patient presents to [facility] for BPP at 35w4d due to GDM A2 well controlled on Insulin. Positive fetal movement. Denies LOF, vaginal bleeding or cramping/contractions. SBAR given to M MD, see their note in Epic.

## 2024-09-05 NOTE — PROGRESS NOTES
Please see full imaging report from [employer] program under imaging tab.    Kirsten Cerrato MD  Maternal Fetal Medicine

## 2024-09-06 ENCOUNTER — HOSPITAL ENCOUNTER (OUTPATIENT)
Dept: ULTRASOUND IMAGING | Facility: CLINIC | Age: 34
Discharge: HOME | End: 2024-09-06
Attending: OBSTETRICS & GYNECOLOGY | Admitting: OBSTETRICS & GYNECOLOGY
Payer: COMMERCIAL

## 2024-09-06 ENCOUNTER — PRENATAL OFFICE VISIT (OUTPATIENT)
Dept: OBGYN | Facility: CLINIC | Age: 34
End: 2024-09-06
Payer: COMMERCIAL

## 2024-09-06 VITALS — DIASTOLIC BLOOD PRESSURE: 72 MMHG | WEIGHT: 190 LBS | SYSTOLIC BLOOD PRESSURE: 104 MMHG | BODY MASS INDEX: 34.75 KG/M2

## 2024-09-06 DIAGNOSIS — M79.606: Primary | ICD-10-CM

## 2024-09-06 DIAGNOSIS — M79.606: ICD-10-CM

## 2024-09-06 DIAGNOSIS — O26.893: Primary | ICD-10-CM

## 2024-09-06 DIAGNOSIS — M79.18 PAIN IN SYMPHYSIS PUBIS DURING PREGNANCY: ICD-10-CM

## 2024-09-06 DIAGNOSIS — O26.893: ICD-10-CM

## 2024-09-06 DIAGNOSIS — O99.891 PAIN IN SYMPHYSIS PUBIS DURING PREGNANCY: ICD-10-CM

## 2024-09-06 PROCEDURE — 93971 EXTREMITY STUDY: CPT | Mod: LT

## 2024-09-06 PROCEDURE — 99207 PR COMPLICATED OB VISIT: CPT | Performed by: OBSTETRICS & GYNECOLOGY

## 2024-09-06 NOTE — PROGRESS NOTES
Pt here for problem visit. Pt states she developed left groin pain and increased LLE edema in last 2 days. She finds the pain is deep in her groin. Fetus active, no VB, SROM, UCs. Exam-Fundus NT. FHTs 145. Left groin area slightly tender, no definite hernia. Exts - Bilateral 4+ edema, symmetrical. No calf tenderness bilaterally, no erythema bilaterally. Will check LLE Doppler today as precaution. Suspect this is normal discomforts and edema of pregnancy. Will get abd binder. Had MFM BPP yesterday , Vtx. Has another BPP in 4 days w/ OB visit. Fetal movement counts BID,  labor/premature rupture of membranes precautions reviewed.  RTC 4 day(s).    Encounter Diagnoses   Name Primary?    Pregnancy related lower extremity pain in third trimester, antepartum Yes    Pain in symphysis pubis during pregnancy        Risk factors listed above are stable and being addressed as noted.    Yayo Ray MD  [facility] [place]

## 2024-09-06 NOTE — TELEPHONE ENCOUNTER
Call to pt to discuss symptoms and a possible appt today.  Left message to call back.     Sarai FERNANDEZ RN  OB/GYN [place]

## 2024-09-06 NOTE — NURSING NOTE
"Chief Complaint   Patient presents with    Prenatal Care     35 weeks 5 days- left sided groin pain        Initial /72 (BP Location: Right arm, Patient Position: Sitting, Cuff Size: Adult Regular)   Wt 86.2 kg (190 lb)   LMP 2023 (Exact Date)   BMI 34.75 kg/m   Estimated body mass index is 34.75 kg/m  as calculated from the following:    Height as of 24: 1.575 m (5' 2\").    Weight as of this encounter: 86.2 kg (190 lb).  BP completed using cuff size: regular    Questioned patient about current smoking habits.  Pt. has never smoked.          The following HM Due: NONE    35w5d  Sohail Renae CMA                "

## 2024-09-06 NOTE — TELEPHONE ENCOUNTER
"Pt having swelling in her legs for several weeks. MD has been monitoring at prenatal visits. Had labs done 8/6 for Pre-eclampsia work up.  Last few days left leg is more swollen then right leg. Feels like she had less mobility in left leg. Feels that left foot turns purple in color and at times tingling. Gets worse as day goes on.     Feel like she has \"deep pain\" in left inner thigh close to the groin. Painful to walk and to sit.     Denies contractions, vaginal bleeding, or leaking of fluid. Normal fetal movement.     Appt made for today at 0930 with Dr Ray.    Sarai FERNANDEZ RN  OB/GYN [place]          "

## 2024-09-10 ENCOUNTER — HOSPITAL ENCOUNTER (OUTPATIENT)
Dept: ULTRASOUND IMAGING | Facility: CLINIC | Age: 34
Discharge: HOME | End: 2024-09-10
Attending: OBSTETRICS & GYNECOLOGY
Payer: COMMERCIAL

## 2024-09-10 ENCOUNTER — OFFICE VISIT (OUTPATIENT)
Dept: MATERNAL FETAL MEDICINE | Facility: CLINIC | Age: 34
End: 2024-09-10
Attending: OBSTETRICS & GYNECOLOGY
Payer: COMMERCIAL

## 2024-09-10 ENCOUNTER — PRENATAL OFFICE VISIT (OUTPATIENT)
Dept: OBGYN | Facility: CLINIC | Age: 34
End: 2024-09-10
Payer: COMMERCIAL

## 2024-09-10 VITALS — SYSTOLIC BLOOD PRESSURE: 124 MMHG | DIASTOLIC BLOOD PRESSURE: 80 MMHG | WEIGHT: 193.4 LBS | BODY MASS INDEX: 35.37 KG/M2

## 2024-09-10 DIAGNOSIS — Z34.03 ENCOUNTER FOR SUPERVISION OF NORMAL FIRST PREGNANCY IN THIRD TRIMESTER: Primary | ICD-10-CM

## 2024-09-10 DIAGNOSIS — O26.90 PREGNANCY RELATED CONDITION, ANTEPARTUM: ICD-10-CM

## 2024-09-10 DIAGNOSIS — Z23 NEED FOR PROPHYLACTIC VACCINATION AND INOCULATION AGAINST INFLUENZA: ICD-10-CM

## 2024-09-10 DIAGNOSIS — O24.414 INSULIN CONTROLLED GESTATIONAL DIABETES MELLITUS (GDM) DURING PREGNANCY, ANTEPARTUM: Primary | ICD-10-CM

## 2024-09-10 DIAGNOSIS — O24.419 GDM, CLASS A2: ICD-10-CM

## 2024-09-10 PROCEDURE — 90472 IMMUNIZATION ADMIN EACH ADD: CPT | Performed by: STUDENT IN AN ORGANIZED HEALTH CARE EDUCATION/TRAINING PROGRAM

## 2024-09-10 PROCEDURE — 90678 RSV VACC PREF BIVALENT IM: CPT | Performed by: STUDENT IN AN ORGANIZED HEALTH CARE EDUCATION/TRAINING PROGRAM

## 2024-09-10 PROCEDURE — 87653 STREP B DNA AMP PROBE: CPT | Performed by: STUDENT IN AN ORGANIZED HEALTH CARE EDUCATION/TRAINING PROGRAM

## 2024-09-10 PROCEDURE — 90656 IIV3 VACC NO PRSV 0.5 ML IM: CPT | Performed by: STUDENT IN AN ORGANIZED HEALTH CARE EDUCATION/TRAINING PROGRAM

## 2024-09-10 PROCEDURE — 76819 FETAL BIOPHYS PROFIL W/O NST: CPT

## 2024-09-10 PROCEDURE — 99207 PR COMPLICATED OB VISIT: CPT | Performed by: STUDENT IN AN ORGANIZED HEALTH CARE EDUCATION/TRAINING PROGRAM

## 2024-09-10 PROCEDURE — 90471 IMMUNIZATION ADMIN: CPT | Performed by: STUDENT IN AN ORGANIZED HEALTH CARE EDUCATION/TRAINING PROGRAM

## 2024-09-10 PROCEDURE — 76819 FETAL BIOPHYS PROFIL W/O NST: CPT | Mod: 26 | Performed by: OBSTETRICS & GYNECOLOGY

## 2024-09-10 PROCEDURE — 76816 OB US FOLLOW-UP PER FETUS: CPT | Mod: 26 | Performed by: OBSTETRICS & GYNECOLOGY

## 2024-09-10 NOTE — PROGRESS NOTES
[facility]  Return OB Visit    S:  Kate Gallo is a 34 year old  who presents at 36w2d for IZABELLA.    - Today she is doing well.   - She denies CTX, LOF, VB. +FM.   - LE edema present but unchanged  - Working closely with DE  - Denies HA, vision changes, CP, SOB, RUQ pain  - BPP with MFM later today    O:  /80 (BP Location: Right arm, Cuff Size: Adult Regular)   Wt 87.7 kg (193 lb 6.4 oz)   LMP 2023 (Exact Date)   BMI 35.37 kg/m    Body mass index is 35.37 kg/m .  Total Weight Gain: 17.4 kg (38 lb 6.4 oz)    GBS collected; Chaperoned by Nita Mg MA    A/P:  Kate Gallo is a 34 year old  who presents at 36w2d.    GDMA2 - Working with DE. MFM  testing  Flu and RSV vax today  LE edema - stable. Recent neg doppler  Hx LEEP    Return to clinic in 1 week, sooner if concerns. Reviewed fetal kick counts, PTL, and PreE signs.    Cristian Mae MD MPH  [facility]  09/10/2024 3:05 PM

## 2024-09-10 NOTE — TELEPHONE ENCOUNTER
Gestational Diabetes Follow-up    Subjective/Objective:    Kate Gallo sent in blood glucose log for review. Last date of communication was: 8-26-24.    Gestational diabetes is being managed with diet, activity, and medications    Taking diabetes medications: yes:     Diabetes Medication(s)       Insulin       Insulin Glargine-yfgn (SEMGLEE, YFGN,) 100 UNIT/ML SOPN Inject 8 Units subcutaneously at bedtime. Max TDD is 25 units a directed by provider.            Estimated Date of Delivery: Oct 6, 2024    BG/Food Log:             Assessment:    Ketones: not reported.     9/5 through 9/9 reviewed:  Fasting blood glucoses: 80% (4 out of 5)  After breakfast: 20% in target.  After lunch: 100% in target.  After dinner: 80% in target.    Plan/Response:  No changes in the patient's current treatment plan, but need to watch breakfast.  Eating too many carbs or high carb foods like banana bread, oatmeal.  Follow-up in 1 week.    Estefanía Espinal, PharmD, [facility], [facility] and [facility]      Any diabetes medication dose changes were made via the CDE Protocol and Collaborative Practice Agreement with the patient's referring provider. A copy of this encounter was shared with the provider.

## 2024-09-10 NOTE — NURSING NOTE
"Chief Complaint   Patient presents with    Prenatal Care     36 weeks 2 days   BPP today  & more scheduled   GDMA2  GBS today        Initial /80 (BP Location: Right arm, Cuff Size: Adult Regular)   Wt 87.7 kg (193 lb 6.4 oz)   LMP 2023 (Exact Date)   BMI 35.37 kg/m   Estimated body mass index is 35.37 kg/m  as calculated from the following:    Height as of 24: 1.575 m (5' 2\").    Weight as of this encounter: 87.7 kg (193 lb 6.4 oz).  BP completed using cuff size: regular    Questioned patient about current smoking habits.  Pt. has never smoked.    36w2d         The following HM Due: NONE      +FM Daily  GBS today         Nita Mg, CMA on 9/10/2024 at 2:41 PM             "

## 2024-09-10 NOTE — NURSING NOTE
Patient reports fetal movement, denies pain, contractions, leaking of fluid, or bleeding.  Reports blood sugar values controlled well on 8units HS. Patient denies headache, visual changes, nausea/vomiting, epigastric pain related to preeclampsia. SBAR given to BEAU BROWN, see their note in Epic.

## 2024-09-11 LAB — GP B STREP DNA SPEC QL NAA+PROBE: NEGATIVE

## 2024-09-13 ENCOUNTER — HOSPITAL ENCOUNTER (OUTPATIENT)
Dept: ULTRASOUND IMAGING | Facility: CLINIC | Age: 34
Discharge: HOME | End: 2024-09-13
Attending: OBSTETRICS & GYNECOLOGY | Admitting: OBSTETRICS & GYNECOLOGY
Payer: COMMERCIAL

## 2024-09-13 ENCOUNTER — OFFICE VISIT (OUTPATIENT)
Dept: MATERNAL FETAL MEDICINE | Facility: CLINIC | Age: 34
End: 2024-09-13
Attending: OBSTETRICS & GYNECOLOGY
Payer: COMMERCIAL

## 2024-09-13 DIAGNOSIS — O24.414 INSULIN CONTROLLED GESTATIONAL DIABETES MELLITUS (GDM) IN THIRD TRIMESTER: Primary | ICD-10-CM

## 2024-09-13 DIAGNOSIS — O26.90 PREGNANCY RELATED CONDITION, ANTEPARTUM: ICD-10-CM

## 2024-09-13 PROCEDURE — 76819 FETAL BIOPHYS PROFIL W/O NST: CPT

## 2024-09-13 PROCEDURE — 76819 FETAL BIOPHYS PROFIL W/O NST: CPT | Mod: 26 | Performed by: STUDENT IN AN ORGANIZED HEALTH CARE EDUCATION/TRAINING PROGRAM

## 2024-09-13 NOTE — PROGRESS NOTES
The patient was seen for an [facility] in the [facility] clinic today.  For a detailed report of the ultrasound examination, please see the ultrasound report which can be found under the imaging tab.    If you have questions regarding today's evaluation or if we can be of further service, please contact the [facility].    Douglas Hernadez M.D.  Maternal Fetal-Medicine Specialist

## 2024-09-13 NOTE — NURSING NOTE
Patient reports fetal movement, denies pain, contractions, leaking of fluid, or bleeding.  Reports blood sugar values within range. No changes in insulin. Taking 8units HS. Patient denies headache, visual changes, nausea/vomiting, epigastric pain related to preeclampsia. SBAR given to BEAU BROWN, see their note in Epic.

## 2024-09-17 ENCOUNTER — OFFICE VISIT (OUTPATIENT)
Dept: MATERNAL FETAL MEDICINE | Facility: CLINIC | Age: 34
End: 2024-09-17
Attending: OBSTETRICS & GYNECOLOGY
Payer: COMMERCIAL

## 2024-09-17 ENCOUNTER — PRENATAL OFFICE VISIT (OUTPATIENT)
Dept: OBGYN | Facility: CLINIC | Age: 34
End: 2024-09-17
Attending: OBSTETRICS & GYNECOLOGY
Payer: COMMERCIAL

## 2024-09-17 ENCOUNTER — HOSPITAL ENCOUNTER (OUTPATIENT)
Dept: ULTRASOUND IMAGING | Facility: CLINIC | Age: 34
Discharge: HOME | End: 2024-09-17
Attending: OBSTETRICS & GYNECOLOGY
Payer: COMMERCIAL

## 2024-09-17 VITALS — BODY MASS INDEX: 34.93 KG/M2 | WEIGHT: 191 LBS | SYSTOLIC BLOOD PRESSURE: 126 MMHG | DIASTOLIC BLOOD PRESSURE: 80 MMHG

## 2024-09-17 DIAGNOSIS — Z98.890 HISTORY OF LOOP ELECTROSURGICAL EXCISION PROCEDURE (LEEP) OF CERVIX AFFECTING PREGNANCY IN SECOND TRIMESTER: ICD-10-CM

## 2024-09-17 DIAGNOSIS — Z34.03 ENCOUNTER FOR SUPERVISION OF NORMAL FIRST PREGNANCY IN THIRD TRIMESTER: Primary | ICD-10-CM

## 2024-09-17 DIAGNOSIS — O26.90 PREGNANCY RELATED CONDITION, ANTEPARTUM: ICD-10-CM

## 2024-09-17 DIAGNOSIS — O24.419 GDM, CLASS A2: ICD-10-CM

## 2024-09-17 DIAGNOSIS — O24.414 INSULIN CONTROLLED GESTATIONAL DIABETES MELLITUS (GDM) IN THIRD TRIMESTER: Primary | ICD-10-CM

## 2024-09-17 DIAGNOSIS — O34.42 HISTORY OF LOOP ELECTROSURGICAL EXCISION PROCEDURE (LEEP) OF CERVIX AFFECTING PREGNANCY IN SECOND TRIMESTER: ICD-10-CM

## 2024-09-17 PROCEDURE — 99207 PR PRENATAL VISIT: CPT | Performed by: OBSTETRICS & GYNECOLOGY

## 2024-09-17 PROCEDURE — 76819 FETAL BIOPHYS PROFIL W/O NST: CPT | Mod: 26 | Performed by: OBSTETRICS & GYNECOLOGY

## 2024-09-17 PROCEDURE — 76819 FETAL BIOPHYS PROFIL W/O NST: CPT

## 2024-09-17 NOTE — PROGRESS NOTES
34-year-old G1, P0 at 37 weeks 4 days with GDM A2 and a history of LEEP.  She also has significant lower extremity edema that has been stable and she has ruled out for preeclampsia.  Baby is active and blood sugar control is satisfactory.  We discussed the recommendation for induction between 39 and 39 weeks and 6 days with GDM A2.  She indicates a desire to wait till later end of that range hoping for spontaneous labor.  Return to clinic 1 week

## 2024-09-17 NOTE — PROGRESS NOTES
Please refer to ultrasound report under 'Imaging' Studies of 'Chart Review' tabs.    Cecilio Mack M.D.

## 2024-09-17 NOTE — NURSING NOTE
"Chief Complaint   Patient presents with    Prenatal Care   37w2d    initial Wt 86.6 kg (191 lb)   LMP 12/31/2023 (Exact Date)   BMI 34.93 kg/m   Estimated body mass index is 34.93 kg/m  as calculated from the following:    Height as of 5/28/24: 1.575 m (5' 2\").    Weight as of this encounter: 86.6 kg (191 lb).  BP completed using cuff size regular.  Cata Remy CMA    "

## 2024-09-17 NOTE — NURSING NOTE
Patient reports fetal movement, denies pain, contractions, leaking of fluid, or bleeding.  Reports blood sugar values are stable. No changes in insulin.  Patient denies headache, visual changes, nausea/vomiting, epigastric pain related to preeclampsia. Does have increased lower extremity swelling.  Education provided to patient on compression stockings and elevating feet when able.  SBAR given to BEAU BROWN, see their note in Epic.

## 2024-09-18 ENCOUNTER — MYC REFILL (OUTPATIENT)
Dept: EDUCATION SERVICES | Facility: OTHER | Age: 34
End: 2024-09-18
Payer: COMMERCIAL

## 2024-09-18 DIAGNOSIS — O24.419 GESTATIONAL DIABETES MELLITUS (GDM) IN THIRD TRIMESTER, GESTATIONAL DIABETES METHOD OF CONTROL UNSPECIFIED: ICD-10-CM

## 2024-09-18 RX ORDER — BLOOD-GLUCOSE SENSOR
1 EACH MISCELLANEOUS CONTINUOUS
Qty: 2 EACH | Refills: 11 | Status: CANCELLED | OUTPATIENT
Start: 2024-09-18

## 2024-09-18 NOTE — TELEPHONE ENCOUNTER
See message below from pt, is there any reason she can't use the Arian 2 sensor?  I am not sure how to answer this.    Angelita HARRIS RN BSN  [facility]

## 2024-09-20 ENCOUNTER — OFFICE VISIT (OUTPATIENT)
Dept: MATERNAL FETAL MEDICINE | Facility: CLINIC | Age: 34
End: 2024-09-20
Attending: OBSTETRICS & GYNECOLOGY
Payer: COMMERCIAL

## 2024-09-20 ENCOUNTER — TELEPHONE (OUTPATIENT)
Dept: EDUCATION SERVICES | Facility: CLINIC | Age: 34
End: 2024-09-20

## 2024-09-20 ENCOUNTER — HOSPITAL ENCOUNTER (OUTPATIENT)
Dept: ULTRASOUND IMAGING | Facility: CLINIC | Age: 34
Discharge: HOME | End: 2024-09-20
Attending: OBSTETRICS & GYNECOLOGY | Admitting: OBSTETRICS & GYNECOLOGY
Payer: COMMERCIAL

## 2024-09-20 DIAGNOSIS — O26.90 PREGNANCY RELATED CONDITION, ANTEPARTUM: ICD-10-CM

## 2024-09-20 DIAGNOSIS — O24.414 INSULIN CONTROLLED GESTATIONAL DIABETES MELLITUS (GDM) IN THIRD TRIMESTER: Primary | ICD-10-CM

## 2024-09-20 PROCEDURE — 76819 FETAL BIOPHYS PROFIL W/O NST: CPT

## 2024-09-20 PROCEDURE — 76819 FETAL BIOPHYS PROFIL W/O NST: CPT | Mod: 26 | Performed by: STUDENT IN AN ORGANIZED HEALTH CARE EDUCATION/TRAINING PROGRAM

## 2024-09-20 RX ORDER — BLOOD-GLUCOSE SENSOR
EACH MISCELLANEOUS
Qty: 6 EACH | Refills: 1 | Status: SHIPPED | OUTPATIENT
Start: 2024-09-20

## 2024-09-20 NOTE — TELEPHONE ENCOUNTER
Gestational Diabetes Follow-up    Subjective/Objective:    Kate Gallo sent in blood glucose log for review. Last date of communication was: 9/18/24.    Gestational diabetes is being managed with diet, activity, and medications    Taking diabetes medications: yes:     Diabetes Medication(s)       Insulin       Insulin Glargine-yfgn (SEMGLEE, YFGN,) 100 UNIT/ML SOPN Inject 8 Units subcutaneously at bedtime. Max TDD is 25 units a directed by provider.            Estimated Date of Delivery: Oct 6, 2024    BG/Food Log:   Thank you so much! The pharmacy was able to fill that one so I just started it. My glucose levels yesterday were the following;  Keytones: negative  Wake up: 97  Couldn t get breakfast reading due to work  1 hour after lunch: 151  1 hour after dinner: 157  Insulin: 8U  Today s wake up: 84          Assessment:  Blood sugars slightly elevated.  Recommend increase to insulin.    Plan/Response:  Recommend increase to insulin - Semglee 0-0-0-8 --> 0-0-0-9.  Update Tuesday.    Aleah Emery MS, RD, LD, CDE      Any diabetes medication dose changes were made via the CDE Protocol and Collaborative Practice Agreement with the patient's OB/GYN provider. A copy of this encounter was shared with the provider.

## 2024-09-20 NOTE — PROGRESS NOTES
The patient was seen for an ultrasound in the [facility] today.  For a detailed report of the ultrasound examination, please see the ultrasound report which can be found under the imaging tab.     If you have questions regarding today's evaluation or if we can be of further service, please contact the [facility].    Anabel Hwang MD  Maternal Fetal Medicine

## 2024-09-20 NOTE — TELEPHONE ENCOUNTER
From the patient-    Thank you so much! The pharmacy was able to fill that one so I just started it. My glucose levels yesterday were the following;  Keytones: negative  Wake up: 97  Couldn t get breakfast reading due to work  1 hour after lunch: 151  1 hour after dinner: 157  Insulin: 8U  Today s wake up: 84  Thanks!

## 2024-09-20 NOTE — NURSING NOTE
Patient reports active fetal movement, denies pain, contractions, leaking of fluid, or bleeding.  Reports blood sugar values stable on 8units HS. Patient denies headache, visual changes, nausea/vomiting, epigastric pain related to preeclampsia.  SBAR given to BEAU BROWN, see their note in Epic.

## 2024-09-24 ENCOUNTER — HOSPITAL ENCOUNTER (OUTPATIENT)
Dept: ULTRASOUND IMAGING | Facility: CLINIC | Age: 34
Discharge: HOME | End: 2024-09-24
Attending: OBSTETRICS & GYNECOLOGY
Payer: COMMERCIAL

## 2024-09-24 ENCOUNTER — OFFICE VISIT (OUTPATIENT)
Dept: MATERNAL FETAL MEDICINE | Facility: CLINIC | Age: 34
End: 2024-09-24
Attending: OBSTETRICS & GYNECOLOGY
Payer: COMMERCIAL

## 2024-09-24 ENCOUNTER — PRENATAL OFFICE VISIT (OUTPATIENT)
Dept: OBGYN | Facility: CLINIC | Age: 34
End: 2024-09-24
Attending: OBSTETRICS & GYNECOLOGY
Payer: COMMERCIAL

## 2024-09-24 VITALS — DIASTOLIC BLOOD PRESSURE: 74 MMHG | SYSTOLIC BLOOD PRESSURE: 122 MMHG | WEIGHT: 195 LBS | BODY MASS INDEX: 35.67 KG/M2

## 2024-09-24 DIAGNOSIS — O34.42 HISTORY OF LOOP ELECTROSURGICAL EXCISION PROCEDURE (LEEP) OF CERVIX AFFECTING PREGNANCY IN SECOND TRIMESTER: ICD-10-CM

## 2024-09-24 DIAGNOSIS — O24.414 INSULIN CONTROLLED GESTATIONAL DIABETES MELLITUS (GDM) IN THIRD TRIMESTER: Primary | ICD-10-CM

## 2024-09-24 DIAGNOSIS — O24.419 GDM, CLASS A2: ICD-10-CM

## 2024-09-24 DIAGNOSIS — Z98.890 HISTORY OF LOOP ELECTROSURGICAL EXCISION PROCEDURE (LEEP) OF CERVIX AFFECTING PREGNANCY IN SECOND TRIMESTER: ICD-10-CM

## 2024-09-24 DIAGNOSIS — Z34.03 ENCOUNTER FOR SUPERVISION OF NORMAL FIRST PREGNANCY IN THIRD TRIMESTER: Primary | ICD-10-CM

## 2024-09-24 DIAGNOSIS — O12.00 EDEMA IN PREGNANCY, ANTEPARTUM: ICD-10-CM

## 2024-09-24 DIAGNOSIS — O24.414 INSULIN CONTROLLED GESTATIONAL DIABETES MELLITUS (GDM) DURING PREGNANCY, ANTEPARTUM: ICD-10-CM

## 2024-09-24 PROCEDURE — 99207 PR PRENATAL VISIT: CPT | Performed by: OBSTETRICS & GYNECOLOGY

## 2024-09-24 PROCEDURE — 76819 FETAL BIOPHYS PROFIL W/O NST: CPT

## 2024-09-24 PROCEDURE — 76819 FETAL BIOPHYS PROFIL W/O NST: CPT | Mod: 26 | Performed by: OBSTETRICS & GYNECOLOGY

## 2024-09-24 NOTE — PROGRESS NOTES
34-year-old G1, P0 at 38 weeks 2 days.  Pregnancy complicated by GDM A2 with good control.  To date cervix has been unfavorable.  We had previously discussed induction at 39+ weeks.  Patient and her spouse brought up the option of an elective primary  section should her cervix be unfavorable in that timeframe.  Pros cons risks and benefits were reviewed.  Return to clinic in 1 week at which time final decision regarding route of delivery and/or scheduling of induction will be done

## 2024-09-24 NOTE — NURSING NOTE
Patient reports fetal movement. Has pregnancy discomforts. Denies regular contractions, leaking of fluid, or bleeding.  Reports blood sugar values stable on 8units Insulin HS. Patient denies headache, visual changes, nausea/vomiting, epigastric pain related to preeclampsia. SBAR given to BEAU BROWN, see their note in Epic.

## 2024-09-24 NOTE — NURSING NOTE
"Chief Complaint   Patient presents with    Prenatal Care   38w2d    initial /74   Wt 88.5 kg (195 lb)   LMP 12/31/2023 (Exact Date)   BMI 35.67 kg/m   Estimated body mass index is 35.67 kg/m  as calculated from the following:    Height as of 5/28/24: 1.575 m (5' 2\").    Weight as of this encounter: 88.5 kg (195 lb).  BP completed using cuff size regular.  Cata Remy CMA    "

## 2024-10-01 ENCOUNTER — HOSPITAL ENCOUNTER (OUTPATIENT)
Dept: ULTRASOUND IMAGING | Facility: CLINIC | Age: 34
Discharge: HOME | End: 2024-10-01
Attending: OBSTETRICS & GYNECOLOGY
Payer: COMMERCIAL

## 2024-10-01 ENCOUNTER — OFFICE VISIT (OUTPATIENT)
Dept: MATERNAL FETAL MEDICINE | Facility: CLINIC | Age: 34
End: 2024-10-01
Attending: OBSTETRICS & GYNECOLOGY
Payer: COMMERCIAL

## 2024-10-01 ENCOUNTER — TELEPHONE (OUTPATIENT)
Dept: OBGYN | Facility: CLINIC | Age: 34
End: 2024-10-01

## 2024-10-01 ENCOUNTER — PRENATAL OFFICE VISIT (OUTPATIENT)
Dept: OBGYN | Facility: CLINIC | Age: 34
End: 2024-10-01
Attending: OBSTETRICS & GYNECOLOGY
Payer: COMMERCIAL

## 2024-10-01 VITALS — DIASTOLIC BLOOD PRESSURE: 82 MMHG | SYSTOLIC BLOOD PRESSURE: 130 MMHG | WEIGHT: 195 LBS | BODY MASS INDEX: 35.67 KG/M2

## 2024-10-01 DIAGNOSIS — Z98.890 HISTORY OF LOOP ELECTROSURGICAL EXCISION PROCEDURE (LEEP) OF CERVIX AFFECTING PREGNANCY IN SECOND TRIMESTER: ICD-10-CM

## 2024-10-01 DIAGNOSIS — O34.42 HISTORY OF LOOP ELECTROSURGICAL EXCISION PROCEDURE (LEEP) OF CERVIX AFFECTING PREGNANCY IN SECOND TRIMESTER: ICD-10-CM

## 2024-10-01 DIAGNOSIS — Z34.03 ENCOUNTER FOR SUPERVISION OF NORMAL FIRST PREGNANCY IN THIRD TRIMESTER: Primary | ICD-10-CM

## 2024-10-01 DIAGNOSIS — O21.9 NAUSEA AND VOMITING DURING PREGNANCY: ICD-10-CM

## 2024-10-01 DIAGNOSIS — O12.00 EDEMA IN PREGNANCY, ANTEPARTUM: ICD-10-CM

## 2024-10-01 DIAGNOSIS — O24.419 GDM, CLASS A2: ICD-10-CM

## 2024-10-01 DIAGNOSIS — O24.414 INSULIN CONTROLLED GESTATIONAL DIABETES MELLITUS (GDM) IN THIRD TRIMESTER: Primary | ICD-10-CM

## 2024-10-01 DIAGNOSIS — O24.414 INSULIN CONTROLLED GESTATIONAL DIABETES MELLITUS (GDM) IN THIRD TRIMESTER: ICD-10-CM

## 2024-10-01 PROCEDURE — 76819 FETAL BIOPHYS PROFIL W/O NST: CPT | Mod: 26 | Performed by: STUDENT IN AN ORGANIZED HEALTH CARE EDUCATION/TRAINING PROGRAM

## 2024-10-01 PROCEDURE — 76819 FETAL BIOPHYS PROFIL W/O NST: CPT

## 2024-10-01 PROCEDURE — 99207 PR PRENATAL VISIT: CPT | Performed by: OBSTETRICS & GYNECOLOGY

## 2024-10-01 RX ORDER — ONDANSETRON 4 MG/1
4 TABLET, FILM COATED ORAL EVERY 8 HOURS PRN
Qty: 20 TABLET | Refills: 0 | Status: ON HOLD | OUTPATIENT
Start: 2024-10-01 | End: 2024-10-05

## 2024-10-01 NOTE — TELEPHONE ENCOUNTER
Patient Name: Kate Gallo   MRN: [id number]   Case#: [id number]   Surgeons and Role:      * Daniel Muñoz MD - Primary   Date requested: 10/2/2024   Location: [facility] L[facility]   Procedure(s):    SECTION (N/A)
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
## 2024-10-01 NOTE — TELEPHONE ENCOUNTER
Type of surgery:  section  Location of surgery: [facility]  Date and time of surgery: 10/2/24 @ 7:30 am  Surgeon: Dr. Muñoz  Pre-Op Appt Date: @ prenatal appointment  Post-Op Appt Date: 24   Packet sent out: Yes  Pre-cert/Authorization completed:  No  Date: 10/1/24

## 2024-10-01 NOTE — NURSING NOTE
Beverly presents to [facility] for BPP due to GDMA2. Patient reports good fetal movement, denies contractions, leaking of fluid, or bleeding.  Reports blood sugar values have been controlled.   SBAR given to NEO MD, see their note in Epic.

## 2024-10-02 ENCOUNTER — HOSPITAL ENCOUNTER (INPATIENT)
Facility: CLINIC | Age: 34
LOS: 3 days | Discharge: HOME HEALTH | End: 2024-10-05
Attending: OBSTETRICS & GYNECOLOGY | Admitting: OBSTETRICS & GYNECOLOGY
Payer: COMMERCIAL

## 2024-10-02 ENCOUNTER — ANESTHESIA (OUTPATIENT)
Dept: OBGYN | Facility: CLINIC | Age: 34
End: 2024-10-02
Payer: COMMERCIAL

## 2024-10-02 ENCOUNTER — ANESTHESIA EVENT (OUTPATIENT)
Dept: OBGYN | Facility: CLINIC | Age: 34
End: 2024-10-02
Payer: COMMERCIAL

## 2024-10-02 LAB
ABO/RH(D): NORMAL
ALBUMIN SERPL BCG-MCNC: 2.9 G/DL (ref 3.5–5.2)
ALP SERPL-CCNC: 195 U/L (ref 40–150)
ALT SERPL W P-5'-P-CCNC: 23 U/L (ref 0–50)
ANION GAP SERPL CALCULATED.3IONS-SCNC: 12 MMOL/L (ref 7–15)
ANTIBODY SCREEN: NEGATIVE
AST SERPL W P-5'-P-CCNC: 34 U/L (ref 0–45)
BILIRUB SERPL-MCNC: 0.2 MG/DL
BUN SERPL-MCNC: 14 MG/DL (ref 6–20)
CALCIUM SERPL-MCNC: 9.4 MG/DL (ref 8.8–10.4)
CHLORIDE SERPL-SCNC: 105 MMOL/L (ref 98–107)
CREAT SERPL-MCNC: 0.77 MG/DL (ref 0.51–0.95)
EGFRCR SERPLBLD CKD-EPI 2021: >90 ML/MIN/1.73M2
ERYTHROCYTE [DISTWIDTH] IN BLOOD BY AUTOMATED COUNT: 14 % (ref 10–15)
GLUCOSE BLDC GLUCOMTR-MCNC: 81 MG/DL (ref 70–99)
GLUCOSE SERPL-MCNC: 88 MG/DL (ref 70–99)
HCO3 SERPL-SCNC: 17 MMOL/L (ref 22–29)
HCT VFR BLD AUTO: 32.9 % (ref 35–47)
HGB BLD-MCNC: 10.6 G/DL (ref 11.7–15.7)
HGB BLD-MCNC: 10.6 G/DL (ref 11.7–15.7)
MCH RBC QN AUTO: 28.9 PG (ref 26.5–33)
MCHC RBC AUTO-ENTMCNC: 32.2 G/DL (ref 31.5–36.5)
MCV RBC AUTO: 90 FL (ref 78–100)
PLATELET # BLD AUTO: 201 10E3/UL (ref 150–450)
POTASSIUM SERPL-SCNC: 4.6 MMOL/L (ref 3.4–5.3)
PROT SERPL-MCNC: 5.6 G/DL (ref 6.4–8.3)
RBC # BLD AUTO: 3.67 10E6/UL (ref 3.8–5.2)
SODIUM SERPL-SCNC: 134 MMOL/L (ref 135–145)
SPECIMEN EXPIRATION DATE: NORMAL
T PALLIDUM AB SER QL: NONREACTIVE
WBC # BLD AUTO: 8.7 10E3/UL (ref 4–11)

## 2024-10-02 PROCEDURE — 258N000003 HC RX IP 258 OP 636: Performed by: NURSE ANESTHETIST, CERTIFIED REGISTERED

## 2024-10-02 PROCEDURE — 250N000011 HC RX IP 250 OP 636: Performed by: OBSTETRICS & GYNECOLOGY

## 2024-10-02 PROCEDURE — 370N000017 HC ANESTHESIA TECHNICAL FEE, PER MIN: Performed by: OBSTETRICS & GYNECOLOGY

## 2024-10-02 PROCEDURE — 80053 COMPREHEN METABOLIC PANEL: CPT | Performed by: OBSTETRICS & GYNECOLOGY

## 2024-10-02 PROCEDURE — 250N000009 HC RX 250: Performed by: OBSTETRICS & GYNECOLOGY

## 2024-10-02 PROCEDURE — 250N000011 HC RX IP 250 OP 636: Performed by: ANESTHESIOLOGY

## 2024-10-02 PROCEDURE — 272N000001 HC OR GENERAL SUPPLY STERILE: Performed by: OBSTETRICS & GYNECOLOGY

## 2024-10-02 PROCEDURE — 120N000001 HC R&B MED SURG/OB

## 2024-10-02 PROCEDURE — 86780 TREPONEMA PALLIDUM: CPT | Performed by: OBSTETRICS & GYNECOLOGY

## 2024-10-02 PROCEDURE — 250N000009 HC RX 250: Performed by: NURSE ANESTHETIST, CERTIFIED REGISTERED

## 2024-10-02 PROCEDURE — 86850 RBC ANTIBODY SCREEN: CPT | Performed by: OBSTETRICS & GYNECOLOGY

## 2024-10-02 PROCEDURE — 250N000011 HC RX IP 250 OP 636: Performed by: NURSE ANESTHETIST, CERTIFIED REGISTERED

## 2024-10-02 PROCEDURE — 86900 BLOOD TYPING SEROLOGIC ABO: CPT | Performed by: OBSTETRICS & GYNECOLOGY

## 2024-10-02 PROCEDURE — 250N000013 HC RX MED GY IP 250 OP 250 PS 637: Performed by: OBSTETRICS & GYNECOLOGY

## 2024-10-02 PROCEDURE — 85027 COMPLETE CBC AUTOMATED: CPT | Performed by: OBSTETRICS & GYNECOLOGY

## 2024-10-02 PROCEDURE — 85018 HEMOGLOBIN: CPT | Performed by: OBSTETRICS & GYNECOLOGY

## 2024-10-02 PROCEDURE — 258N000003 HC RX IP 258 OP 636: Performed by: OBSTETRICS & GYNECOLOGY

## 2024-10-02 PROCEDURE — 59510 CESAREAN DELIVERY: CPT | Performed by: OBSTETRICS & GYNECOLOGY

## 2024-10-02 PROCEDURE — 360N000076 HC SURGERY LEVEL 3, PER MIN: Performed by: OBSTETRICS & GYNECOLOGY

## 2024-10-02 PROCEDURE — 710N000009 HC RECOVERY PHASE 1, LEVEL 1, PER MIN: Performed by: OBSTETRICS & GYNECOLOGY

## 2024-10-02 RX ORDER — MISOPROSTOL 200 UG/1
400 TABLET ORAL
Status: DISCONTINUED | OUTPATIENT
Start: 2024-10-02 | End: 2024-10-05 | Stop reason: HOSPADM

## 2024-10-02 RX ORDER — CARBOPROST TROMETHAMINE 250 UG/ML
250 INJECTION, SOLUTION INTRAMUSCULAR
Status: DISCONTINUED | OUTPATIENT
Start: 2024-10-02 | End: 2024-10-02

## 2024-10-02 RX ORDER — LOPERAMIDE HYDROCHLORIDE 2 MG/1
4 CAPSULE ORAL
Status: DISCONTINUED | OUTPATIENT
Start: 2024-10-02 | End: 2024-10-02

## 2024-10-02 RX ORDER — OXYCODONE HYDROCHLORIDE 5 MG/1
5 TABLET ORAL EVERY 4 HOURS PRN
Status: DISCONTINUED | OUTPATIENT
Start: 2024-10-02 | End: 2024-10-05 | Stop reason: HOSPADM

## 2024-10-02 RX ORDER — ONDANSETRON 2 MG/ML
4 INJECTION INTRAMUSCULAR; INTRAVENOUS EVERY 30 MIN PRN
Status: DISCONTINUED | OUTPATIENT
Start: 2024-10-02 | End: 2024-10-02 | Stop reason: HOSPADM

## 2024-10-02 RX ORDER — METOCLOPRAMIDE HYDROCHLORIDE 5 MG/ML
10 INJECTION INTRAMUSCULAR; INTRAVENOUS EVERY 6 HOURS PRN
Status: DISCONTINUED | OUTPATIENT
Start: 2024-10-02 | End: 2024-10-05 | Stop reason: HOSPADM

## 2024-10-02 RX ORDER — NALOXONE HYDROCHLORIDE 0.4 MG/ML
0.1 INJECTION, SOLUTION INTRAMUSCULAR; INTRAVENOUS; SUBCUTANEOUS
Status: DISCONTINUED | OUTPATIENT
Start: 2024-10-02 | End: 2024-10-02 | Stop reason: HOSPADM

## 2024-10-02 RX ORDER — BISACODYL 10 MG
10 SUPPOSITORY, RECTAL RECTAL DAILY PRN
Status: DISCONTINUED | OUTPATIENT
Start: 2024-10-04 | End: 2024-10-05 | Stop reason: HOSPADM

## 2024-10-02 RX ORDER — AMOXICILLIN 250 MG
1 CAPSULE ORAL 2 TIMES DAILY
Status: DISCONTINUED | OUTPATIENT
Start: 2024-10-02 | End: 2024-10-05 | Stop reason: HOSPADM

## 2024-10-02 RX ORDER — CEFAZOLIN SODIUM/WATER 2 G/20 ML
2 SYRINGE (ML) INTRAVENOUS
Status: COMPLETED | OUTPATIENT
Start: 2024-10-02 | End: 2024-10-02

## 2024-10-02 RX ORDER — MORPHINE SULFATE 1 MG/ML
INJECTION, SOLUTION EPIDURAL; INTRATHECAL; INTRAVENOUS
Status: COMPLETED | OUTPATIENT
Start: 2024-10-02 | End: 2024-10-02

## 2024-10-02 RX ORDER — HYDROMORPHONE HCL IN WATER/PF 6 MG/30 ML
0.2 PATIENT CONTROLLED ANALGESIA SYRINGE INTRAVENOUS EVERY 5 MIN PRN
Status: DISCONTINUED | OUTPATIENT
Start: 2024-10-02 | End: 2024-10-02 | Stop reason: HOSPADM

## 2024-10-02 RX ORDER — ONDANSETRON 4 MG/1
4 TABLET, ORALLY DISINTEGRATING ORAL EVERY 30 MIN PRN
Status: DISCONTINUED | OUTPATIENT
Start: 2024-10-02 | End: 2024-10-02 | Stop reason: HOSPADM

## 2024-10-02 RX ORDER — METOCLOPRAMIDE 10 MG/1
10 TABLET ORAL EVERY 6 HOURS PRN
Status: DISCONTINUED | OUTPATIENT
Start: 2024-10-02 | End: 2024-10-05 | Stop reason: HOSPADM

## 2024-10-02 RX ORDER — OXYTOCIN 10 [USP'U]/ML
10 INJECTION, SOLUTION INTRAMUSCULAR; INTRAVENOUS
Status: DISCONTINUED | OUTPATIENT
Start: 2024-10-02 | End: 2024-10-02

## 2024-10-02 RX ORDER — PROCHLORPERAZINE MALEATE 10 MG
10 TABLET ORAL EVERY 6 HOURS PRN
Status: DISCONTINUED | OUTPATIENT
Start: 2024-10-02 | End: 2024-10-05 | Stop reason: HOSPADM

## 2024-10-02 RX ORDER — NICOTINE POLACRILEX 4 MG
15-30 LOZENGE BUCCAL
Status: DISCONTINUED | OUTPATIENT
Start: 2024-10-02 | End: 2024-10-05 | Stop reason: HOSPADM

## 2024-10-02 RX ORDER — LIDOCAINE 40 MG/G
CREAM TOPICAL
Status: DISCONTINUED | OUTPATIENT
Start: 2024-10-02 | End: 2024-10-05 | Stop reason: HOSPADM

## 2024-10-02 RX ORDER — OXYTOCIN/0.9 % SODIUM CHLORIDE 30/500 ML
340 PLASTIC BAG, INJECTION (ML) INTRAVENOUS CONTINUOUS PRN
Status: DISCONTINUED | OUTPATIENT
Start: 2024-10-02 | End: 2024-10-02

## 2024-10-02 RX ORDER — IBUPROFEN 800 MG/1
800 TABLET, FILM COATED ORAL EVERY 6 HOURS
Status: DISCONTINUED | OUTPATIENT
Start: 2024-10-03 | End: 2024-10-05 | Stop reason: HOSPADM

## 2024-10-02 RX ORDER — OXYTOCIN/0.9 % SODIUM CHLORIDE 30/500 ML
100-340 PLASTIC BAG, INJECTION (ML) INTRAVENOUS CONTINUOUS PRN
Status: DISCONTINUED | OUTPATIENT
Start: 2024-10-02 | End: 2024-10-02

## 2024-10-02 RX ORDER — KETOROLAC TROMETHAMINE 30 MG/ML
30 INJECTION, SOLUTION INTRAMUSCULAR; INTRAVENOUS EVERY 6 HOURS
Status: COMPLETED | OUTPATIENT
Start: 2024-10-02 | End: 2024-10-03

## 2024-10-02 RX ORDER — SIMETHICONE 80 MG
80 TABLET,CHEWABLE ORAL 4 TIMES DAILY PRN
Status: DISCONTINUED | OUTPATIENT
Start: 2024-10-02 | End: 2024-10-05 | Stop reason: HOSPADM

## 2024-10-02 RX ORDER — CEFAZOLIN SODIUM/WATER 2 G/20 ML
2 SYRINGE (ML) INTRAVENOUS SEE ADMIN INSTRUCTIONS
Status: DISCONTINUED | OUTPATIENT
Start: 2024-10-02 | End: 2024-10-02

## 2024-10-02 RX ORDER — OXYTOCIN/0.9 % SODIUM CHLORIDE 30/500 ML
PLASTIC BAG, INJECTION (ML) INTRAVENOUS PRN
Status: DISCONTINUED | OUTPATIENT
Start: 2024-10-02 | End: 2024-10-02

## 2024-10-02 RX ORDER — TRANEXAMIC ACID 10 MG/ML
1 INJECTION, SOLUTION INTRAVENOUS EVERY 30 MIN PRN
Status: DISCONTINUED | OUTPATIENT
Start: 2024-10-02 | End: 2024-10-05 | Stop reason: HOSPADM

## 2024-10-02 RX ORDER — MISOPROSTOL 200 UG/1
800 TABLET ORAL
Status: DISCONTINUED | OUTPATIENT
Start: 2024-10-02 | End: 2024-10-02

## 2024-10-02 RX ORDER — BUPIVACAINE HYDROCHLORIDE 7.5 MG/ML
INJECTION, SOLUTION INTRASPINAL
Status: COMPLETED | OUTPATIENT
Start: 2024-10-02 | End: 2024-10-02

## 2024-10-02 RX ORDER — FUROSEMIDE 10 MG/ML
20 INJECTION INTRAMUSCULAR; INTRAVENOUS EVERY 12 HOURS
Status: DISCONTINUED | OUTPATIENT
Start: 2024-10-02 | End: 2024-10-03

## 2024-10-02 RX ORDER — ACETAMINOPHEN 325 MG/1
975 TABLET ORAL ONCE
Status: COMPLETED | OUTPATIENT
Start: 2024-10-02 | End: 2024-10-02

## 2024-10-02 RX ORDER — OXYTOCIN 10 [USP'U]/ML
10 INJECTION, SOLUTION INTRAMUSCULAR; INTRAVENOUS
Status: DISCONTINUED | OUTPATIENT
Start: 2024-10-02 | End: 2024-10-05 | Stop reason: HOSPADM

## 2024-10-02 RX ORDER — ONDANSETRON 2 MG/ML
4 INJECTION INTRAMUSCULAR; INTRAVENOUS EVERY 6 HOURS PRN
Status: DISCONTINUED | OUTPATIENT
Start: 2024-10-02 | End: 2024-10-05 | Stop reason: HOSPADM

## 2024-10-02 RX ORDER — SODIUM CHLORIDE, SODIUM LACTATE, POTASSIUM CHLORIDE, CALCIUM CHLORIDE 600; 310; 30; 20 MG/100ML; MG/100ML; MG/100ML; MG/100ML
INJECTION, SOLUTION INTRAVENOUS CONTINUOUS
Status: DISCONTINUED | OUTPATIENT
Start: 2024-10-02 | End: 2024-10-02 | Stop reason: HOSPADM

## 2024-10-02 RX ORDER — LOPERAMIDE HYDROCHLORIDE 2 MG/1
4 CAPSULE ORAL
Status: DISCONTINUED | OUTPATIENT
Start: 2024-10-02 | End: 2024-10-05 | Stop reason: HOSPADM

## 2024-10-02 RX ORDER — METHYLERGONOVINE MALEATE 0.2 MG/ML
200 INJECTION INTRAVENOUS
Status: DISCONTINUED | OUTPATIENT
Start: 2024-10-02 | End: 2024-10-02

## 2024-10-02 RX ORDER — HYDROCORTISONE 25 MG/G
CREAM TOPICAL 3 TIMES DAILY PRN
Status: DISCONTINUED | OUTPATIENT
Start: 2024-10-02 | End: 2024-10-05 | Stop reason: HOSPADM

## 2024-10-02 RX ORDER — OXYTOCIN/0.9 % SODIUM CHLORIDE 30/500 ML
340 PLASTIC BAG, INJECTION (ML) INTRAVENOUS CONTINUOUS PRN
Status: DISCONTINUED | OUTPATIENT
Start: 2024-10-02 | End: 2024-10-05 | Stop reason: HOSPADM

## 2024-10-02 RX ORDER — LIDOCAINE 40 MG/G
CREAM TOPICAL
Status: DISCONTINUED | OUTPATIENT
Start: 2024-10-02 | End: 2024-10-02

## 2024-10-02 RX ORDER — MISOPROSTOL 200 UG/1
400 TABLET ORAL
Status: DISCONTINUED | OUTPATIENT
Start: 2024-10-02 | End: 2024-10-02

## 2024-10-02 RX ORDER — FENTANYL CITRATE 50 UG/ML
50 INJECTION, SOLUTION INTRAMUSCULAR; INTRAVENOUS EVERY 5 MIN PRN
Status: DISCONTINUED | OUTPATIENT
Start: 2024-10-02 | End: 2024-10-02 | Stop reason: HOSPADM

## 2024-10-02 RX ORDER — PROPOFOL 10 MG/ML
INJECTION, EMULSION INTRAVENOUS PRN
Status: DISCONTINUED | OUTPATIENT
Start: 2024-10-02 | End: 2024-10-02

## 2024-10-02 RX ORDER — PRENATAL VIT/IRON FUM/FOLIC AC 27MG-0.8MG
1 TABLET ORAL DAILY
Status: DISCONTINUED | OUTPATIENT
Start: 2024-10-02 | End: 2024-10-05 | Stop reason: HOSPADM

## 2024-10-02 RX ORDER — METHYLERGONOVINE MALEATE 0.2 MG/ML
200 INJECTION INTRAVENOUS
Status: DISCONTINUED | OUTPATIENT
Start: 2024-10-02 | End: 2024-10-05 | Stop reason: HOSPADM

## 2024-10-02 RX ORDER — MODIFIED LANOLIN
OINTMENT (GRAM) TOPICAL
Status: DISCONTINUED | OUTPATIENT
Start: 2024-10-02 | End: 2024-10-05 | Stop reason: HOSPADM

## 2024-10-02 RX ORDER — DEXAMETHASONE SODIUM PHOSPHATE 4 MG/ML
4 INJECTION, SOLUTION INTRA-ARTICULAR; INTRALESIONAL; INTRAMUSCULAR; INTRAVENOUS; SOFT TISSUE
Status: DISCONTINUED | OUTPATIENT
Start: 2024-10-02 | End: 2024-10-02 | Stop reason: HOSPADM

## 2024-10-02 RX ORDER — LOPERAMIDE HYDROCHLORIDE 2 MG/1
2 CAPSULE ORAL
Status: DISCONTINUED | OUTPATIENT
Start: 2024-10-02 | End: 2024-10-02

## 2024-10-02 RX ORDER — ONDANSETRON 2 MG/ML
INJECTION INTRAMUSCULAR; INTRAVENOUS PRN
Status: DISCONTINUED | OUTPATIENT
Start: 2024-10-02 | End: 2024-10-02

## 2024-10-02 RX ORDER — MISOPROSTOL 200 UG/1
800 TABLET ORAL
Status: DISCONTINUED | OUTPATIENT
Start: 2024-10-02 | End: 2024-10-05 | Stop reason: HOSPADM

## 2024-10-02 RX ORDER — FENTANYL CITRATE 50 UG/ML
25 INJECTION, SOLUTION INTRAMUSCULAR; INTRAVENOUS EVERY 5 MIN PRN
Status: DISCONTINUED | OUTPATIENT
Start: 2024-10-02 | End: 2024-10-02 | Stop reason: HOSPADM

## 2024-10-02 RX ORDER — ACETAMINOPHEN 325 MG/1
975 TABLET ORAL EVERY 6 HOURS
Status: DISCONTINUED | OUTPATIENT
Start: 2024-10-02 | End: 2024-10-05 | Stop reason: HOSPADM

## 2024-10-02 RX ORDER — HYDROMORPHONE HCL IN WATER/PF 6 MG/30 ML
0.4 PATIENT CONTROLLED ANALGESIA SYRINGE INTRAVENOUS EVERY 5 MIN PRN
Status: DISCONTINUED | OUTPATIENT
Start: 2024-10-02 | End: 2024-10-02 | Stop reason: HOSPADM

## 2024-10-02 RX ORDER — TRANEXAMIC ACID 10 MG/ML
1 INJECTION, SOLUTION INTRAVENOUS EVERY 30 MIN PRN
Status: DISCONTINUED | OUTPATIENT
Start: 2024-10-02 | End: 2024-10-02

## 2024-10-02 RX ORDER — DEXTROSE, SODIUM CHLORIDE, SODIUM LACTATE, POTASSIUM CHLORIDE, AND CALCIUM CHLORIDE 5; .6; .31; .03; .02 G/100ML; G/100ML; G/100ML; G/100ML; G/100ML
INJECTION, SOLUTION INTRAVENOUS CONTINUOUS
Status: DISCONTINUED | OUTPATIENT
Start: 2024-10-02 | End: 2024-10-05

## 2024-10-02 RX ORDER — SODIUM PHOSPHATE,MONO-DIBASIC 19G-7G/118
1 ENEMA (ML) RECTAL DAILY PRN
Status: DISCONTINUED | OUTPATIENT
Start: 2024-10-04 | End: 2024-10-05 | Stop reason: HOSPADM

## 2024-10-02 RX ORDER — DEXTROSE MONOHYDRATE 25 G/50ML
25-50 INJECTION, SOLUTION INTRAVENOUS
Status: DISCONTINUED | OUTPATIENT
Start: 2024-10-02 | End: 2024-10-05 | Stop reason: HOSPADM

## 2024-10-02 RX ORDER — CARBOPROST TROMETHAMINE 250 UG/ML
250 INJECTION, SOLUTION INTRAMUSCULAR
Status: DISCONTINUED | OUTPATIENT
Start: 2024-10-02 | End: 2024-10-05 | Stop reason: HOSPADM

## 2024-10-02 RX ORDER — DEXAMETHASONE SODIUM PHOSPHATE 4 MG/ML
INJECTION, SOLUTION INTRA-ARTICULAR; INTRALESIONAL; INTRAMUSCULAR; INTRAVENOUS; SOFT TISSUE PRN
Status: DISCONTINUED | OUTPATIENT
Start: 2024-10-02 | End: 2024-10-02

## 2024-10-02 RX ORDER — CITRIC ACID/SODIUM CITRATE 334-500MG
30 SOLUTION, ORAL ORAL
Status: COMPLETED | OUTPATIENT
Start: 2024-10-02 | End: 2024-10-02

## 2024-10-02 RX ORDER — ONDANSETRON 4 MG/1
4 TABLET, ORALLY DISINTEGRATING ORAL EVERY 6 HOURS PRN
Status: DISCONTINUED | OUTPATIENT
Start: 2024-10-02 | End: 2024-10-05 | Stop reason: HOSPADM

## 2024-10-02 RX ORDER — KETOROLAC TROMETHAMINE 30 MG/ML
INJECTION, SOLUTION INTRAMUSCULAR; INTRAVENOUS PRN
Status: DISCONTINUED | OUTPATIENT
Start: 2024-10-02 | End: 2024-10-02

## 2024-10-02 RX ORDER — AMOXICILLIN 250 MG
2 CAPSULE ORAL 2 TIMES DAILY
Status: DISCONTINUED | OUTPATIENT
Start: 2024-10-02 | End: 2024-10-05 | Stop reason: HOSPADM

## 2024-10-02 RX ORDER — SODIUM CHLORIDE, SODIUM LACTATE, POTASSIUM CHLORIDE, CALCIUM CHLORIDE 600; 310; 30; 20 MG/100ML; MG/100ML; MG/100ML; MG/100ML
INJECTION, SOLUTION INTRAVENOUS CONTINUOUS
Status: DISCONTINUED | OUTPATIENT
Start: 2024-10-02 | End: 2024-10-02

## 2024-10-02 RX ORDER — LOPERAMIDE HYDROCHLORIDE 2 MG/1
2 CAPSULE ORAL
Status: DISCONTINUED | OUTPATIENT
Start: 2024-10-02 | End: 2024-10-05 | Stop reason: HOSPADM

## 2024-10-02 RX ADMIN — PROPOFOL 20 MG: 10 INJECTION, EMULSION INTRAVENOUS at 07:53

## 2024-10-02 RX ADMIN — PHENYLEPHRINE HYDROCHLORIDE 0.4 MCG/KG/MIN: 10 INJECTION INTRAVENOUS at 07:28

## 2024-10-02 RX ADMIN — Medication 0.15 MG: at 07:26

## 2024-10-02 RX ADMIN — ACETAMINOPHEN 325MG 975 MG: 325 TABLET ORAL at 06:28

## 2024-10-02 RX ADMIN — KETOROLAC TROMETHAMINE 30 MG: 30 INJECTION, SOLUTION INTRAMUSCULAR at 14:30

## 2024-10-02 RX ADMIN — ONDANSETRON 4 MG: 2 INJECTION INTRAMUSCULAR; INTRAVENOUS at 11:23

## 2024-10-02 RX ADMIN — FUROSEMIDE 20 MG: 10 INJECTION, SOLUTION INTRAMUSCULAR; INTRAVENOUS at 23:58

## 2024-10-02 RX ADMIN — BUPIVACAINE HYDROCHLORIDE IN DEXTROSE 2 ML: 7.5 INJECTION, SOLUTION SUBARACHNOID at 07:26

## 2024-10-02 RX ADMIN — Medication 30 ML: at 07:49

## 2024-10-02 RX ADMIN — FUROSEMIDE 20 MG: 10 INJECTION, SOLUTION INTRAMUSCULAR; INTRAVENOUS at 12:41

## 2024-10-02 RX ADMIN — ACETAMINOPHEN 325MG 975 MG: 325 TABLET ORAL at 12:41

## 2024-10-02 RX ADMIN — Medication 2 G: at 07:19

## 2024-10-02 RX ADMIN — SODIUM CHLORIDE, POTASSIUM CHLORIDE, SODIUM LACTATE AND CALCIUM CHLORIDE: 600; 310; 30; 20 INJECTION, SOLUTION INTRAVENOUS at 08:09

## 2024-10-02 RX ADMIN — SODIUM CHLORIDE, POTASSIUM CHLORIDE, SODIUM LACTATE AND CALCIUM CHLORIDE 500 ML: 600; 310; 30; 20 INJECTION, SOLUTION INTRAVENOUS at 06:29

## 2024-10-02 RX ADMIN — PROPOFOL 15 MG: 10 INJECTION, EMULSION INTRAVENOUS at 07:38

## 2024-10-02 RX ADMIN — Medication 100 ML/HR: at 10:07

## 2024-10-02 RX ADMIN — ONDANSETRON 4 MG: 2 INJECTION INTRAMUSCULAR; INTRAVENOUS at 07:32

## 2024-10-02 RX ADMIN — ACETAMINOPHEN 325MG 975 MG: 325 TABLET ORAL at 19:29

## 2024-10-02 RX ADMIN — SODIUM CITRATE AND CITRIC ACID MONOHYDRATE 30 ML: 500; 334 SOLUTION ORAL at 06:28

## 2024-10-02 RX ADMIN — Medication 170 ML: at 08:22

## 2024-10-02 RX ADMIN — SENNOSIDES AND DOCUSATE SODIUM 1 TABLET: 8.6; 5 TABLET ORAL at 21:05

## 2024-10-02 RX ADMIN — KETOROLAC TROMETHAMINE 30 MG: 30 INJECTION, SOLUTION INTRAMUSCULAR at 21:05

## 2024-10-02 RX ADMIN — DEXAMETHASONE SODIUM PHOSPHATE 4 MG: 4 INJECTION, SOLUTION INTRA-ARTICULAR; INTRALESIONAL; INTRAMUSCULAR; INTRAVENOUS; SOFT TISSUE at 07:32

## 2024-10-02 RX ADMIN — KETOROLAC TROMETHAMINE 30 MG: 30 INJECTION, SOLUTION INTRAMUSCULAR at 08:27

## 2024-10-02 RX ADMIN — SODIUM CHLORIDE, POTASSIUM CHLORIDE, SODIUM LACTATE AND CALCIUM CHLORIDE: 600; 310; 30; 20 INJECTION, SOLUTION INTRAVENOUS at 07:19

## 2024-10-02 ASSESSMENT — ACTIVITIES OF DAILY LIVING (ADL)
ADLS_ACUITY_SCORE: 20
ADLS_ACUITY_SCORE: 24
ADLS_ACUITY_SCORE: 20
ADLS_ACUITY_SCORE: 20
ADLS_ACUITY_SCORE: 24
ADLS_ACUITY_SCORE: 25
ADLS_ACUITY_SCORE: 20
ADLS_ACUITY_SCORE: 25
ADLS_ACUITY_SCORE: 20
ADLS_ACUITY_SCORE: 25
ADLS_ACUITY_SCORE: 25
ADLS_ACUITY_SCORE: 24
ADLS_ACUITY_SCORE: 24
ADLS_ACUITY_SCORE: 20
ADLS_ACUITY_SCORE: 24
ADLS_ACUITY_SCORE: 20
ADLS_ACUITY_SCORE: 24
ADLS_ACUITY_SCORE: 25

## 2024-10-02 NOTE — ANESTHESIA POSTPROCEDURE EVALUATION
Patient: Kate Luuenhaupt    Procedure: Procedure(s):  PRIMARY  SECTION       Anesthesia Type:  Spinal    Note:  Disposition: Inpatient   Postop Pain Control: Uneventful            Sign Out: Well controlled pain   PONV: No   Neuro/Psych: Uneventful            Sign Out: Acceptable/Baseline neuro status   Airway/Respiratory: Uneventful            Sign Out: Acceptable/Baseline resp. status   CV/Hemodynamics: Uneventful            Sign Out: Acceptable CV status; No obvious hypovolemia; No obvious fluid overload   Other NRE: NONE   DID A NON-ROUTINE EVENT OCCUR? No           Last vitals:  Vitals Value Taken Time   /81 10/02/24 0850   Temp     Pulse     Resp     SpO2 100 % 10/02/24 0849   Vitals shown include unfiled device data.    Electronically Signed By: Juwan Benoit MD  2024  8:53 AM

## 2024-10-02 NOTE — PLAN OF CARE
"Goal Outcome Evaluation:      Plan of Care Reviewed With: patient, spouse    Overall Patient Progress: improvingOverall Patient Progress: improving     Data: VSS. Postpartum checks within normal limits - see flow record. Patient is eating and drinking normally, up out of bed x1, walked to bath room and back with assist of 1. Acosta in place and draining properly. Acosta is supposed to stay in until second dose of lasix is given. Patient is breastfeeding and supplement with Donor milk by  bottle due to blood sugars every 2-3 hours. Incision WNL, using Ice pack for discomfort.   Action: Patient medicated with Tylenol and Toradol during the shift for pain. See MAR. Patient education done, see flow record.  Response: Patient is bonding well with baby. father at bedside, supportive.  Plan: Continue current plan of care. Anticipate discharge in 2-3 days.       Problem: Adult Inpatient Plan of Care  Goal: Plan of Care Review  Description: The Plan of Care Review/Shift note should be completed every shift.  The Outcome Evaluation is a brief statement about your assessment that the patient is improving, declining, or no change.  This information will be displayed automatically on your shift  note.  Outcome: Progressing  Flowsheets (Taken 10/2/2024 2926)  Plan of Care Reviewed With:   patient   spouse  Overall Patient Progress: improving  Goal: Patient-Specific Goal (Individualized)  Description: You can add care plan individualizations to a care plan. Examples of Individualization might be:  \"Parent requests to be called daily at 9am for status\", \"I have a hard time hearing out of my right ear\", or \"Do not touch me to wake me up as it startles  me\".  Outcome: Progressing  Goal: Absence of Hospital-Acquired Illness or Injury  Outcome: Progressing  Intervention: Prevent Skin Injury  Recent Flowsheet Documentation  Taken 10/2/2024 5298 by Wnedy Kilpatrick RN  Body Position: position changed independently  Intervention: Prevent " and Manage VTE (Venous Thromboembolism) Risk  Recent Flowsheet Documentation  Taken 10/2/2024 1553 by Wendy Kilpatrick RN  VTE Prevention/Management: SCDs on (sequential compression devices)  Intervention: Prevent Infection  Recent Flowsheet Documentation  Taken 10/2/2024 1553 by Wendy Kilpatrick RN  Infection Prevention:   rest/sleep promoted   hand hygiene promoted  Goal: Optimal Comfort and Wellbeing  Outcome: Progressing  Intervention: Provide Person-Centered Care  Recent Flowsheet Documentation  Taken 10/2/2024 1553 by Wendy Kilpatrick RN  Trust Relationship/Rapport:   choices provided   care explained   questions encouraged   questions answered  Goal: Readiness for Transition of Care  Outcome: Progressing     Problem: Postpartum ( Delivery)  Goal: Successful Parent Role Transition  Outcome: Progressing  Goal: Hemostasis  Outcome: Progressing  Goal: Effective Bowel Elimination  Outcome: Progressing  Goal: Fluid and Electrolyte Balance  Outcome: Progressing  Goal: Absence of Infection Signs and Symptoms  Outcome: Progressing  Goal: Anesthesia/Sedation Recovery  Outcome: Progressing  Goal: Optimal Pain Control and Function  Outcome: Progressing  Goal: Nausea and Vomiting Relief  Outcome: Progressing  Goal: Effective Urinary Elimination  Outcome: Progressing  Goal: Effective Oxygenation and Ventilation  Outcome: Progressing  Intervention: Optimize Oxygenation and Ventilation  Recent Flowsheet Documentation  Taken 10/2/2024 1553 by Wendy Kilpatrick RN  Head of Bed (HOB) Positioning: HOB at 60-90 degrees

## 2024-10-02 NOTE — PROVIDER NOTIFICATION
10/02/24 0700   Provider Notification   Provider Name/Title CHILO Benoit   Method of Notification At Bedside   Request Evaluate in Person     MDA at bedside to discuss procedure with patient. Questions encouraged and answered.

## 2024-10-02 NOTE — PROVIDER NOTIFICATION
10/02/24 0840   Provider Notification   Provider Name/Title Dr. Muñoz   Method of Notification At Bedside     Discussed with MD that first check since OR had moderate sized clot x1. Pitocin bag only has about 100ml left. Asking if MD would like second bag of pitocin.     MD orders second bag of pitocin.

## 2024-10-02 NOTE — PROVIDER NOTIFICATION
10/02/24 0650   Provider Notification   Provider Name/Title Dr. Muñoz   Method of Notification At Bedside   Request Evaluate in Person     MD at bedside to discuss procedure with patient. Patient and spouse educated and questions answered. Consents to be signed by provider and patient.

## 2024-10-02 NOTE — PROVIDER NOTIFICATION
10/02/24 1515   Provider Notification   Provider Name/Title Dr Muñoz   Method of Notification Electronic Page  (SocialBrowse Message)   Request Evaluate-Remote   Notification Reason Other     Dr uMñoz updated that we have not seen the results from the CMP yet and wondering if he wanted to order another draw. At this time that is not necessary as she has labs ordered for tomorrow.

## 2024-10-02 NOTE — PLAN OF CARE
Data: [facility]: 10/2/2024  5:33 AM.  Patient admitted for elective primary . Patient is a .  Prenatal record reviewed. Pregnancy  has been complicated by GDMA2, LEEP (), and BMI 35.  Gestational Age 39w3d. VSS. Fetal movement active. Patient denies uterine contractions, leaking of vaginal fluid/rupture of membranes, vaginal bleeding, abdominal pain, pelvic pressure, nausea, vomiting, headache, visual disturbances, and epigastric or URQ pain. Support person is present.   Action: Verbal consent for EFM.  Admission assessment completed. Bill of rights reviewed.  Response: Patient verbalized agreement with plan. Will contact Dr. Daniel Muñoz with update and further orders.
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
## 2024-10-02 NOTE — ANESTHESIA PROCEDURE NOTES
"Intrathecal injection Procedure Note    Pre-Procedure   Staff -        Anesthesiologist:  Juwan Benoit MD       Performed By: anesthesiologist       Referred By: Toni       Location: OB       Pre-Anesthestic Checklist: patient identified, IV checked, risks and benefits discussed, informed consent, monitors and equipment checked, pre-op evaluation, at physician/surgeon's request and post-op pain management  Timeout:       Correct Patient: Yes        Correct Procedure: Yes        Correct Site: Yes        Correct Position: Yes   Procedure Documentation  Procedure: intrathecal injection       Patient Position: sitting       Skin prep: Betadine       Insertion Site: L2-3. (midline approach).       Needle Gauge: 24.        Needle Length (Inches): 3.5        Spinal Needle Type: Pencan       Introducer used       # of attempts: 2 and  # of redirects:  2    Assessment/Narrative         Paresthesias: No.       CSF fluid: blood tinged.    Medication(s) Administered   0.75% Hyperbaric Bupivacaine (Intrathecal) - Intrathecal   2 mL - 10/2/2024 7:26:00 AM  Morphine PF 1 mg/mL (Intrathecal) - Intrathecal   0.15 mg - 10/2/2024 7:26:00 AM    FOR [facility] ([facility] ONLY:   Pain Team Contact information: please page the Pain Team Via [id number]. Search \"Pain\". During daytime hours, please page the attending first. At night please page the resident first.      "

## 2024-10-02 NOTE — PLAN OF CARE
Data: Kate Gallo transferred to 428 via cart at 1030. Baby transferred via parent's arms.  Action: Receiving unit notified of transfer: Yes. Patient and family notified of room change. Report given to ANGELLA Gusman at 1050. Belongings sent to receiving unit. Accompanied by Registered Nurse. Oriented patient to surroundings. Call light within reach. ID bands double-checked with receiving RN.  Response: Patient tolerated transfer and is stable. Pt has had lower extremity swelling since mid-pregnancy. MD ordered lasix (see MAR) and would like khan in until after doses. SBPs have been 130s in clinic and here.     Patients mobililty level scored using the bedside mobility assistance tool (BMAT). Patient is at a mobility level test number: 1. Mobility equipment used: hovermat. Required assist of 2 staff members. Further use of BMAT scoring required.

## 2024-10-02 NOTE — PROVIDER NOTIFICATION
10/02/24 1148   Provider Notification   Provider Name/Title Dr Muñoz   Method of Notification Electronic Page  (Vocera messaging)   Request Evaluate-Remote   Notification Reason Vital Signs Change     Dr Muñoz updated with patient's last BP readings and current status. He will order [facility] labs to be drawn.   Single

## 2024-10-02 NOTE — OP NOTE
Section Operative Note  [facility]      Indications: 39w3d gestation with GDMA2, unfavorable cervix with unengaged with concern for CPD.  Patient opts for elective primary C/S  Gestational age: 39w3d    Pre-operative Diagnosis: Encounter for supervision of normal first pregnancy in third trimester [Z34.03]  GDM, class A2 [O24.419]  History of loop electrosurgical excision procedure (LEEP) of cervix affecting pregnancy in second trimester [O34.42, Z98.890]  Edema in pregnancy, antepartum [O12.00]  Post-operative Diagnosis: Same    Surgeon: Daniel Muñoz MD   Assistant(s): None   Anesthesia: Spinal anesthesia     Procedure Details:  The risks, benefits, complications, treatment options, and expected outcomes were discussed with the patient.  The patient concurred with the proposed plan, giving informed consent.  The site of surgery properly noted/marked. The patient was taken to Operating Room # 1, identified as Kate Gallo and the procedure verified as  Delivery. A Time Out was held and the above information confirmed.    After induction/confirmation of anesthesia, the patient was draped and prepped in the usual sterile manner. A Pfannenstiel incision was made and carried down through the subcutaneous tissue to the fascia. Fascial incision was made and extended transversely. The fascia was  from the underlying rectus tissue superiorly and inferiorly. The peritoneum was identified and entered. Peritoneal incision was extended longitudinally. The utero-vesical peritoneal reflection was incised transversely and the bladder flap was bluntly and sharply freed from the lower uterine segment. An Stef retractor was placed. A low transverse uterine incision was made. Amniotic cavity entered.  The amniotic fluid was noted to be clear The infant was delivered from VERTEX presentation and was 8 lbs, 12 oz with Apgar scores of 9 at one minute and 9 at five minutes. After  the umbilical cord was clamped and cut, cord blood was obtained for evaluation. The placenta was removed intact and appeared normal. The uterine outline, tubes and ovaries appeared normal. The uterine incision was closed with running locked sutures of 0 Vicryl. 2 layers. Hemostasis was observed. Lavage was carried out until clear. The fascia was then reapproximated with running sutures of 0 Vicryl. The subcutaneous was approximated with 3-0 gut.The skin was reapproximated with sutures 4-0 Monocryl.    Instrument, sponge, and needle counts were correct prior the abdominal closure and at the conclusion of the case.     Estimated Blood Loss: 318cc  Total IV Fluids: (See anesthesia record)  Drains: Acosta catheter               Complications:  None           Disposition: To recovery    Condition: Stable      Daniel Muñoz MD  10/2/2024 8:34 AM

## 2024-10-02 NOTE — LACTATION NOTE
This note was copied from a baby's chart.  Lactation visit; This is Beverly's first baby. Started supplementing with DM per parental preference and d/t being on hypoglycemic protocol. Infant LGA and maternal GDMA2. Initial blood sugars WNL.   Assisted with bringing infant STS to left breast- left nipple flat without stimulation but does jcarlos with stimulation- suggested football hold. Hand expression done prior to latch and drops noted. After a couple of re latches infant was able to grasp and sustain latch with rhythmic suck pattern- multiple swallows heard. Infant active for 10-12 minutes and then fell asleep. Discussed pumping d/t supplementation and Beverly would like to start.   Assisted with pump set up, reviewed settings, and care/cleaning of parts. Also reviewed paced bottle feeding with FOB to help transition between breast and bottle.   Education provided on benefits of STS and hand expression as well as expected feeding behaviors.   All questions answered.

## 2024-10-02 NOTE — ANESTHESIA CARE TRANSFER NOTE
Patient: Kate Gallo    Procedure: Procedure(s):  PRIMARY  SECTION       Diagnosis: Encounter for supervision of normal first pregnancy in third trimester [Z34.03]  GDM, class A2 [O24.419]  History of loop electrosurgical excision procedure (LEEP) of cervix affecting pregnancy in second trimester [O34.42, Z98.890]  Edema in pregnancy, antepartum [O12.00]  Diagnosis Additional Information: No value filed.    Anesthesia Type:   Spinal     Note:    Oropharynx: oropharynx clear of all foreign objects  Level of Consciousness: awake  Oxygen Supplementation: room air    Independent Airway: airway patency satisfactory and stable  Dentition: dentition unchanged  Vital Signs Stable: post-procedure vital signs reviewed and stable  Report to RN Given: handoff report given  Patient transferred to: Labor and Delivery    Handoff Report: Identifed the Patient, Identified the Reponsible Provider, Reviewed the pertinent medical history, Discussed the surgical course, Reviewed Intra-OP anesthesia mangement and issues during anesthesia, Set expectations for post-procedure period and Allowed opportunity for questions and acknowledgement of understanding      Vitals:  Vitals Value Taken Time   /67 10/02/24 0830   Temp     Pulse     Resp     SpO2 93 % 10/02/24 0829   Vitals shown include unfiled device data.    Electronically Signed By: YANET Eric CRNA  2024  8:33 AM

## 2024-10-02 NOTE — PLAN OF CARE
"BPs continue to be elevated. Dr Muñoz aware, waiting on PIH results. Lasix given, urine output increasing. Nausea resolved and now eating and drinking okay. Pain well controlled with Tylenol, Toradol and ice pack. Breastfeeding with RN and lactation assistance.  and parents are at bedside.       Problem: Adult Inpatient Plan of Care  Goal: Plan of Care Review  Description: The Plan of Care Review/Shift note should be completed every shift.  The Outcome Evaluation is a brief statement about your assessment that the patient is improving, declining, or no change.  This information will be displayed automatically on your shift  note.  10/2/2024 1622 by Uzma Rasheed RN  Outcome: Progressing  10/2/2024 1622 by Uzma Rasheed RN  Outcome: Progressing  Flowsheets (Taken 10/2/2024 1622)  Plan of Care Reviewed With:   patient   spouse  Goal: Patient-Specific Goal (Individualized)  Description: You can add care plan individualizations to a care plan. Examples of Individualization might be:  \"Parent requests to be called daily at 9am for status\", \"I have a hard time hearing out of my right ear\", or \"Do not touch me to wake me up as it startles  me\".  Outcome: Progressing  Goal: Absence of Hospital-Acquired Illness or Injury  Outcome: Progressing  Goal: Optimal Comfort and Wellbeing  Outcome: Progressing  Goal: Readiness for Transition of Care  Outcome: Progressing     Problem: Postpartum ( Delivery)  Goal: Successful Parent Role Transition  Outcome: Progressing  Goal: Hemostasis  Outcome: Progressing  Goal: Effective Bowel Elimination  Outcome: Progressing  Goal: Fluid and Electrolyte Balance  Outcome: Progressing  Goal: Absence of Infection Signs and Symptoms  Outcome: Progressing  Goal: Anesthesia/Sedation Recovery  Outcome: Progressing  Goal: Optimal Pain Control and Function  Outcome: Progressing  Goal: Nausea and Vomiting Relief  Outcome: Progressing  Goal: Effective Urinary Elimination  Outcome: " Progressing  Goal: Effective Oxygenation and Ventilation  Outcome: Progressing   Goal Outcome Evaluation:      Plan of Care Reviewed With: patient, spouse

## 2024-10-03 LAB
ALBUMIN SERPL BCG-MCNC: 2.7 G/DL (ref 3.5–5.2)
ALBUMIN SERPL BCG-MCNC: 3.1 G/DL (ref 3.5–5.2)
ALP SERPL-CCNC: 160 U/L (ref 40–150)
ALP SERPL-CCNC: 172 U/L (ref 40–150)
ALT SERPL W P-5'-P-CCNC: 18 U/L (ref 0–50)
ALT SERPL W P-5'-P-CCNC: 18 U/L (ref 0–50)
ANION GAP SERPL CALCULATED.3IONS-SCNC: 11 MMOL/L (ref 7–15)
ANION GAP SERPL CALCULATED.3IONS-SCNC: 11 MMOL/L (ref 7–15)
AST SERPL W P-5'-P-CCNC: 29 U/L (ref 0–45)
AST SERPL W P-5'-P-CCNC: 33 U/L (ref 0–45)
BILIRUB SERPL-MCNC: <0.2 MG/DL
BILIRUB SERPL-MCNC: <0.2 MG/DL
BUN SERPL-MCNC: 16.6 MG/DL (ref 6–20)
BUN SERPL-MCNC: 17.9 MG/DL (ref 6–20)
CALCIUM SERPL-MCNC: 8.1 MG/DL (ref 8.8–10.4)
CALCIUM SERPL-MCNC: 9.3 MG/DL (ref 8.8–10.4)
CHLORIDE SERPL-SCNC: 101 MMOL/L (ref 98–107)
CHLORIDE SERPL-SCNC: 103 MMOL/L (ref 98–107)
CREAT SERPL-MCNC: 0.86 MG/DL (ref 0.51–0.95)
CREAT SERPL-MCNC: 0.87 MG/DL (ref 0.51–0.95)
EGFRCR SERPLBLD CKD-EPI 2021: 90 ML/MIN/1.73M2
EGFRCR SERPLBLD CKD-EPI 2021: >90 ML/MIN/1.73M2
ERYTHROCYTE [DISTWIDTH] IN BLOOD BY AUTOMATED COUNT: 14.3 % (ref 10–15)
GLUCOSE SERPL-MCNC: 74 MG/DL (ref 70–99)
GLUCOSE SERPL-MCNC: 74 MG/DL (ref 70–99)
GLUCOSE SERPL-MCNC: 91 MG/DL (ref 70–99)
HCO3 SERPL-SCNC: 22 MMOL/L (ref 22–29)
HCO3 SERPL-SCNC: 22 MMOL/L (ref 22–29)
HCT VFR BLD AUTO: 30.4 % (ref 35–47)
HGB BLD-MCNC: 9.6 G/DL (ref 11.7–15.7)
HGB BLD-MCNC: 9.8 G/DL (ref 11.7–15.7)
MCH RBC QN AUTO: 29.4 PG (ref 26.5–33)
MCHC RBC AUTO-ENTMCNC: 32.2 G/DL (ref 31.5–36.5)
MCV RBC AUTO: 91 FL (ref 78–100)
PLATELET # BLD AUTO: 216 10E3/UL (ref 150–450)
POTASSIUM SERPL-SCNC: 4.4 MMOL/L (ref 3.4–5.3)
POTASSIUM SERPL-SCNC: 4.4 MMOL/L (ref 3.4–5.3)
PROT SERPL-MCNC: 5.3 G/DL (ref 6.4–8.3)
PROT SERPL-MCNC: 5.9 G/DL (ref 6.4–8.3)
RBC # BLD AUTO: 3.33 10E6/UL (ref 3.8–5.2)
SODIUM SERPL-SCNC: 134 MMOL/L (ref 135–145)
SODIUM SERPL-SCNC: 136 MMOL/L (ref 135–145)
WBC # BLD AUTO: 12.1 10E3/UL (ref 4–11)

## 2024-10-03 PROCEDURE — 250N000011 HC RX IP 250 OP 636: Performed by: OBSTETRICS & GYNECOLOGY

## 2024-10-03 PROCEDURE — 85018 HEMOGLOBIN: CPT | Performed by: OBSTETRICS & GYNECOLOGY

## 2024-10-03 PROCEDURE — 250N000013 HC RX MED GY IP 250 OP 250 PS 637: Performed by: OBSTETRICS & GYNECOLOGY

## 2024-10-03 PROCEDURE — 82947 ASSAY GLUCOSE BLOOD QUANT: CPT | Performed by: OBSTETRICS & GYNECOLOGY

## 2024-10-03 PROCEDURE — 120N000001 HC R&B MED SURG/OB

## 2024-10-03 PROCEDURE — 36415 COLL VENOUS BLD VENIPUNCTURE: CPT | Performed by: OBSTETRICS & GYNECOLOGY

## 2024-10-03 PROCEDURE — 85014 HEMATOCRIT: CPT | Performed by: OBSTETRICS & GYNECOLOGY

## 2024-10-03 PROCEDURE — 84155 ASSAY OF PROTEIN SERUM: CPT | Performed by: OBSTETRICS & GYNECOLOGY

## 2024-10-03 RX ORDER — LABETALOL 200 MG/1
200 TABLET, FILM COATED ORAL EVERY 12 HOURS SCHEDULED
Status: DISCONTINUED | OUTPATIENT
Start: 2024-10-03 | End: 2024-10-04

## 2024-10-03 RX ADMIN — ACETAMINOPHEN 325MG 975 MG: 325 TABLET ORAL at 10:37

## 2024-10-03 RX ADMIN — ACETAMINOPHEN 325MG 975 MG: 325 TABLET ORAL at 01:36

## 2024-10-03 RX ADMIN — SENNOSIDES AND DOCUSATE SODIUM 1 TABLET: 8.6; 5 TABLET ORAL at 21:07

## 2024-10-03 RX ADMIN — LABETALOL HYDROCHLORIDE 200 MG: 200 TABLET, FILM COATED ORAL at 21:07

## 2024-10-03 RX ADMIN — IBUPROFEN 800 MG: 800 TABLET, FILM COATED ORAL at 17:32

## 2024-10-03 RX ADMIN — ACETAMINOPHEN 325MG 975 MG: 325 TABLET ORAL at 17:32

## 2024-10-03 RX ADMIN — IBUPROFEN 800 MG: 800 TABLET, FILM COATED ORAL at 10:38

## 2024-10-03 RX ADMIN — SENNOSIDES AND DOCUSATE SODIUM 2 TABLET: 8.6; 5 TABLET ORAL at 10:38

## 2024-10-03 RX ADMIN — KETOROLAC TROMETHAMINE 30 MG: 30 INJECTION, SOLUTION INTRAMUSCULAR at 02:58

## 2024-10-03 RX ADMIN — PRENATAL VITAMINS-IRON FUMARATE 27 MG IRON-FOLIC ACID 0.8 MG TABLET 1 TABLET: at 10:38

## 2024-10-03 ASSESSMENT — ACTIVITIES OF DAILY LIVING (ADL)
ADLS_ACUITY_SCORE: 20

## 2024-10-03 NOTE — PROVIDER NOTIFICATION
10/03/24 0949   Provider Notification   Provider Name/Title Dr SHOAIB Rick   Method of Notification In Department   Notification Reason Other     VORB to discontinue Lasix now.

## 2024-10-03 NOTE — PLAN OF CARE
"Pt Bps elevated, other VSS.  Postpartum checks WDL. C/s incision is CDI. Is bonding well with infant. Tolerating regular diet and able to ambulate independently. Acosta removed, due to void by 1030.  Pt utilizing tylenol and toradol for pain management. Breastfeeding, pumping, and supplementing with donor milk every 2-3 hours.     Problem: Adult Inpatient Plan of Care  Goal: Plan of Care Review  Description: The Plan of Care Review/Shift note should be completed every shift.  The Outcome Evaluation is a brief statement about your assessment that the patient is improving, declining, or no change.  This information will be displayed automatically on your shift  note.  Outcome: Progressing  Flowsheets (Taken 10/3/2024 0446)  Plan of Care Reviewed With: patient  Overall Patient Progress: improving  Goal: Patient-Specific Goal (Individualized)  Description: You can add care plan individualizations to a care plan. Examples of Individualization might be:  \"Parent requests to be called daily at 9am for status\", \"I have a hard time hearing out of my right ear\", or \"Do not touch me to wake me up as it startles  me\".  Outcome: Progressing  Goal: Absence of Hospital-Acquired Illness or Injury  Outcome: Progressing  Intervention: Prevent Skin Injury  Recent Flowsheet Documentation  Taken 10/3/2024 0130 by Kelley Campo RN  Body Position: position changed independently  Taken 10/2/2024 2335 by Kelley Campo RN  Body Position: position changed independently  Taken 10/2/2024 2100 by Kelley Campo RN  Body Position: position changed independently  Intervention: Prevent and Manage VTE (Venous Thromboembolism) Risk  Recent Flowsheet Documentation  Taken 10/3/2024 0130 by Kelley Campo RN  VTE Prevention/Management: SCDs on (sequential compression devices)  Taken 10/2/2024 2335 by Kelley Campo RN  VTE Prevention/Management: SCDs on (sequential compression devices)  Taken 10/2/2024 2100 by Kelley Campo RN  VTE " Prevention/Management: SCDs on (sequential compression devices)  Intervention: Prevent Infection  Recent Flowsheet Documentation  Taken 10/3/2024 0130 by Kelley Campo RN  Infection Prevention:   hand hygiene promoted   rest/sleep promoted  Taken 10/2/2024 2335 by Kelley Campo RN  Infection Prevention:   hand hygiene promoted   rest/sleep promoted  Taken 10/2/2024 2100 by Kelley Campo RN  Infection Prevention:   hand hygiene promoted   rest/sleep promoted  Goal: Optimal Comfort and Wellbeing  Outcome: Progressing  Intervention: Provide Person-Centered Care  Recent Flowsheet Documentation  Taken 10/3/2024 0130 by Kelley Campo RN  Trust Relationship/Rapport:   care explained   choices provided   questions encouraged   questions answered   thoughts/feelings acknowledged  Taken 10/2/2024 233 by Kelley Campo RN  Trust Relationship/Rapport:   care explained   choices provided   questions encouraged   questions answered   thoughts/feelings acknowledged  Taken 10/2/2024 2100 by Kelley Campo RN  Trust Relationship/Rapport:   care explained   choices provided   questions encouraged   questions answered   thoughts/feelings acknowledged  Goal: Readiness for Transition of Care  Outcome: Progressing     Problem: Postpartum ( Delivery)  Goal: Successful Parent Role Transition  Outcome: Progressing  Goal: Hemostasis  Outcome: Progressing  Goal: Effective Bowel Elimination  Outcome: Progressing  Goal: Fluid and Electrolyte Balance  Outcome: Progressing  Goal: Absence of Infection Signs and Symptoms  Outcome: Progressing  Goal: Anesthesia/Sedation Recovery  Outcome: Progressing  Goal: Optimal Pain Control and Function  Outcome: Progressing  Goal: Nausea and Vomiting Relief  Outcome: Progressing  Goal: Effective Urinary Elimination  Outcome: Progressing  Goal: Effective Oxygenation and Ventilation  Outcome: Progressing  Intervention: Optimize Oxygenation and Ventilation  Recent Flowsheet  Documentation  Taken 10/3/2024 0130 by Kelley Campo, RN  Head of Bed (HOB) Positioning: HOB at 60-90 degrees  Taken 10/2/2024 2335 by Kelley Campo, RN  Head of Bed (HOB) Positioning: HOB at 60-90 degrees  Taken 10/2/2024 2100 by Kelley Campo, RN  Head of Bed (HOB) Positioning: HOB at 60-90 degrees

## 2024-10-03 NOTE — PROGRESS NOTES
Public Health Nurse able to discuss [facility] programs with family. Hearing screen in progress. PHN to try again tomorrow.

## 2024-10-03 NOTE — LACTATION NOTE
Lactation Visit: Infant was due to eat when writer entered the room. Mother started feed on the L breast in the cradle hold was having difficulty getting infant positioned and kept bring infant over the breast. Suggested using the football hold. Mother declined stated she prefers the cradle or cross cradle instead . Infant was STS with a shield but was struggling to stay latched. Assisted mother with the side-lying position infant was able to latch and nursed for a short time in this position. Followed up with 15 ml of donor milk. Discussed the importance of getting and maintaining a good latch especially since mother's nipples are very sore already. Encouraged her to call for assistance with feedings. Mother is pumping following breast feeding.

## 2024-10-03 NOTE — PROGRESS NOTES
[facility]   Obstetrics Post-Op / Progress Note         Assessment and Plan:    Assessment:   Post-operative day #1  Low transverse primary  section  L&D complications: Following moderately elevated BP      Doing well.  Fairly distended  Normal healing wound.  No immediate surgical complications identified.  No excessive bleeding  Pain well-controlled.      Plan:   Ambulation encouraged  Watch BP and medicate if BPs consistently in high 140s            Interval History:     Doing well.  Pain is controlled.  No fevers.  No history of wound drainage, warmth or significant erythema.  Good appetite.  Denies chest pain, shortness of breath, nausea or vomiting.  Breastfeeding well.          Significant Problems:    None          Review of Systems:    The patient denies any chest pain, shortness of breath, excessive pain, fever, chills, purulent drainage from the wound, nausea or vomiting.          Medications:   All medications related to the patient's surgery have been reviewed          Physical Exam:   All vitals stable  Patient Vitals for the past 24 hrs:   BP Temp Temp src Pulse Resp SpO2   10/03/24 0616 (!) 140/87 -- -- 71 -- 100 %   10/03/24 0138 123/70 -- -- 84 -- 98 %   10/02/24 2100 (!) 143/75 97.9  F (36.6  C) Oral 72 16 --   10/02/24 1551 (!) 144/95 97.5  F (36.4  C) Oral 64 14 100 %   10/02/24 1315 137/78 -- -- -- -- --   10/02/24 1215 (!) 142/81 -- -- -- -- --   10/02/24 1145 (!) 143/88 -- -- -- 16 100 %   10/02/24 1130 (!) 157/85 -- -- -- -- 100 %   10/02/24 1115 (!) 164/87 -- -- -- 16 100 %   10/02/24 1059 (!) 158/90 -- -- -- 16 100 %   10/02/24 1028 137/86 -- -- -- -- --   10/02/24 1024 -- -- -- -- -- 100 %   10/02/24 1020 (!) 151/90 -- -- -- -- --   10/02/24 1019 -- -- -- -- -- 100 %   10/02/24 1005 133/85 -- -- -- -- --   10/02/24 1004 -- -- -- -- -- 100 %   10/02/24 0954 -- -- -- -- -- 100 %   10/02/24 0950 135/88 -- -- -- -- --   10/02/24 0944 -- -- -- -- -- 100 %   10/02/24 0939 -- --  -- -- -- 100 %   10/02/24 0935 139/85 -- -- -- -- --   10/02/24 0920 133/80 -- -- -- -- --   10/02/24 0919 -- -- -- -- -- 100 %   10/02/24 0909 -- -- -- -- -- 100 %   10/02/24 0904 125/79 -- -- -- -- 99 %   10/02/24 0844 -- -- -- -- -- 100 %   10/02/24 0839 -- -- -- -- -- 99 %   10/02/24 0834 -- -- -- -- -- 100 %   10/02/24 0832 -- 97.5  F (36.4  C) Oral -- 16 --   10/02/24 0830 113/67 -- -- -- -- --     Wound clean and dry with minimal or no drainage.  Surrounding skin with minimal erythema.          Data:   All laboratory data related to this surgery reviewed      Víctor Rick MD

## 2024-10-03 NOTE — DISCHARGE INSTRUCTIONS
Checking Your Blood Pressure at Home  During and after pregnancy  How do I measure my blood pressure?  It s important to take the readings at the same time each day, such as morning and evening. Take your blood pressure before taking any morning medications.  How to get the most accurate reading  30 minutes before checking your blood pressure, avoid the following:  Drinking caffeine  Drinking alcohol  Eating  Smoking  Exercising  5 minutes before checking your blood pressure:  Use the bathroom and urinate so you have an empty bladder.  Sit still in a chair for around 5 minutes. Stay calm and relaxed and do not talk if possible.  To check your blood pressure:     Sit up straight in a chair.  Place your feet on the floor. Don t cross your ankles or legs.  Rest your arm at the level of your heart on a table or desk or on the arm of a chair. Use the same arm every day.  Pull up your shirt sleeve. Don t take the measurement over clothes.  Wrap the blood pressure cuff around the upper part of your left arm, 1 inch (2.5 cm) above your elbow.  Fit the cuff snugly around your arm. You should be able to place only one finger between the cuff and your arm.  Position the cord so that it rests in the bend of your elbow.  Press the power button.  Sit quietly while the cuff inflates and deflates.  Read the digital reading on the monitor screen and write the numbers down (record them) in a notebook.  Wait 2-3 minutes, then repeat the steps, starting at step 1.  Which features do you need?  Arm cuff monitors give the most exact readings.  Wrist and finger blood pressure monitors are often less exact.  Pick a blood pressure monitor that has passed tests to show they measure exactly. Blood pressure cuffs for sale in the [place] that have passed tests are listed on the website www.validatebp.org.  Some monitors that have passed tests are:  Omron 3 Series Upper Arm Blood Pressure Monitor (Model CO3419)  Omron 5 Series Upper Arm Blood  "Pressure Monitor (Model RQ6515)  Omron 7 Series Upper Arm Blood Pressure Monitor (Model HEM-7320)  [employer] Upper Arm Blood Pressure Monitor with Talking Function (UA 1030T)  Don t use smartphone apps. There are many smartphone apps that claim to check your blood pressure using the pulse in your wrist or finger. These don t work. They haven t passed any tests. Don t give your clinic a blood pressure reading from a smartphone trang.  If you have a flexible spending account (FSA) or health savings account (HSA), you may wish to pay yourself back (reimburse) for the machine and cuff. A blood pressure monitor is an allowed over-the- counter (OTC) item to pay yourself back from these accounts.  Cuff size  The size of the arm cuff is a key feature. Make sure the cuff is the right size for your arm. If the cuff isn t the right size, readings will either be too high or low.  To know what size cuff to buy, measure the distance around your bicep (upper arm).  Use a flexible measuring tape or . Place the measuring tape shelter between your armpit and elbow. Measure the distance around your arm in inches.  You may need to buy a cuff apart from the machine to get the right size.  Cuff sizes and arm measurements  Small adult: 22 to 26 cm (8.7 to 10.2 inches)  Adult: 27 to 34 cm (10.6 to 13.4 inches)  Large adult: 35 to 44 cm (13.8 to 17.3 inches)  Adult thigh: 45 to 52 cm (17.7 to 20.5 inches)    Copyright statement\" content=\"For informational purposes only. Not to replace the advice of your health care provider. Photo: ID [id number]   [employer]. Text copyright   2023 [facility]. All rights reserved. Clinically reviewed by [facility]. [employer] 005190 - REV 03/23.  High Blood Pressure in Pregnancy: Care Instructions  Overview     High blood pressure (hypertension) means that the force of blood against your artery walls is too strong.  High blood pressure " problems during pregnancy include:  Chronic hypertension. This is high blood pressure that starts before pregnancy.  Gestational hypertension. This is high blood pressure that starts in the second or third trimester of pregnancy.  Preeclampsia. This is a problem that includes high blood pressure and signs of organ injury during pregnancy. In some cases, it leads to eclampsia. Eclampsia causes seizures.  High blood pressure during pregnancy can affect the amount of oxygen and nutrients your baby receives. This can affect how your baby grows. High blood pressure can also cause other serious problems for both you and your baby. For example, the placenta might separate too early from the wall of the uterus (placental abruption). This can cause serious bleeding or premature birth.  To prevent problems, you and your baby will be watched very closely. You will have to check your blood pressure often during and after the pregnancy.  If your blood pressure rises suddenly or is very high during pregnancy, your doctor may prescribe medicines. They can usually control blood pressure.  If your blood pressure affects your or your baby's health, you may need to be monitored in the hospital. You may get medicines. Or your doctor may need to deliver your baby early.  After your baby is born, your blood pressure will probably improve. But sometimes blood pressure problems continue after birth. If you had high blood pressure during pregnancy, you have more risk of having high blood pressure, heart disease, stroke, kidney disease, and diabetes later in life. Work with your doctor to make heart-healthy lifestyle choices. These include eating healthy foods, being active, staying at a healthy weight, and not smoking. Get the checkups you need. Your doctor may also want you to check your blood pressure at home.  Follow-up care is a key part of your treatment and safety. Be sure to make and go to all appointments, and call your doctor if  you are having problems. It's also a good idea to know your test results and keep a list of the medicines you take.  How can you care for yourself at home?  Take and write down your blood pressure at home if your doctor says to.  Take your medicines exactly as prescribed. Call your doctor if you think you are having a problem with your medicine.  Monitor yourself for symptoms of preeclampsia. Call your doctor if you have symptoms such as a severe headache, vision changes, or sudden swelling in your face and hands.  If you smoke, quit or cut back as much as you can. Smoking and vaping can be harmful to your baby. Talk to your doctor if you need help quitting.  Talk with your doctor about how much weight gain is healthy for you. Gaining too much weight while you're pregnant may be harmful.  Eat a variety of healthy foods. Include plenty of foods high in calcium, such as dairy products, almonds, and dark leafy greens.  If your doctor says it's okay, get regular exercise. Doing things like walking or swimming several times a week can be healthy for you and your baby.  Try to reduce stress and find time to relax. This is very important if you continue to work or have a busy schedule. It's also important if you have small children at home.  If your doctor recommends it, keep track of your baby's movement. A common method is to note the length of time it takes to count 10 movements (such as kicks, flutters, or rolls). Call your doctor if you don't feel at least 10 movements in a 2-hour period. Track your baby's movements once each day. Bring this record with you to each prenatal visit.  When should you call for help?  Share this information with your partner or a friend. They can help you watch for warning signs.  Call 911  anytime you think you may need emergency care. For example, call if:    You passed out (lost consciousness).     You have a seizure.     You have trouble breathing.     You have chest pain.   Call your  "doctor now or seek immediate medical care if:    You have symptoms of preeclampsia, such as:  Sudden swelling of your face, hands, or feet.  New vision problems (such as dimness, blurring, or seeing spots).  A severe headache.     Your blood pressure is very high, such as 160/110 or higher.     Your blood pressure is higher than your doctor told you it should be, or it rises quickly.     You have any vaginal bleeding.     You have new nausea or vomiting.     You think that you are in labor.     You have pain in your belly or pelvis.     You gain weight rapidly.   Where can you learn more?  Go to https://www.[employer].net/patiented  Enter A052 in the search box to learn more about \"High Blood Pressure in Pregnancy: Care Instructions.\"  Current as of: July 10, 2023  Content Version: 14.2    2024 [employer].   Care instructions adapted under license by your healthcare professional. If you have questions about a medical condition or this instruction, always ask your healthcare professional. [employer]orporated disclaims any warranty or liability for your use of this information.  Warning Signs after Having a Baby    Keep this paper on your fridge or somewhere else where you can see it.    Call your provider if you have any of these symptoms up to 12 weeks after having your baby.    Thoughts of hurting yourself or your baby  Pain in your chest or trouble breathing  Severe headache not helped by pain medicine  Eyesight concerns (blurry vision, seeing spots or flashes of light, other changes to eyesight)  Fainting, shaking or other signs of a seizure    Call 9-1-1 if you feel that it is an emergency.     The symptoms below can happen to anyone after giving birth. They can be very serious. Call your provider if you have any of these warning signs.    My provider s phone number: _______________________    Losing too much blood (hemorrhage)    Call your provider if you soak through a pad in less than an hour " or pass blood clots bigger than a golf ball. These may be signs that you are bleeding too much.    Blood clots in the legs or lungs    After you give birth, your body naturally clots its blood to help prevent blood loss. Sometimes this increased clotting can happen in other areas of the body, like the legs or lungs. This can block your blood flow and be very dangerous.     Call your provider if you:  Have a red, swollen spot on the back of your leg that is warm or painful when you touch it.   Are coughing up blood.     Infection    Call your provider if you have any of these symptoms:  Fever of 100.4 F (38 C) or higher.  Pain or redness around your stitches if you had an incision.   Any yellow, white, or green fluid coming from places where you had stitches or surgery.    Mood Problems (postpartum depression)    Many people feel sad or have mood changes after having a baby. But for some people, these mood swings are worse.     Call your provider right away if you feel so anxious or nervous that you can't care for yourself or your baby.    Preeclampsia (high blood pressure)    Even if you didn't have high blood pressure when you were pregnant, you are at risk for the high blood pressure disease called preeclampsia. This risk can last up to 12 weeks after giving birth.     Call your provider if you have:   Pain on your right side under your rib cage  Sudden swelling in the hands and face    Remember: You know your body. If something doesn't feel right, get medical help.     For informational purposes only. Not to replace the advice of your health care provider. Copyright 2020 [facility]. All rights reserved. Clinically reviewed by Indy Paez, RNC-OB, MSN. [employer] [id number] - Rev .     Section: What to Expect at Home  Your Recovery     A  section, or , is surgery to deliver your baby through a cut that the doctor makes in your lower belly and uterus. The cut is called an  incision.  You may have some pain in your lower belly and need pain medicine for 1 to 2 weeks. You can expect some vaginal bleeding for several weeks. You will probably need about 6 weeks to fully recover.  It's important to take it easy while the incision heals. Avoid heavy lifting, strenuous activities, and exercises that strain the belly muscles while you recover. Ask a family member or friend for help with housework, cooking, and shopping.  This care sheet gives you a general idea about how long it will take for you to recover. But each person recovers at a different pace. Follow the steps below to get better as quickly as possible.  How can you care for yourself at home?  Activity    Rest when you feel tired. Getting enough sleep will help you recover.     Try to walk each day. Start by walking a little more than you did the day before. Bit by bit, increase the amount you walk. Walking boosts blood flow and helps prevent pneumonia, constipation, and blood clots.     Avoid strenuous activities, such as bicycle riding, jogging, weightlifting, and aerobic exercise, for 6 weeks or until your doctor says it is okay.     Until your doctor says it is okay, do not lift anything heavier than your baby.     Do not do sit-ups or other exercises that strain the belly muscles for 6 weeks or until your doctor says it is okay.     Hold a pillow over your incision when you cough or take deep breaths. This will support your belly and decrease your pain.     You may shower as usual. Pat the incision dry when you are done.     You will have some vaginal bleeding. Wear sanitary pads. Do not douche or use tampons until your doctor says it is okay.     Ask your doctor when you can drive again.     You will probably need to take at least 6 weeks off work. It depends on the type of work you do and how you feel.     Ask your doctor when it is okay for you to have sex.   Diet    You can eat your normal diet. If your stomach is upset,  try bland, low-fat foods like plain rice, broiled chicken, toast, and yogurt.     Drink plenty of fluids (unless your doctor tells you not to).     You may notice that your bowel movements are not regular right after your surgery. This is common. Try to avoid constipation and straining with bowel movements. You may want to take a fiber supplement every day. If you have not had a bowel movement after a couple of days, ask your doctor about taking a mild laxative.     If you are breastfeeding, limit alcohol. Alcohol can cause a lack of energy and other health problems for the baby when a breastfeeding woman drinks heavily. It can also get in the way of a mom's ability to feed her baby or to care for the child in other ways. There isn't a lot of research about exactly how much alcohol can harm a baby. Having no alcohol is the safest choice for your baby. If you choose to have a drink now and then, have only one drink, and limit the number of occasions that you have a drink. Wait to breastfeed at least 2 hours after you have a drink to reduce the amount of alcohol the baby may get in the milk.   Medicines    Your doctor will tell you if and when you can restart your medicines. You will also get instructions about taking any new medicines.     If you stopped taking aspirin or some other blood thinner, your doctor will tell you when to start taking it again.     Take pain medicines exactly as directed.  If the doctor gave you a prescription medicine for pain, take it as prescribed.  If you are not taking a prescription pain medicine, ask your doctor if you can take an over-the-counter medicine.     If you think your pain medicine is making you sick to your stomach:  Take your medicine after meals (unless your doctor has told you not to).  Ask your doctor for a different pain medicine.     If your doctor prescribed antibiotics, take them as directed. Do not stop taking them just because you feel better. You need to take  the full course of antibiotics.   Incision care    If you have strips of tape on the incision, leave the tape on for a week or until it falls off.     Wash the area daily with warm, soapy water, and pat it dry. Don't use hydrogen peroxide or alcohol, which can slow healing. You may cover the area with a gauze bandage if it weeps or rubs against clothing. Change the bandage every day.     Keep the area clean and dry.   Other instructions    If you breastfeed your baby, you may be more comfortable while you are healing if you don't rest your baby on your belly. Try tucking your baby under your arm, with your baby's body along the side you will be feeding on. Support your baby's upper body with your arm. With that hand you can control your baby's head to bring your baby's mouth to your breast. This is sometimes called the [employer] hold.   Follow-up care is a key part of your treatment and safety. Be sure to make and go to all appointments, and call your doctor if you are having problems. It's also a good idea to know your test results and keep a list of the medicines you take.  When should you call for help?  Share this information with your partner, family, or a friend. They can help you watch for warning signs.  Call 911  anytime you think you may need emergency care. For example, call if:    You feel you cannot stop from hurting yourself, your baby, or someone else.     You passed out (lost consciousness).     You have chest pain, are short of breath, or cough up blood.     You have a seizure.   Where to get help 24 hours a day, 7 days a week   If you or someone you know talks about suicide, self-harm, a mental health crisis, a substance use crisis, or any other kind of emotional distress, get help right away. You can:    Call the Suicide and Crisis Lifeline at 188.     Call 2-730-525-TALK (1-905.298.4933).     Text HOME to 307912 to access the Crisis Text Line.   Consider saving these numbers in your phone.  Go to  [employer].org for more information or to chat online.  Call your doctor or midwife now or seek immediate medical care if:    You have loose stitches, or your incision comes open.     You have signs of hemorrhage (too much bleeding), such as:  Heavy vaginal bleeding. This means that you are soaking through one or more pads in an hour. Or you pass blood clots bigger than an egg.  Feeling dizzy or lightheaded, or you feel like you may faint.  Feeling so tired or weak that you cannot do your usual activities.  A fast or irregular heartbeat.  New or worse belly pain.     You have symptoms of infection, such as:  Increased pain, swelling, warmth, or redness.  Red streaks leading from the incision.  Pus draining from the incision.  A fever.  Frequent or painful urination or blood in your urine.  Vaginal discharge that smells bad.  New or worse belly pain.     You have symptoms of a blood clot in your leg (called a deep vein thrombosis), such as:  Pain in the calf, back of the knee, thigh, or groin.  Swelling in the leg or groin.  A color change on the leg or groin. The skin may be reddish or purplish, depending on your usual skin color.     You have signs of preeclampsia, such as:  Sudden swelling of your face, hands, or feet.  New vision problems (such as dimness, blurring, or seeing spots).  A severe headache.     You have signs of heart failure, such as:  New or increased shortness of breath.  New or worse swelling in your legs, ankles, or feet.  Sudden weight gain, such as more than 2 to 3 pounds in a day or 5 pounds in a week.  Feeling so tired or weak that you cannot do your usual activities.     You had spinal or epidural pain relief and have:  New or worse back pain.  Increased pain, swelling, warmth, or redness at the injection site.  Tingling, weakness, or numbness in your legs or groin.   Watch closely for changes in your health, and be sure to contact your doctor or midwife if:    Your vaginal bleeding isn't  "decreasing.     You feel sad, anxious, or hopeless for more than a few days.     You are having problems with your breasts or breastfeeding.   Where can you learn more?  Go to https://www.[employer].net/patiented  Enter M806 in the search box to learn more about \" Section: What to Expect at Home.\"  Current as of: July 10, 2023  Content Version: 2024 [facility] [employer]   Care instructions adapted under license by your healthcare professional. If you have questions about a medical condition or this instruction, always ask your healthcare professional. Healthwise, Incorporated disclaims any warranty or liability for your use of this information.    "

## 2024-10-03 NOTE — PLAN OF CARE
Goal Outcome Evaluation:      Plan of Care Reviewed With: patient, spouse    Overall Patient Progress: improvingOverall Patient Progress: improving    Outcome Evaluation: Post op day #1- patient is ambulating in halls. Voiding in good amounts, oral intake well. Encouraged to increase fluids due to +3 edema in lower legs. BP's increasing. IV saline lock.      Data: Vital signs within normal limits. Postpartum checks within normal limits - see flow record. Patient eating and drinking normally. Patient able to empty bladder independently and is up ambulating in room, and in hallways. Encouraged to increase fluids due to +3 edema in lower extremities, lungs clear. No apparent signs of infection. Incision healing well, incision covered, patient using an abdominal binder. Patient performing self cares and is able to care for infant.  Action: Patient medicated during the shift for pain and cramping. See MAR. Patient reassessed within 1 hour after each medication and pain was improved - patient stated she was comfortable. Patient education done about breastfeeding help, 24 hour testing for infant- results for that. Reasons for leaving IV in, BP's increasing watching BP's.  See flow record.  Response: Positive attachment behaviors observed with infant.  at bedside, patient has had visitors today.   Plan: Anticipate discharge on 10/5/24          Problem: Adult Inpatient Plan of Care  Goal: Plan of Care Review  Description: The Plan of Care Review/Shift note should be completed every shift.  The Outcome Evaluation is a brief statement about your assessment that the patient is improving, declining, or no change.  This information will be displayed automatically on your shift  note.  Outcome: Progressing  Flowsheets (Taken 10/3/2024 4979)  Outcome Evaluation: Post op day #1- patient is ambulating in halls. Voiding in good amounts, oral intake well. Encouraged to increase fluids due to +3 edema in lower legs. BP's  "increasing. IV saline lock.  Plan of Care Reviewed With:   patient   spouse  Overall Patient Progress: improving  Goal: Patient-Specific Goal (Individualized)  Description: You can add care plan individualizations to a care plan. Examples of Individualization might be:  \"Parent requests to be called daily at 9am for status\", \"I have a hard time hearing out of my right ear\", or \"Do not touch me to wake me up as it startles  me\".  Outcome: Progressing  Goal: Absence of Hospital-Acquired Illness or Injury  Outcome: Progressing  Intervention: Prevent Skin Injury  Recent Flowsheet Documentation  Taken 10/3/2024 1730 by Jennifer Lomeli RN  Body Position: position changed independently  Intervention: Prevent and Manage VTE (Venous Thromboembolism) Risk  Recent Flowsheet Documentation  Taken 10/3/2024 1730 by Jennifer Lomeli RN  VTE Prevention/Management: SCDs off (sequential compression devices)  Intervention: Prevent Infection  Recent Flowsheet Documentation  Taken 10/3/2024 1730 by Jennifer Lomeli RN  Infection Prevention: hand hygiene promoted  Goal: Optimal Comfort and Wellbeing  Outcome: Progressing  Intervention: Monitor Pain and Promote Comfort  Recent Flowsheet Documentation  Taken 10/3/2024 1732 by Jennifer Lomeli RN  Pain Management Interventions: medication (see MAR)  Intervention: Provide Person-Centered Care  Recent Flowsheet Documentation  Taken 10/3/2024 1730 by Jennifer Lomeli RN  Trust Relationship/Rapport:   care explained   emotional support provided   choices provided   empathic listening provided   questions answered   questions encouraged   reassurance provided   thoughts/feelings acknowledged  Goal: Readiness for Transition of Care  Outcome: Progressing     "

## 2024-10-03 NOTE — PLAN OF CARE
"Up walking in room and hallway. 2+ edema for both legs, but pt states she feels it is improving since yesterday. Incision covered with dressing. Plan to remove after shower tonight. Taking Ibu and Tyl with good relief. Also using ice packs and ab binder to incision.    Breastfeeding with shield and pumping. Using mothers love and gel pads.   Problem: Adult Inpatient Plan of Care  Goal: Plan of Care Review  Description: The Plan of Care Review/Shift note should be completed every shift.  The Outcome Evaluation is a brief statement about your assessment that the patient is improving, declining, or no change.  This information will be displayed automatically on your shift  note.  Outcome: Progressing  Flowsheets (Taken 10/3/2024 1704)  Plan of Care Reviewed With: patient  Goal: Patient-Specific Goal (Individualized)  Description: You can add care plan individualizations to a care plan. Examples of Individualization might be:  \"Parent requests to be called daily at 9am for status\", \"I have a hard time hearing out of my right ear\", or \"Do not touch me to wake me up as it startles  me\".  Outcome: Progressing  Goal: Absence of Hospital-Acquired Illness or Injury  Outcome: Progressing  Intervention: Prevent Infection  Recent Flowsheet Documentation  Taken 10/3/2024 1000 by Uzma Rasheed RN  Infection Prevention: hand hygiene promoted  Goal: Optimal Comfort and Wellbeing  Outcome: Progressing  Intervention: Provide Person-Centered Care  Recent Flowsheet Documentation  Taken 10/3/2024 1000 by Uzma Rasheed, RN  Trust Relationship/Rapport:   care explained   emotional support provided  Goal: Readiness for Transition of Care  Outcome: Progressing     Problem: Postpartum ( Delivery)  Goal: Successful Parent Role Transition  Outcome: Progressing  Goal: Hemostasis  Outcome: Progressing  Goal: Effective Bowel Elimination  Outcome: Progressing  Goal: Fluid and Electrolyte Balance  Outcome: Progressing  Goal: Absence of " Infection Signs and Symptoms  Outcome: Progressing  Goal: Anesthesia/Sedation Recovery  Outcome: Progressing  Goal: Optimal Pain Control and Function  Outcome: Progressing  Goal: Nausea and Vomiting Relief  Outcome: Progressing  Goal: Effective Urinary Elimination  Outcome: Progressing  Goal: Effective Oxygenation and Ventilation  Outcome: Progressing   Goal Outcome Evaluation:      Plan of Care Reviewed With: patient

## 2024-10-04 PROCEDURE — 250N000013 HC RX MED GY IP 250 OP 250 PS 637: Performed by: OBSTETRICS & GYNECOLOGY

## 2024-10-04 PROCEDURE — 120N000001 HC R&B MED SURG/OB

## 2024-10-04 PROCEDURE — 250N000011 HC RX IP 250 OP 636: Performed by: STUDENT IN AN ORGANIZED HEALTH CARE EDUCATION/TRAINING PROGRAM

## 2024-10-04 PROCEDURE — 250N000013 HC RX MED GY IP 250 OP 250 PS 637: Performed by: STUDENT IN AN ORGANIZED HEALTH CARE EDUCATION/TRAINING PROGRAM

## 2024-10-04 PROCEDURE — 999N000079 HC STATISTIC IP LACTATION SERVICES 1-15 MIN

## 2024-10-04 RX ORDER — NALOXONE HYDROCHLORIDE 0.4 MG/ML
0.2 INJECTION, SOLUTION INTRAMUSCULAR; INTRAVENOUS; SUBCUTANEOUS
Status: DISCONTINUED | OUTPATIENT
Start: 2024-10-04 | End: 2024-10-05 | Stop reason: HOSPADM

## 2024-10-04 RX ORDER — NALOXONE HYDROCHLORIDE 0.4 MG/ML
0.4 INJECTION, SOLUTION INTRAMUSCULAR; INTRAVENOUS; SUBCUTANEOUS
Status: DISCONTINUED | OUTPATIENT
Start: 2024-10-04 | End: 2024-10-05 | Stop reason: HOSPADM

## 2024-10-04 RX ORDER — LABETALOL 100 MG/1
100 TABLET, FILM COATED ORAL ONCE
Status: COMPLETED | OUTPATIENT
Start: 2024-10-04 | End: 2024-10-04

## 2024-10-04 RX ORDER — FUROSEMIDE 10 MG/ML
20 INJECTION INTRAMUSCULAR; INTRAVENOUS ONCE
Status: COMPLETED | OUTPATIENT
Start: 2024-10-04 | End: 2024-10-04

## 2024-10-04 RX ORDER — ENOXAPARIN SODIUM 100 MG/ML
40 INJECTION SUBCUTANEOUS EVERY 24 HOURS
Status: DISCONTINUED | OUTPATIENT
Start: 2024-10-04 | End: 2024-10-05 | Stop reason: HOSPADM

## 2024-10-04 RX ADMIN — IBUPROFEN 800 MG: 800 TABLET, FILM COATED ORAL at 01:28

## 2024-10-04 RX ADMIN — ACETAMINOPHEN 325MG 975 MG: 325 TABLET ORAL at 15:26

## 2024-10-04 RX ADMIN — IBUPROFEN 800 MG: 800 TABLET, FILM COATED ORAL at 09:17

## 2024-10-04 RX ADMIN — LABETALOL HYDROCHLORIDE 100 MG: 100 TABLET, FILM COATED ORAL at 09:53

## 2024-10-04 RX ADMIN — ACETAMINOPHEN 325MG 975 MG: 325 TABLET ORAL at 01:27

## 2024-10-04 RX ADMIN — IBUPROFEN 800 MG: 800 TABLET, FILM COATED ORAL at 15:27

## 2024-10-04 RX ADMIN — PRENATAL VITAMINS-IRON FUMARATE 27 MG IRON-FOLIC ACID 0.8 MG TABLET 1 TABLET: at 09:17

## 2024-10-04 RX ADMIN — FUROSEMIDE 20 MG: 10 INJECTION, SOLUTION INTRAMUSCULAR; INTRAVENOUS at 14:49

## 2024-10-04 RX ADMIN — ACETAMINOPHEN 325MG 975 MG: 325 TABLET ORAL at 21:31

## 2024-10-04 RX ADMIN — ACETAMINOPHEN 325MG 975 MG: 325 TABLET ORAL at 09:17

## 2024-10-04 RX ADMIN — SENNOSIDES AND DOCUSATE SODIUM 2 TABLET: 8.6; 5 TABLET ORAL at 19:52

## 2024-10-04 RX ADMIN — IBUPROFEN 800 MG: 800 TABLET, FILM COATED ORAL at 21:32

## 2024-10-04 RX ADMIN — SENNOSIDES AND DOCUSATE SODIUM 2 TABLET: 8.6; 5 TABLET ORAL at 09:17

## 2024-10-04 RX ADMIN — ENOXAPARIN SODIUM 40 MG: 40 INJECTION SUBCUTANEOUS at 14:49

## 2024-10-04 RX ADMIN — LABETALOL HYDROCHLORIDE 300 MG: 200 TABLET, FILM COATED ORAL at 19:52

## 2024-10-04 RX ADMIN — LABETALOL HYDROCHLORIDE 200 MG: 200 TABLET, FILM COATED ORAL at 09:21

## 2024-10-04 ASSESSMENT — ACTIVITIES OF DAILY LIVING (ADL)
ADLS_ACUITY_SCORE: 20

## 2024-10-04 NOTE — PLAN OF CARE
"Goal Outcome Evaluation:      Plan of Care Reviewed With: patient, spouse    Overall Patient Progress: improvingOverall Patient Progress: improving       Up ad jennifer. Voiding without difficulty . Taking tylenol and ibuprofen for pain . BP WDL. denies any symptoms, no headache, no visual problems, no epigastric pain .  Mom is breastpumping. Has sore nipple, using cream and lanolin.      Problem: Adult Inpatient Plan of Care  Goal: Plan of Care Review  Description: The Plan of Care Review/Shift note should be completed every shift.  The Outcome Evaluation is a brief statement about your assessment that the patient is improving, declining, or no change.  This information will be displayed automatically on your shift  note.  Outcome: Progressing  Flowsheets (Taken 10/4/2024 1846)  Plan of Care Reviewed With:   patient   spouse  Overall Patient Progress: improving  Goal: Patient-Specific Goal (Individualized)  Description: You can add care plan individualizations to a care plan. Examples of Individualization might be:  \"Parent requests to be called daily at 9am for status\", \"I have a hard time hearing out of my right ear\", or \"Do not touch me to wake me up as it startles  me\".  Outcome: Progressing  Goal: Absence of Hospital-Acquired Illness or Injury  Outcome: Progressing  Intervention: Prevent Skin Injury  Recent Flowsheet Documentation  Taken 10/4/2024 1527 by Priyanka Renae RN  Body Position: position changed independently  Intervention: Prevent and Manage VTE (Venous Thromboembolism) Risk  Recent Flowsheet Documentation  Taken 10/4/2024 1527 by Priyanka Renae RN  VTE Prevention/Management: SCDs off (sequential compression devices)  Intervention: Prevent Infection  Recent Flowsheet Documentation  Taken 10/4/2024 1527 by Priyanka Renae RN  Infection Prevention: hand hygiene promoted  Goal: Optimal Comfort and Wellbeing  Outcome: Progressing  Intervention: Monitor Pain and Promote Comfort  Recent Flowsheet " Documentation  Taken 10/4/2024 1527 by Priyanka Renae RN  Pain Management Interventions: medication (see MAR)  Intervention: Provide Person-Centered Care  Recent Flowsheet Documentation  Taken 10/4/2024 1527 by Priyanka Renae RN  Trust Relationship/Rapport: care explained  Goal: Readiness for Transition of Care  Outcome: Progressing     Problem: Postpartum ( Delivery)  Goal: Successful Parent Role Transition  Outcome: Progressing  Goal: Hemostasis  Outcome: Progressing  Goal: Effective Bowel Elimination  Outcome: Progressing  Goal: Fluid and Electrolyte Balance  Outcome: Progressing  Goal: Absence of Infection Signs and Symptoms  Outcome: Progressing  Goal: Anesthesia/Sedation Recovery  Outcome: Progressing  Goal: Optimal Pain Control and Function  Outcome: Progressing  Intervention: Prevent or Manage Pain  Recent Flowsheet Documentation  Taken 10/4/2024 1527 by Priyanka Renae RN  Pain Management Interventions: medication (see MAR)  Goal: Nausea and Vomiting Relief  Outcome: Progressing  Goal: Effective Urinary Elimination  Outcome: Progressing  Goal: Effective Oxygenation and Ventilation  Outcome: Progressing  Intervention: Optimize Oxygenation and Ventilation  Recent Flowsheet Documentation  Taken 10/4/2024 1527 by Priyanka Renae RN  Head of Bed (HOB) Positioning: HOB at 60-90 degrees

## 2024-10-04 NOTE — LACTATION NOTE
Lactation in to see patient. States breastfeeding improving. Unsure if she is hearing swallows. Wanting writer to assess a feed tomorrow as Beverly trying to rest after visit. Is supplementing with Donor milk after breastfeeding.

## 2024-10-04 NOTE — PROGRESS NOTES
[facility]   Post-partum Progress Note    Name:  Kate Gallo  MRN: [id number]    S:   Patient reports feeling well.  Pain  controlled.  Tolerating regular diet without nausea or vomiting.  Ambulating without dizziness.  Voiding spontaneously. Has  passed flatus; has not had bowel movement. Lochia similar to menstrual flow. Denies HA, vision changes, CP, SOB, RUQ pain. Still has a lot of edema.    O:   Patient Vitals for the past 24 hrs:   BP Temp Temp src Pulse Resp   10/04/24 0917 (!) 147/88 -- -- 83 --   10/04/24 0130 139/82 -- -- 78 --   10/03/24 2006 (!) 147/86 -- -- 88 --   10/03/24 1732 (!) 153/89 97.6  F (36.4  C) Oral -- 18   10/03/24 1330 137/80 -- -- -- 16   10/03/24 1000 132/78 98  F (36.7  C) Oral -- --     Gen:  Resting comfortably, NAD  CV:  Well perfused  Pulm:  Non-labored breathing. No cough or audible wheezing.   Abd:  Soft, appropriately tender to palpation, non-distended.  Fundus below umbilicus, firm, and non-tender.  Inc:  Bandage in place, c/d/i with minimal overlying shadowing, no surrounding erythema or edema  Ext:  Non-tender, 3+ LE edema b/l    Hgb:   Recent Labs   Lab 10/03/24  2109 10/03/24  0622 10/02/24  0602   HGB 9.8* 9.6* 10.6*  10.6*     A/P:  34 year old  POD#2 s/p PLTCS. Pregnancy notable for GDMA2. Intrapartum/Postpartum course notable for gHTN. Elevated BP overnight and will increase Labetalol this morning. Also still with lots of LE edema and will give Lasix again. Otherwise well and meeting postop goals. Continue with routine postpartum management.     #Routine Postpartum  Pain: Well-controlled with scheduled tylenol, toradol > ibuprofen, PRN oxy  Hgb: 10.6 > 9.8. VSS as noted above, asymptomatic. Will discharge with PO Fe if hgb < 10  GI:  Regular diet. Bowel regimen. Encourage hydration and ambulation.  : Voiding spontaneously  PPx:  Encouraged ambulation, Lovenox  Rh: Positive  Baby: In room, doing well  Feed: Breast feeding  BC: Not  discussed  Dispo: Plan for home on POD#3 if BP continue to remain stable.    #GDMA2 - Fbg 98. 6wk OGTT  #gHTN - BP normotensive to MR. Increase Labetalol from 200 to 300 BID this morning. HELLP labs wnl, repeat PRN. No symptoms. Discharge with BP cuff and BP check.  #LE Edema - s/p Lasix IV x2 postpartum. Another dose given today.     Cristian Mae MD MPH  [facility]  10/04/2024 9:24 AM

## 2024-10-04 NOTE — PLAN OF CARE
"Pt Bps slightly elevated, other VSS. Postpartum checks WDL. C/s incision is CDI. Is bonding well with infant. Tolerating regular diet and able to ambulate independently, Urine output adequate, voids without difficulty. Pt utilizing tylenol and ibuprofen for pain management. Breastfeeding infant and supplementing with donor milk every 2-3 hours. Pt is also pumping.    Problem: Adult Inpatient Plan of Care  Goal: Plan of Care Review  Description: The Plan of Care Review/Shift note should be completed every shift.  The Outcome Evaluation is a brief statement about your assessment that the patient is improving, declining, or no change.  This information will be displayed automatically on your shift  note.  Outcome: Progressing  Flowsheets (Taken 10/4/2024 0230)  Plan of Care Reviewed With: patient  Overall Patient Progress: improving  Goal: Patient-Specific Goal (Individualized)  Description: You can add care plan individualizations to a care plan. Examples of Individualization might be:  \"Parent requests to be called daily at 9am for status\", \"I have a hard time hearing out of my right ear\", or \"Do not touch me to wake me up as it startles  me\".  Outcome: Progressing  Goal: Absence of Hospital-Acquired Illness or Injury  Outcome: Progressing  Intervention: Prevent Skin Injury  Recent Flowsheet Documentation  Taken 10/4/2024 0100 by Kelley Campo RN  Body Position: position changed independently  Intervention: Prevent and Manage VTE (Venous Thromboembolism) Risk  Recent Flowsheet Documentation  Taken 10/4/2024 0100 by Kelley Campo RN  VTE Prevention/Management: SCDs off (sequential compression devices)  Intervention: Prevent Infection  Recent Flowsheet Documentation  Taken 10/4/2024 0100 by Kelley Campo RN  Infection Prevention:   hand hygiene promoted   rest/sleep promoted  Goal: Optimal Comfort and Wellbeing  Outcome: Progressing  Intervention: Provide Person-Centered Care  Recent Flowsheet " Documentation  Taken 10/4/2024 0100 by Kelley Campo, RN  Trust Relationship/Rapport:   care explained   choices provided   questions answered   questions encouraged   thoughts/feelings acknowledged  Goal: Readiness for Transition of Care  Outcome: Progressing     Problem: Postpartum ( Delivery)  Goal: Successful Parent Role Transition  Outcome: Progressing  Goal: Hemostasis  Outcome: Progressing  Goal: Effective Bowel Elimination  Outcome: Progressing  Goal: Fluid and Electrolyte Balance  Outcome: Progressing  Goal: Absence of Infection Signs and Symptoms  Outcome: Progressing  Goal: Anesthesia/Sedation Recovery  Outcome: Progressing  Goal: Optimal Pain Control and Function  Outcome: Progressing  Goal: Nausea and Vomiting Relief  Outcome: Progressing  Goal: Effective Urinary Elimination  Outcome: Progressing  Goal: Effective Oxygenation and Ventilation  Outcome: Progressing  Intervention: Optimize Oxygenation and Ventilation  Recent Flowsheet Documentation  Taken 10/4/2024 0100 by Kelley Campo, RN  Head of Bed (HOB) Positioning: HOB at 60-90 degrees

## 2024-10-04 NOTE — PROVIDER NOTIFICATION
10/03/24 2015   Provider Notification   Provider Name/Title Dr. SHOAIB Rick   Method of Notification Electronic Page   Request Evaluate-Remote   Notification Reason Status Update     Updated on last two Bps of 153/89 and 147/86. MD will order Labetalol 200mg BID to start now, and labs to be drawn now. Notify for severe range Bps.

## 2024-10-05 VITALS
BODY MASS INDEX: 33.18 KG/M2 | WEIGHT: 181.4 LBS | SYSTOLIC BLOOD PRESSURE: 140 MMHG | OXYGEN SATURATION: 100 % | DIASTOLIC BLOOD PRESSURE: 74 MMHG | RESPIRATION RATE: 16 BRPM | TEMPERATURE: 97.8 F | HEART RATE: 82 BPM

## 2024-10-05 PROBLEM — O13.9 GESTATIONAL HYPERTENSION: Status: ACTIVE | Noted: 2024-10-05

## 2024-10-05 PROCEDURE — 250N000013 HC RX MED GY IP 250 OP 250 PS 637: Performed by: STUDENT IN AN ORGANIZED HEALTH CARE EDUCATION/TRAINING PROGRAM

## 2024-10-05 PROCEDURE — 250N000013 HC RX MED GY IP 250 OP 250 PS 637: Performed by: OBSTETRICS & GYNECOLOGY

## 2024-10-05 PROCEDURE — 999N000080 HC STATISTIC IP LACTATION SERVICES 16-30 MIN

## 2024-10-05 RX ORDER — LABETALOL 300 MG/1
300 TABLET, FILM COATED ORAL EVERY 12 HOURS
Qty: 60 TABLET | Refills: 1 | Status: SHIPPED | OUTPATIENT
Start: 2024-10-05

## 2024-10-05 RX ORDER — IBUPROFEN 600 MG/1
600 TABLET, FILM COATED ORAL EVERY 6 HOURS PRN
Qty: 30 TABLET | Refills: 0 | Status: SHIPPED | OUTPATIENT
Start: 2024-10-05

## 2024-10-05 RX ADMIN — PRENATAL VITAMINS-IRON FUMARATE 27 MG IRON-FOLIC ACID 0.8 MG TABLET 1 TABLET: at 09:40

## 2024-10-05 RX ADMIN — ACETAMINOPHEN 325MG 975 MG: 325 TABLET ORAL at 09:39

## 2024-10-05 RX ADMIN — IBUPROFEN 800 MG: 800 TABLET, FILM COATED ORAL at 09:40

## 2024-10-05 RX ADMIN — IBUPROFEN 800 MG: 800 TABLET, FILM COATED ORAL at 04:00

## 2024-10-05 RX ADMIN — ACETAMINOPHEN 325MG 975 MG: 325 TABLET ORAL at 04:00

## 2024-10-05 RX ADMIN — LABETALOL HYDROCHLORIDE 300 MG: 200 TABLET, FILM COATED ORAL at 09:40

## 2024-10-05 RX ADMIN — SENNOSIDES AND DOCUSATE SODIUM 2 TABLET: 8.6; 5 TABLET ORAL at 09:40

## 2024-10-05 ASSESSMENT — ACTIVITIES OF DAILY LIVING (ADL)
ADLS_ACUITY_SCORE: 20

## 2024-10-05 NOTE — LACTATION NOTE
Lactation in to see patient. Baby at breast at time of visit with shield. Using 20 mm despite looking too large. Beverly stated her nipples were bleeding with 16 mm. After feed nipples measured and will purchase 19 mm flanges for her home pump. Baby looks jaundice-education around jaundice, sleepy baby, need for stools reviewed. Swallows heard. Encouraged compressions till milk comes in to keep baby engaged at breast. Parents continue to supplement with donor milk and Beverly pumps. Reviewed how to wean off of shield and guidelines to when to stop supplementing. All questions answered before home.

## 2024-10-05 NOTE — PLAN OF CARE
"BPs stable with Labetolol 300mg twice daily. Up walking ad jennifer. Showered yesterday. Using interdry on incision. Good relief with Ibu and Tylenol. Breastfeeding, pumping with increasing amounts of colostrum.  present and very supportive to mom and baby.     Discharge instructions reviewed and all questions answered. Has breast pump at home. Home meds given. Pumped milk verified with ANGELLA Healy and given to pt to take home.    Discharged home at 1415.      Problem: Adult Inpatient Plan of Care  Goal: Plan of Care Review  Description: The Plan of Care Review/Shift note should be completed every shift.  The Outcome Evaluation is a brief statement about your assessment that the patient is improving, declining, or no change.  This information will be displayed automatically on your shift  note.  Outcome: Met  Flowsheets (Taken 10/5/2024 1335)  Plan of Care Reviewed With:   patient   spouse  Goal: Patient-Specific Goal (Individualized)  Description: You can add care plan individualizations to a care plan. Examples of Individualization might be:  \"Parent requests to be called daily at 9am for status\", \"I have a hard time hearing out of my right ear\", or \"Do not touch me to wake me up as it startles  me\".  Outcome: Met  Goal: Absence of Hospital-Acquired Illness or Injury  Outcome: Met  Goal: Optimal Comfort and Wellbeing  Outcome: Met  Intervention: Monitor Pain and Promote Comfort  Recent Flowsheet Documentation  Taken 10/5/2024 09 by Uzma Rasheed RN  Pain Management Interventions: medication (see MAR)  Intervention: Provide Person-Centered Care  Recent Flowsheet Documentation  Taken 10/5/2024 0930 by Uzma Rasheed RN  Trust Relationship/Rapport:   care explained   emotional support provided  Goal: Readiness for Transition of Care  Outcome: Met     Problem: Postpartum ( Delivery)  Goal: Successful Parent Role Transition  Outcome: Met  Goal: Hemostasis  Outcome: Met  Goal: Effective Bowel " Elimination  Outcome: Met  Goal: Fluid and Electrolyte Balance  Outcome: Met  Goal: Absence of Infection Signs and Symptoms  Outcome: Met  Goal: Anesthesia/Sedation Recovery  Outcome: Met  Goal: Optimal Pain Control and Function  Outcome: Met  Intervention: Prevent or Manage Pain  Recent Flowsheet Documentation  Taken 10/5/2024 4097 by Uzma Rasheed RN  Pain Management Interventions: medication (see MAR)  Perineal Care: perineal hygiene encouraged  Goal: Nausea and Vomiting Relief  Outcome: Met  Goal: Effective Urinary Elimination  Outcome: Met  Goal: Effective Oxygenation and Ventilation  Outcome: Met   Goal Outcome Evaluation:      Plan of Care Reviewed With: patient, spouse

## 2024-10-05 NOTE — DISCHARGE SUMMARY
Discharge Summary        2024     Kate Gallo MRN# [id number]   YOB: 1990 Age: 34 year old     Date of Admission:  10/2/2024  Date of Discharge:  No discharge date for patient encounter.  Admitting Physician:  Daniel Muñoz MD  Discharge Physician:             Yayo Ray MD  Discharging Service:  Obstetrics & Gynecology    Home clinic: [facility]  Primary Provider: Kate Decker          Admission Diagnoses:   Encounter for supervision of normal first pregnancy in third trimester [Z34.03]  GDM, class A2 [O24.419]  History of loop electrosurgical excision procedure (LEEP) of cervix affecting pregnancy in second trimester [O34.42, Z98.890]  Edema in pregnancy, antepartum [O12.00]          Discharge Diagnosis:     Patient Active Problem List   Diagnosis    Chronic neck pain    KAELA III with severe dysplasia     delivery delivered    Gestational diabetes mellitus (GDM), delivered, current hospitalization    Gestational hypertension without significant proteinuria, postpartum    Gestational hypertension                Discharge Disposition:      Discharged to home           Condition on Discharge:     Discharge condition: Stable    Discharge vitals: Blood pressure 125/66, pulse 84, temperature 97.6  F (36.4  C), temperature source Oral, resp. rate 16, last menstrual period 2023, SpO2 100%, unknown if currently breastfeeding.   Code status on discharge: Full Code           Procedures / Labs / Imaging:   Procedures:   All laboratory data reviewed.  Imaging: N/A          Medications Prior to Admission:     Medications Prior to Admission   Medication Sig Dispense Refill Last Dose    acetaminophen (TYLENOL) 325 MG tablet Take 325-650 mg by mouth every 6 hours as needed for mild pain.       famotidine (PEPCID) 20 MG tablet        hydrocortisone 2.5 % cream Apply topically 2 times daily 30 g 0     Prenatal Vit-Fe Fumarate-FA (PNV PRENATAL PLUS  MULTIVITAMIN) 27-1 MG TABS per tablet Take 1 tablet by mouth daily       acetone urine (KETOSTIX) test strip Use to check urine for ketones every morning. 50 strip 3     blood glucose (NO BRAND SPECIFIED) test strip Use to test blood sugar 4 times daily (before breakfast and 1 hour after start of each meal). To accompany: Blood Glucose Monitor Brands: per insurance. 150 strip 4     blood glucose monitoring (NO BRAND SPECIFIED) meter device kit Use to test blood sugar 4 times daily. Preferred blood glucose meter OR supplies to accompany: Blood Glucose Monitor Brands: per insurance. 1 kit 0     Continuous Glucose Sensor (FREESTYLE CLARKE 3 PLUS SENSOR) MISC Change every 15 days. 6 each 1     Continuous Glucose Sensor (FREESTYLE CLARKE 3 SENSOR) MISC 1 each continuously 2 each 11     insulin pen needle (ULTICARE MICRO) 32G X 4 MM miscellaneous Use 1 pen needles daily or as directed. 100 each 3     thin (NO BRAND SPECIFIED) lancets Use with lanceting device. To accompany: Blood Glucose Monitor Brands: per insurance. 200 each 3     [DISCONTINUED] fluticasone (FLONASE) 50 MCG/ACT nasal spray Spray 1 spray into both nostrils daily (Patient not taking: Reported on 10/1/2024) 9.9 mL 0 Not Taking    [DISCONTINUED] Insulin Glargine-yfgn (SEMGLEE, YFGN,) 100 UNIT/ML SOPN Inject 8 Units subcutaneously at bedtime. Max TDD is 25 units a directed by provider. 15 mL 1     [DISCONTINUED] ondansetron (ZOFRAN) 4 MG tablet Take 1 tablet (4 mg) by mouth every 8 hours as needed for nausea. 20 tablet 0     [DISCONTINUED] ondansetron (ZOFRAN) 4 MG tablet Take 2 tablets (8 mg) by mouth every 8 hours as needed for nausea 20 tablet 2              Discharge Medications:     Current Discharge Medication List        START taking these medications    Details   ibuprofen (ADVIL/MOTRIN) 600 MG tablet Take 1 tablet (600 mg) by mouth every 6 hours as needed for mild pain or moderate pain. Take w/ food  Qty: 30 tablet, Refills: 0    Associated Diagnoses:   delivery delivered      labetalol (NORMODYNE) 300 MG tablet Take 1 tablet (300 mg) by mouth every 12 hours.  Qty: 60 tablet, Refills: 1    Associated Diagnoses: Gestational hypertension without significant proteinuria, postpartum           CONTINUE these medications which have NOT CHANGED    Details   acetaminophen (TYLENOL) 325 MG tablet Take 325-650 mg by mouth every 6 hours as needed for mild pain.      famotidine (PEPCID) 20 MG tablet       hydrocortisone 2.5 % cream Apply topically 2 times daily  Qty: 30 g, Refills: 0    Associated Diagnoses: Dermatitis      Prenatal Vit-Fe Fumarate-FA (PNV PRENATAL PLUS MULTIVITAMIN) 27-1 MG TABS per tablet Take 1 tablet by mouth daily      acetone urine (KETOSTIX) test strip Use to check urine for ketones every morning.  Qty: 50 strip, Refills: 3    Comments: If this is not preferred brand on insurance, please substitute brand that is preferred but please ensure will check urine for ketones.  Associated Diagnoses: Gestational diabetes mellitus (GDM) in third trimester, gestational diabetes method of control unspecified      blood glucose (NO BRAND SPECIFIED) test strip Use to test blood sugar 4 times daily (before breakfast and 1 hour after start of each meal). To accompany: Blood Glucose Monitor Brands: per insurance.  Qty: 150 strip, Refills: 4    Associated Diagnoses: Gestational diabetes mellitus (GDM) in third trimester, gestational diabetes method of control unspecified      blood glucose monitoring (NO BRAND SPECIFIED) meter device kit Use to test blood sugar 4 times daily. Preferred blood glucose meter OR supplies to accompany: Blood Glucose Monitor Brands: per insurance.  Qty: 1 kit, Refills: 0    Associated Diagnoses: Gestational diabetes mellitus (GDM) in third trimester, gestational diabetes method of control unspecified      !! Continuous Glucose Sensor (FREESTYLE CLARKE 3 PLUS SENSOR) MISC Change every 15 days.  Qty: 6 each, Refills: 1    Associated  Diagnoses: Gestational diabetes mellitus (GDM) in third trimester, gestational diabetes method of control unspecified      !! Continuous Glucose Sensor (FREESTYLE CLARKE 3 SENSOR) MISC 1 each continuously  Qty: 2 each, Refills: 11    Comments: Patient is on insulin- request PA if needed  Associated Diagnoses: Gestational diabetes mellitus (GDM) in third trimester, gestational diabetes method of control unspecified      insulin pen needle (ULTICARE MICRO) 32G X 4 MM miscellaneous Use 1 pen needles daily or as directed.  Qty: 100 each, Refills: 3    Associated Diagnoses: Gestational diabetes mellitus (GDM) in third trimester, gestational diabetes method of control unspecified      thin (NO BRAND SPECIFIED) lancets Use with lanceting device. To accompany: Blood Glucose Monitor Brands: per insurance.  Qty: 200 each, Refills: 3    Associated Diagnoses: Gestational diabetes mellitus (GDM) in third trimester, gestational diabetes method of control unspecified       !! - Potential duplicate medications found. Please discuss with provider.        STOP taking these medications       fluticasone (FLONASE) 50 MCG/ACT nasal spray Comments:   Reason for Stopping:         Insulin Glargine-yfgn (SEMGLEE, YFGN,) 100 UNIT/ML SOPN Comments:   Reason for Stopping:         ondansetron (ZOFRAN) 4 MG tablet Comments:   Reason for Stopping:         ondansetron (ZOFRAN) 4 MG tablet Comments:   Reason for Stopping:                     Consultations:     No consultations were requested during this admission             Brief History of Illness:   This patient was a 34 year old pregnant female,  1, who presented with intrauterine pregnancy of gestational age  39w3d.          Hospital Course:     Patient was admitted to the OR as a same-day admission and underwent a  section as noted in the separate operative report.  She had good urine output for the first several hours and had her Acosta catheter removed on POD#0.  She was  "tolerating clear liquids and began ambulating on POD#1. Hgb dropped to 9.6 on POD#1 consistent with mild Acute anemia due to blood loss and dilution due to typical intra-op blood loss. This was asymptomatic and did not require an specific treatment other than continuing her PNVs w/ Fe on discharge home. In the evening of POD#1 she developed new onset mild-range HTN. Preeclampsia labs were normal. She was started on Labetalol 200 mg BID. By POD#2 she was passing flatus and tolerating regular diet well. Her BPs were still a little more elevated than ideally so her Labetalol was increased to 300 mg BID with good improvement. She also had a lot of LE edema so was given a few doses of IV Lasix with some improvement. By POD#3 she was ready for D/C home.  Her incision remained clean, dry, and intact without erythema or induration.  She was D/C'd home POD#3 with instructions to f/u in 6 weeks.          Physical Exam:    O:  Blood pressure 125/66, pulse 84, temperature 97.6  F (36.4  C), temperature source Oral, resp. rate 16, last menstrual period 12/31/2023, SpO2 100%, unknown if currently breastfeeding.        No intake or output data in the 24 hours ending 10/05/24 0734        Abdomen - Non-distended, Normoactive bowel sounds, soft, appropriately tender; Fundus firm, at umbilicus, nontender        Dressing/Incision - clean, dry, intact        Extremities - No calf tenderness           Data:  Hemoglobin   Date Value Ref Range Status   10/03/2024 9.8 (L) 11.7 - 15.7 g/dL Final   10/03/2024 9.6 (L) 11.7 - 15.7 g/dL Final     No results found for: \"RUBELLAABIGG\"   No results found for: \"ABO\"      No results found for: \"RH\"            Discharge Instructions and Follow-Up:     Discharge diet: Regular   Discharge activity: Activity as tolerated   Discharge follow-up: Follow up with Dr. Muñoz in 6 weeks  Follow up with OB RN in 3-4 days for BP check.   Outpatient therapy: None   Home Care agency: None   Supplies and equipment: " Breast Pump, BP cuff   Lines and drains: None   Wound care: Keep Dry, Wash with soap and water, protect from sunlight, do not soak in water for 14 days (Swimming, Baths), but may shower.  Please call if wound becomes red, swollen, hot or begins draining pus like fluid.   Other instructions: None     Yayo Ray MD

## 2024-10-05 NOTE — PLAN OF CARE
Pt Bps slightly elevated, other VSS. Postpartum checks WDL. C/s incision is CDI, no signs of infections. Is bonding well with infant. Tolerating regular diet and able to ambulate independently. Urine output adequate, voids without difficulty. Pt utilizing tylenol and ibuprofen for pain management. Breastfeeding every 2-3 hours and supplementing with donor milk. Pumping, has milk in the fridge.    Problem: Adult Inpatient Plan of Care  Goal: Plan of Care Review  Description: The Plan of Care Review/Shift note should be completed every shift.  The Outcome Evaluation is a brief statement about your assessment that the patient is improving, declining, or no change.  This information will be displayed automatically on your shift  note.  Outcome: Progressing  Flowsheets (Taken 10/5/2024 0240)  Plan of Care Reviewed With: patient  Overall Patient Progress: improving  Outcome: Progressing  Goal: Absence of Hospital-Acquired Illness or Injury  Outcome: Progressing  Intervention: Prevent Skin Injury  Recent Flowsheet Documentation  Taken 10/5/2024 0115 by Kelley Campo RN  Body Position: position changed independently  Intervention: Prevent Infection  Recent Flowsheet Documentation  Taken 10/5/2024 0115 by Kelley Campo RN  Infection Prevention:   hand hygiene promoted   rest/sleep promoted  Goal: Optimal Comfort and Wellbeing  Outcome: Progressing  Intervention: Provide Person-Centered Care  Recent Flowsheet Documentation  Taken 10/5/2024 0115 by Kelley Campo RN  Trust Relationship/Rapport:   care explained   choices provided   questions answered   questions encouraged   thoughts/feelings acknowledged  Goal: Readiness for Transition of Care  Outcome: Progressing     Problem: Postpartum ( Delivery)  Goal: Successful Parent Role Transition  Outcome: Progressing  Goal: Hemostasis  Outcome: Progressing  Goal: Effective Bowel Elimination  Outcome: Progressing  Goal: Fluid and Electrolyte Balance  Outcome:  Progressing  Goal: Absence of Infection Signs and Symptoms  Outcome: Progressing  Goal: Anesthesia/Sedation Recovery  Outcome: Progressing  Goal: Optimal Pain Control and Function  Outcome: Progressing  Goal: Nausea and Vomiting Relief  Outcome: Progressing  Goal: Effective Urinary Elimination  Outcome: Progressing  Goal: Effective Oxygenation and Ventilation  Outcome: Progressing  Intervention: Optimize Oxygenation and Ventilation  Recent Flowsheet Documentation  Taken 10/5/2024 0115 by Kelley Campo RN  Head of Bed (HOB) Positioning: HOB at 60-90 degrees

## 2024-10-08 ENCOUNTER — TELEPHONE (OUTPATIENT)
Dept: OBGYN | Facility: CLINIC | Age: 34
End: 2024-10-08

## 2024-10-08 ENCOUNTER — ALLIED HEALTH/NURSE VISIT (OUTPATIENT)
Dept: OBGYN | Facility: CLINIC | Age: 34
End: 2024-10-08
Payer: COMMERCIAL

## 2024-10-08 VITALS — DIASTOLIC BLOOD PRESSURE: 72 MMHG | BODY MASS INDEX: 32.12 KG/M2 | WEIGHT: 175.6 LBS | SYSTOLIC BLOOD PRESSURE: 122 MMHG

## 2024-10-08 PROCEDURE — 99207 PR NO CHARGE NURSE ONLY: CPT

## 2024-10-08 NOTE — TELEPHONE ENCOUNTER
Pt was here for an RN BP check.   Had a CS on 10/2/24.    Dx with PP HTN and is currently on Labetolol 300 mg every 12 hrs. Last took at 10 pm last night.    No concerns at this time.     BP in clinic were:  118/74  122/72    Pt was advised to continue to monitor for sx of HTN, monitor BP at home, and continue taking her Labetalol.    She will call with any concerns.     I did schedule her for an appt with Dr. Muñoz on 10/15/24.    Message routed to md to see if there was anything else that he would like pt to do at this time.    Uzma GEIGER, [facility]

## 2024-10-08 NOTE — TELEPHONE ENCOUNTER
Daniel Muñoz MD  You2 minutes ago (12:15 PM)       Agree with advice and follow up plan provided.    Thank you    Dr. Muñoz

## 2024-10-08 NOTE — NURSING NOTE
Subjective:  Kate is being seen in clinic for a blood pressure check due to Postpartum preeclampsia. Patient is Postpartum.    Patient reports taking the following antihypertensive medications: labetalol    Patient reports the following symptoms: no hypertension symptoms.     Objective:  /72   Wt 79.7 kg (175 lb 9.6 oz)   LMP 12/31/2023 (Exact Date)   Breastfeeding Yes   BMI 32.12 kg/m   Blood pressure taken on       Two blood pressures taken, at least one minute apart.     Plan:    If patient's BP is LOW (Systolic less than 100 OR Diastolic less than 60): RN triage team to be contacted ASAP for next steps.     If patient's BP is NORMAL (Systolic between 100-139 OR Diastolic between 60-89): OK for patient to leave clinic and rooming staff to route chart to ordering provider.     If patient's BP is HIGH (Systolic between 140-159 OR Diastolic between ): RN triage team to be contacted ASAP for next steps.    If patient's BP is VERY HIGH (Systolic 160 and higher OR Diastolic 110 and higher): RN triage team to be contacted ASAP for next steps.       Signs and symptoms of hypertension reviewed with patient.     Pt will continue taking her labetalol and monitoring her BP at home. She will call with any questions/concerns.   I did schedule her for a f/up appt with Dr. Muñoz on 10/15/24.    Uzma GEIGER RN  [facility]

## 2024-10-15 ENCOUNTER — OFFICE VISIT (OUTPATIENT)
Dept: OBGYN | Facility: CLINIC | Age: 34
End: 2024-10-15
Payer: COMMERCIAL

## 2024-10-15 VITALS — SYSTOLIC BLOOD PRESSURE: 100 MMHG | WEIGHT: 169.6 LBS | BODY MASS INDEX: 31.02 KG/M2 | DIASTOLIC BLOOD PRESSURE: 62 MMHG

## 2024-10-15 PROCEDURE — 99213 OFFICE O/P EST LOW 20 MIN: CPT | Performed by: OBSTETRICS & GYNECOLOGY

## 2024-10-15 ASSESSMENT — EDINBURGH POSTNATAL DEPRESSION SCALE (EPDS)
I HAVE BEEN SO UNHAPPY THAT I HAVE BEEN CRYING: ONLY OCCASIONALLY
I HAVE LOOKED FORWARD WITH ENJOYMENT TO THINGS: AS MUCH AS I EVER DID
I HAVE BEEN SO UNHAPPY THAT I HAVE HAD DIFFICULTY SLEEPING: NOT AT ALL
THINGS HAVE BEEN GETTING ON TOP OF ME: NO, MOST OF THE TIME I HAVE COPED QUITE WELL
THE THOUGHT OF HARMING MYSELF HAS OCCURRED TO ME: NEVER
I HAVE FELT SAD OR MISERABLE: NOT VERY OFTEN
I HAVE BLAMED MYSELF UNNECESSARILY WHEN THINGS WENT WRONG: NOT VERY OFTEN
I HAVE BEEN ABLE TO LAUGH AND SEE THE FUNNY SIDE OF THINGS: AS MUCH AS I ALWAYS COULD
TOTAL SCORE: 6
I HAVE FELT SCARED OR PANICKY FOR NO GOOD REASON: NO, NOT AT ALL
I HAVE BEEN ANXIOUS OR WORRIED FOR NO GOOD REASON: YES, SOMETIMES
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
## 2024-10-15 NOTE — PROGRESS NOTES
Chief Complaint   Patient presents with    RECHECK       Subjective:  34-year-old P1 status post primary low segment transverse  section on 10/2 and a pregnancy complicated by gestational diabetes A2 and marked gestational edema.  She was discharged on labetalol 300 mg twice daily and follows up today for a blood pressure check.  Of note she had a 1 week check for BP with the nurse and it was normal     I reviewed her BPs which she has been taking at home and recording and they are all normal.  Her pedal edema is markedly improved although not completely back to normal as yet.    She denies any headache, visual changes or right upper quadrant pain.  Other than being tired she is without complaints      REVIEW OF SYSTEMS:  General: as above    Health Maintenance   Topic Date Due    DEPRESSION ACTION PLAN  Never done    HPV IMMUNIZATION (3 - 3-dose series) 2017    COVID-19 Vaccine ( season) 2024    PHQ-9  2025    PAP FOLLOW-UP  2025    HPV FOLLOW-UP  2025    YEARLY PREVENTIVE VISIT  2025    GLUCOSE  10/03/2027    ADVANCE CARE PLANNING  2029    DTAP/TDAP/TD IMMUNIZATION (9 - Td or Tdap) 2034    RSV VACCINE (1 - 1-dose 75+ series) 2067    INFLUENZA VACCINE  Completed    HEPATITIS B IMMUNIZATION  Completed    Pneumococcal Vaccine: Pediatrics (0 to 5 Years) and At-Risk Patients (6 to 64 Years)  Aged Out    MENINGITIS IMMUNIZATION  Aged Out    RSV MONOCLONAL ANTIBODY  Aged Out    HEPATITIS C SCREENING  Discontinued    HIV SCREENING  Discontinued    PAP  Discontinued       Allergies   Allergen Reactions    Codeine Shortness Of Breath, Nausea and Vomiting and Rash    Doxycycline Dizziness and Rash       Objective:  Vitals: LMP 2023 (Exact Date)   BMI= There is no height or weight on file to calculate BMI.    Abdomen: Incision is clean dry and intact.    Extremities with 1+ bilateral pedal edema  Assessment/Plan:  1.  delivery  delivered  Normal 2-week postop check    2. Postpartum hypertension  Patient instructed to wean off of the labetalol by going to once daily dosing for 1 week and then stopping altogether    3.  Return to clinic 4 weeks for 6-week postpartum visit        Jorge Muñoz MD

## 2024-10-15 NOTE — NURSING NOTE
"Chief Complaint   Patient presents with    RECHECK       Initial /62   Wt 76.9 kg (169 lb 9.6 oz)   LMP 2023 (Exact Date)   BMI 31.02 kg/m   Estimated body mass index is 31.02 kg/m  as calculated from the following:    Height as of 24: 1.575 m (5' 2\").    Weight as of this encounter: 76.9 kg (169 lb 9.6 oz).  BP completed using cuff size: regular    Questioned patient about current smoking habits.  Pt. has never smoked.          The following HM Due: NONE    "

## 2024-10-16 ENCOUNTER — OFFICE VISIT (OUTPATIENT)
Dept: OBGYN | Facility: CLINIC | Age: 34
End: 2024-10-16
Payer: COMMERCIAL

## 2024-10-16 VITALS
WEIGHT: 168.9 LBS | HEART RATE: 77 BPM | SYSTOLIC BLOOD PRESSURE: 93 MMHG | DIASTOLIC BLOOD PRESSURE: 65 MMHG | BODY MASS INDEX: 30.89 KG/M2 | OXYGEN SATURATION: 97 %

## 2024-10-16 DIAGNOSIS — O92.79 OTHER DISORDERS OF LACTATION: Primary | ICD-10-CM

## 2024-10-16 PROCEDURE — 99417 PROLNG OP E/M EACH 15 MIN: CPT | Performed by: ADVANCED PRACTICE MIDWIFE

## 2024-10-16 PROCEDURE — 99215 OFFICE O/P EST HI 40 MIN: CPT | Performed by: ADVANCED PRACTICE MIDWIFE

## 2024-10-16 NOTE — PATIENT INSTRUCTIONS
"   Use good positioning for deep latch, with baby held close to body and baby's head/shoulders/hips in good alignment.  When in a seated position, use a pillow to help bring baby close to breasts, and stepstool to elevate your knees above hips.    When bringing your baby to your breast, compress your breast vertically and in line with baby's mouth--this will help them to get a larger mouthful of breast and a deeper latch. Babies latch best to the breast by bringing their chin in first, so point your nipple towards baby's nose, tuck the chin in close, and then wait for her mouth to open.  When her mouth opens, bring her head in deeply.  Baby's chin should be snugged deeply in your breast, their upper cheeks should be touching the breast, and their nose just out of the breast.   If there is pinching or pain, try using a finger to give a little gentle pressure on her chin to help her open more widely and take in more of your breast.  If it is still painful, use a finger to break the suction, remove baby from the breast and try again until there is no pain with nursing.  There is sometimes a little pain when the baby first begins sucking, but after the first few seconds there should be no pain--only a tugging feeling.   Continue to nurse baby on cue, 8-12 times each day.  Once your baby has returned to their birthweight, you can trust them to awaken when they need to eat--you do not need to awaken them on a schedule.  When you nurse, feed on one side until baby finishes swallowing.  Once swallowing slows, use breast compression to encourage more swallowing, but once there is no more active swallowing, and baby is either sleeping, coming off the breast, or just \"nibbling,\" it is OK to use a finger to take baby off the breast and move to the other breast.  Do the same on the other side.  Offer both breasts at each feeding, but if she does not take the second side, you do not need to pump that side.  If you find you are " very full and unable to remain comfortable until the next feeding, then just hand-express or pump a small amount, just enough so that you are comfortable.    Jana needs about 25 oz of milk each day to grow well.  If she nurses at home as she did in the office today, she is taking plenty of milk and does not need extra in bottles.  If you would like to offer occasional bottles for rest or convenience, that is fine to do.  You can pump to get the milk for this bottle at any time that is convenient for you--many people like to pump after the first morning feeding because supply tends to be highest at that time of day.  You do not need to pump more milk than you need to have in bottles. You can then give that milk in a bottle later in the day.  Use the paced feeding method when giving bottles, to help babies learn to go more easily between breast and bottle, and avoid overfeeding (more possible at the bottle than the breast).  2-3 oz is a good amount to give in bottles.  Follow up with lactation as needed, and pediatric provider as planned.  [employer] can be used for brief questions, but it's important to know that messages are not seen Friday through Sunday. If urgent help is needed, Monday through Friday you can call [phone] and one of our lactation consultants will get the message and respond; if you need a rapid response over a weekend or holiday, it is best to call your on-call maternity or pediatric provider.  Please feel free to schedule a return visit if the concern is more detailed;  telephone visits are also an option if you don't feel you need to be seen in person.   ________________       If you find that you are having a lot of uncomfortable leaking, you can just use a hand to put some pressure on your areolar area and this will stop the flow.  If you would like to collect the milk that is released with letdown without encouraging more supply, consider a type of  that does not use suction to  "stay in place.  The Haakaa Annette, Cristina Catch, and Milkies Milk-Saver can all be used in this way.  Alternatively, if you would like to use a  to gather enough milk for a bottle, you could use a type that stays in place with suction and draws out more milk.  The best way to use these is to nurse your baby on one side, and then when you move baby to the second side, place the  on the first side.  In this way baby always gets \"first choice.\"  Just collect the amount of milk that you will use.       __________________________________    Good breastfeeding resources:    Websites:  www.[employer]  www.Tripsh[employer]/blog/  www.[employer].org/breastfeeding-info/    Instagram:    [email or web address]  [email or web address]    Books:   The Womanly Art of Breastfeeding by [employer]  Breastfeeding Doesn't Need to Suck, by Shelly Khan      For help with using baby carriers:  https://babywearingtwincities.org/   "

## 2024-11-04 ENCOUNTER — MEDICAL CORRESPONDENCE (OUTPATIENT)
Dept: HEALTH INFORMATION MANAGEMENT | Facility: CLINIC | Age: 34
End: 2024-11-04
Payer: COMMERCIAL

## 2024-11-19 ENCOUNTER — PRENATAL OFFICE VISIT (OUTPATIENT)
Dept: OBGYN | Facility: CLINIC | Age: 34
End: 2024-11-19
Attending: OBSTETRICS & GYNECOLOGY
Payer: COMMERCIAL

## 2024-11-19 VITALS — SYSTOLIC BLOOD PRESSURE: 120 MMHG | DIASTOLIC BLOOD PRESSURE: 78 MMHG | WEIGHT: 161 LBS | BODY MASS INDEX: 29.45 KG/M2

## 2024-11-19 PROCEDURE — 99207 PR POST PARTUM EXAM: CPT | Performed by: OBSTETRICS & GYNECOLOGY

## 2024-11-19 ASSESSMENT — EDINBURGH POSTNATAL DEPRESSION SCALE (EPDS)
THE THOUGHT OF HARMING MYSELF HAS OCCURRED TO ME: NEVER
TOTAL SCORE: 8
THINGS HAVE BEEN GETTING ON TOP OF ME: NO, MOST OF THE TIME I HAVE COPED QUITE WELL
I HAVE LOOKED FORWARD WITH ENJOYMENT TO THINGS: AS MUCH AS I EVER DID
I HAVE BEEN ABLE TO LAUGH AND SEE THE FUNNY SIDE OF THINGS: AS MUCH AS I ALWAYS COULD
I HAVE BLAMED MYSELF UNNECESSARILY WHEN THINGS WENT WRONG: NOT VERY OFTEN
I HAVE BEEN SO UNHAPPY THAT I HAVE HAD DIFFICULTY SLEEPING: NOT AT ALL
I HAVE BEEN ANXIOUS OR WORRIED FOR NO GOOD REASON: YES, SOMETIMES
I HAVE BEEN SO UNHAPPY THAT I HAVE BEEN CRYING: ONLY OCCASIONALLY
I HAVE FELT SCARED OR PANICKY FOR NO GOOD REASON: YES, SOMETIMES
I HAVE FELT SAD OR MISERABLE: NOT VERY OFTEN

## 2024-11-19 NOTE — PROGRESS NOTES
SUBJECTIVE:  Kate Gallo,  is here for a postpartum visit.  She had a planned primary LST  Section  on 10/2/24 delivering a healthy baby girl, named Jana weighing 8 lbs 12 oz at term.      HPI:  After counseling she opted for a planned  at 39 weeks due to GDM and concern for LGA.  Pregnancy had also been complicated by significant gestational edema.  She required antihypertensive therapy postpartum but has been weaned off.  She has been meeting with a lactation consultant.    Last PHQ-9 score on record=       2024     8:33 AM   PHQ-9 SCORE   PHQ-9 Total Score MyChart 12 (Moderate depression)   PHQ-9 Total Score 12         2022    10:19 AM   SHAHEED-7 SCORE   Total Score 4 (minimal anxiety)   Total Score 4       Delivery complications:  No  Breast feeding:  Yes, working with lactation consultant  Bladder problems:  No  Bowel problems/hemorrhoids:  No  Episiotomy/laceration/incision healed? N/A  Vaginal flow:  None  [place]:  No  Contraception: condoms  Emotional adjustment:  having some difficulties adjusting  Back to work:      Psychiatric: Depression  12 point review of systems negative other than symptoms noted below.    OBJECTIVE:  Vitals: /78   Wt 73 kg (161 lb)   LMP 2023 (Exact Date)   Breastfeeding Yes   BMI 29.45 kg/m    BMI= Body mass index is 29.45 kg/m .  General - pleasant female in no acute distress.  Breast -  deferred  Abdomen - Incision well-healed  Pelvic - EG: deferred.  Rectovaginal - deferred.    ASSESSMENT:    ICD-10-CM    1. Routine postpartum follow-up  Z39.2           PLAN:  May resume normal activities without restrictions.  Pap smear was not done today.    Full counseling was provided, and all questions were answered.   Return to clinic in one year for an annual visit.   Pap 3/2025    Jorge Muñoz MD

## 2024-11-21 ENCOUNTER — OFFICE VISIT (OUTPATIENT)
Dept: URGENT CARE | Facility: URGENT CARE | Age: 34
End: 2024-11-21
Payer: COMMERCIAL

## 2024-11-21 VITALS
HEART RATE: 66 BPM | TEMPERATURE: 97.1 F | SYSTOLIC BLOOD PRESSURE: 109 MMHG | OXYGEN SATURATION: 98 % | DIASTOLIC BLOOD PRESSURE: 66 MMHG | BODY MASS INDEX: 29.45 KG/M2 | WEIGHT: 161 LBS

## 2024-11-21 DIAGNOSIS — S91.332A PUNCTURE WOUND OF LEFT FOOT, INITIAL ENCOUNTER: Primary | ICD-10-CM

## 2024-11-21 ASSESSMENT — ENCOUNTER SYMPTOMS
JOINT SWELLING: 0
WOUND: 1
NUMBNESS: 0
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
## 2024-12-04 ENCOUNTER — MEDICAL CORRESPONDENCE (OUTPATIENT)
Dept: HEALTH INFORMATION MANAGEMENT | Facility: CLINIC | Age: 34
End: 2024-12-04
Payer: COMMERCIAL
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
## 2024-12-10 ENCOUNTER — VIRTUAL VISIT (OUTPATIENT)
Dept: MIDWIFE SERVICES | Facility: CLINIC | Age: 34
End: 2024-12-10
Payer: COMMERCIAL

## 2024-12-10 DIAGNOSIS — O92.79 OTHER DISORDERS OF LACTATION: Primary | ICD-10-CM

## 2024-12-10 PROCEDURE — 99417 PROLNG OP E/M EACH 15 MIN: CPT | Mod: 95 | Performed by: ADVANCED PRACTICE MIDWIFE

## 2024-12-10 PROCEDURE — 99215 OFFICE O/P EST HI 40 MIN: CPT | Mod: 95 | Performed by: ADVANCED PRACTICE MIDWIFE

## 2024-12-10 ASSESSMENT — EDINBURGH POSTNATAL DEPRESSION SCALE (EPDS)
TOTAL SCORE: 8
I HAVE FELT SAD OR MISERABLE: NOT VERY OFTEN
I HAVE BEEN ANXIOUS OR WORRIED FOR NO GOOD REASON: YES, SOMETIMES
I HAVE LOOKED FORWARD WITH ENJOYMENT TO THINGS: AS MUCH AS I EVER DID
I HAVE BEEN SO UNHAPPY THAT I HAVE BEEN CRYING: NO, NEVER
THINGS HAVE BEEN GETTING ON TOP OF ME: NO, MOST OF THE TIME I HAVE COPED QUITE WELL
I HAVE BEEN ABLE TO LAUGH AND SEE THE FUNNY SIDE OF THINGS: AS MUCH AS I ALWAYS COULD
I HAVE FELT SCARED OR PANICKY FOR NO GOOD REASON: YES, SOMETIMES
I HAVE BLAMED MYSELF UNNECESSARILY WHEN THINGS WENT WRONG: YES, SOME OF THE TIME
THE THOUGHT OF HARMING MYSELF HAS OCCURRED TO ME: NEVER
I HAVE BEEN SO UNHAPPY THAT I HAVE HAD DIFFICULTY SLEEPING: NOT AT ALL

## 2024-12-10 NOTE — Clinical Note
Dr. Matthew Massey:  I saw Kate Gallo, the mother of your patient Jana Gallo, by video visit for [facility] Outpatient Lactation services today.  It does sound like baby has had a concerning slowdown in weight gain, even bearing in mind that one weight was done at Urgent Care on a different scale.  I encouraged her to keep the cardiology appointment as you ordered, and made a follow up visit next with her to do a weighed feeding.  Hopefully this will clarify whether the baby is transferring adequate milk with breastfeeding or not and give some guidance. It is of course difficult to evaluate in a video visit. The chart is attached;  please see my note.  Please feel free to contact me with any questions.  I look forward to following this pleasant family along with you as needed.  Marta Mcintosh, YANET, CNM, IBCLC

## 2024-12-10 NOTE — PATIENT INSTRUCTIONS
Babies can sometimes become restless and fussy at breast due to fast milk flow. If this seems to be causing Jana to have difficulty staying latched to the breast or to cough/sputter while nursing, try using the laid-back or sidelying nursing positions.   You can also try taking baby off of breast when the strong milk letdown starts, and allow milk to flow out into burp cloth, re-latching baby after flow has slowed.   If Jana is very fussy and not accepting the second side, give her a break, as you have been doing.  Sometimes babies will take the second breast after a period of rest.  Alternate the side that you start with.  Once babies are about a month old, they need about 25-30 oz/day (or 2.5 - 3 oz each feeding if they eat about 10 times/day) and this where their needs stay for their entire first year;  you don't need to keep producing more and more milk as they grow.  If you feel that Jana is not nursing well and does not stay latched with good swallowing, then consider feeding by bottle--about 3 oz (90 ml) is a good amount to give.    Begin omeprazole as ordered by Dr. Castro.  If Jana is spitting up large amounts, some people find it helpful to wait 20 - 30 min before laying the baby down flat;  you can try this.  If you don't find it helpful, though, then you don't need to continue.  Keep cardiology appointment on Thursday;  if Jesicas weight gain issues are related to a cardiac concern, they will be able to advise you how to proceed.  Follow up with lactation next week, Dec 19 at 10:30 am at the [facility], for a weighted feeding.  This will help determine if Jana is taking enough milk when she is nursing, and we can make a plan for supplementation if needed.  It is not necessary to have a large store of milk before your return to work. Before returning to work all you need is one or two day's supply of milk (roughly 12 - 24 oz) to give to your childcare provider for the first day.   After  "that, baby will take what you have pumped for them during your previous workday.  If you begin pumping one extra time each day and storing about 2 oz, you will have more than you need ready for your return to work.   After your return to work, if it is difficult to collect enough milk during your workday to supply your baby, you can add in an additional pumping session in the mornings and/or on days off.   Building up a big stash of milk during your leave can actually cause problems, in addition to being time-consuming:   although regularly using milk from your \"stash\"  to provide for your baby can allow you to pump less during the workday, this less-frequent pumping can actually cause your milk supply decrease over time.     Follow up with pediatric provider and lactation as planned  [employer] can be used for brief questions, but it's important to know that messages are not seen Friday through Sunday. If urgent help is needed, Monday through Friday you can call [phone] and one of our lactation consultants will get the message and respond; if you need a rapid response over a weekend or holiday, it is best to call your on-call maternity or pediatric provider.  Please feel free to schedule a return visit if the concern is more detailed;  telephone visits are also an option if you don't feel you need to be seen in person.   All plan information conveyed to patient via [employer] as well as verbally.  ________________________      Video of sidelying breastfeeding  https://www.youtube.com/watch?v=EIT7avwEdqY     For good videos on the laid-back breastfeeding position:  [email or web address]  [email or web address]    _________________________________________      Returning to work    Returning to work is always a somewhat stressful time as you begin to leave your baby more regularly and rely more on your breast pump to remove milk during the workdays.  It can sometimes be challenging to pump the amount of " "milk needed to supply your baby at , maintain milk supply and feel confident your milk is being well-used.  Worries about supply can also be a vicious [place], because the more you think and feel anxious about about how much milk is being released, the more your letdown can be inhibited.  Some ideas for optimizing milk removal and supply when you return to work:    1.  Once babies are about a month old, they need about 25-30 oz/day (or 3- 4 oz each feeding if they eat about 8 times/day) and this where their needs stay for their entire first year;  you don't need to keep producing more and more milk as they grow.  Therefore, 3-4 oz is a good amount of milk to provide for your baby when you are away, although every feeding is different and babies do not always take the same amount each time.  Many  babies do not need as much milk is being offered to them, so check with your childcare provider to see how much milk is being warmed up vs. how much milk your baby is actually drinking.  Sometimes we discover that 5 oz were warmed up, the baby took 3 oz, and the extra 2 were discarded, which is a frustrating waste of your hard-won breastmilk.  This can also result in the childcare provider asking you to send more milk, which causes concerns about inadequate supply, when that is not actually the case.  Benigno prevent this, consider putting smaller amounts of milk in the bottles. If you want to leave, say, 15 oz of milk for the day, you could leave 5 bottles of 3 oz each rather than 3 bottles of 5 oz each, or send a few small bottles/bags of just one ounce, that can be used to \"top off\" if your baby seems to need more. Any bottles that are not used can then just be saved until the next day.      This technique can also work even if you discover that your baby has been taking the entire bottle--It's important to remember that when taking a bottle, a baby often doesn't have a lot of choice about whether or not to " finish a bottle.  If they are awake they have to keep swallowing while the nipple is in their mouth, unlike the breast, where they can control the flow. By using smaller amounts in the bottle, providers may find that your baby is actually satisfied after 3 oz rather than just continuing until the 5 oz is gone.  If baby takes the 3 oz and is still obviously hungry, then another bottle can be warmed, but if she is content, then the bottle is saved.   So those are a few thoughts on how to make the best use of the milk that you have.     2.  If you are struggling to pump enough milk for your childcare during the workday, consider pumping a little bit while you are at home.  If your baby does not always take both breasts, you could pump the breast that she is not nursing from while she is feeding from the other.  This is a great way to get a good letdown and get more milk, because we often have a better letdown to our baby than we do to the pump.   Some women add a pumping first thing in the morning before the baby has fed, because our breasts are often most full in the mornings.   Other women add a few pumpings over the weekends, just to have a bit extra to fill in.   It may not be feasible in your workday, but some women can squeeze in another pumping session at work. Another idea is to pump one breast at a time rather double pumping, and use the free hand to massage the breast being pumped--research has shown that that can significantly increase   the output of milk.  You can also do a little hand expression after the electric pumping and that has been shown can get some additional milk as well.       3. Finally, try to minimize the number of bottles your baby needs while you are gone.  Try nursing immediately before going to work, either at home right before going out the door, or even at the childcare if possible, and asking your childcare provider not to feed your baby for the last hour before your arrive back, so  that you can nurse again immediately upon your return.  Sometimes this can save on an entire bottle during the day.  For older babies, you can also have the childcare provider offer solid foods instead of a bottle feeding of milk.  This can decrease this amount of milk that is needed during the day, either by substituting the feeding of solids for a milk feeding, or by decreasing the amount of milk that is needed at that feeding.    ________________________    For excellent information on maintaining milk supply, see this article:     http://[email or web address].KlickEx/articles/tag/Magic+Number

## 2024-12-15 ENCOUNTER — NURSE TRIAGE (OUTPATIENT)
Dept: NURSING | Facility: CLINIC | Age: 34
End: 2024-12-15
Payer: COMMERCIAL

## 2024-12-16 NOTE — TELEPHONE ENCOUNTER
Nurse Triage SBAR    Is this a 2nd Level Triage? NO    Situation:  Medication question.    Background:  Per pt, she developed a cold with sore throat this AM, is breast feeding and would like to know what medications are safe to take for cough.     Assessment:  Pt denied any acute distress.     Protocol Recommended Disposition:   See More Appropriate Guideline, Home Care Pt is provided information from available resources. Pt is given protocol specific care advise and call back indications. Pt verbalized understanding and agreement with plan of care and no further questions at this time.     Recommendation:           Does the patient meet one of the following criteria for ADS visit consideration? 16+ years old, with an [facility] PCP     TIP  Providers, please consider if this condition is appropriate for management at one of our Acute and Diagnostic Services sites.     If patient is a good candidate, please use dotphrase <dot>triageresponse and select Refer to ADS to document.    Reason for Disposition   Cough   Breastfeeding questions about mother's medicines and diet   Maternal OTC medications, questions about    Additional Information   Negative: [1] Intentional drug overdose AND [2] suicidal thoughts or ideas   Negative: SEVERE difficulty breathing (e.g., struggling for each breath, speaks in single words)   Negative: Bluish (or gray) lips or face now   Negative: [1] Rapid onset of cough AND [2] has hives   Negative: Coughing started suddenly after medicine, an allergic food or bee sting   Negative: [1] Difficulty breathing AND [2] exposure to flames, smoke, or fumes   Negative: [1] Stridor AND [2] difficulty breathing   Negative: Sounds like a life-threatening emergency to the triager   Negative: [1] MODERATE difficulty breathing (e.g., speaks in phrases, SOB even at rest, pulse 100-120) AND [2] still present when not coughing   Negative: Chest pain  (Exception: MILD central chest pain, present only when  coughing.)   Negative: Patient sounds very sick or weak to the triager   Negative: [1] MILD difficulty breathing (e.g., minimal/no SOB at rest, SOB with walking, pulse <100) AND [2] still present when not coughing   Negative: [1] Coughed up blood AND [2] > 1 tablespoon (15 ml)   (Exception: Blood-tinged sputum.)   Negative: Fever > 103 F (39.4 C)   Negative: [1] Fever > 101 F (38.3 C) AND [2] age > 60 years   Negative: [1] Fever > 100.0 F (37.8 C) AND [2] bedridden (e.g., CVA, chronic illness, recovering from surgery)   Negative: [1] Fever > 100.0 F (37.8 C) AND [2] diabetes mellitus or weak immune system (e.g., HIV positive, cancer chemo, splenectomy, organ transplant, chronic steroids)   Negative: Wheezing is present   Negative: [1] Ankle swelling AND [2] swelling is increasing   Negative: SEVERE coughing spells (e.g., whooping sound after coughing, vomiting after coughing)   Negative: [1] Continuous (nonstop) coughing interferes with work or school AND [2] no improvement using cough treatment per Care Advice   Negative: Fever present > 3 days (72 hours)   Negative: [1] Fever returns after gone for over 24 hours AND [2] symptoms worse or not improved   Negative: [1] Using nasal washes and pain medicine > 24 hours AND [2] sinus pain (around cheekbone or eye) persists   Negative: Earache is present   Negative: Cough has been present for > 3 weeks   Negative: [1] Nasal discharge AND [2] present > 10 days   Negative: [1] Coughed up blood-tinged sputum AND [2] more than once   Negative: [1] Patient also has allergy symptoms (e.g., itchy eyes, clear nasal discharge, postnasal drip) AND [2] they are acting up   Negative: Taking an ACE Inhibitor medicine (e.g., benazepril / LOTENSIN, captopril / CAPOTEN, enalapril / VASOTEC, lisinopril / ZESTRIL)   Negative: Exposure to TB (Tuberculosis)    Protocols used: Medication Question Call-A-AH, Cough - Acute Non-Productive-A-AH, Breastfeeding - Mother's Medicines and  Diet-P-AH

## 2024-12-18 ENCOUNTER — VIRTUAL VISIT (OUTPATIENT)
Dept: BEHAVIORAL HEALTH | Facility: CLINIC | Age: 34
End: 2024-12-18
Payer: COMMERCIAL

## 2024-12-18 DIAGNOSIS — F43.22 ADJUSTMENT DISORDER WITH ANXIOUS MOOD: Primary | ICD-10-CM

## 2024-12-18 ASSESSMENT — ANXIETY QUESTIONNAIRES
IF YOU CHECKED OFF ANY PROBLEMS ON THIS QUESTIONNAIRE, HOW DIFFICULT HAVE THESE PROBLEMS MADE IT FOR YOU TO DO YOUR WORK, TAKE CARE OF THINGS AT HOME, OR GET ALONG WITH OTHER PEOPLE: SOMEWHAT DIFFICULT
4. TROUBLE RELAXING: MORE THAN HALF THE DAYS
5. BEING SO RESTLESS THAT IT IS HARD TO SIT STILL: SEVERAL DAYS
GAD7 TOTAL SCORE: 10
8. IF YOU CHECKED OFF ANY PROBLEMS, HOW DIFFICULT HAVE THESE MADE IT FOR YOU TO DO YOUR WORK, TAKE CARE OF THINGS AT HOME, OR GET ALONG WITH OTHER PEOPLE?: SOMEWHAT DIFFICULT
GAD7 TOTAL SCORE: 10
7. FEELING AFRAID AS IF SOMETHING AWFUL MIGHT HAPPEN: SEVERAL DAYS
7. FEELING AFRAID AS IF SOMETHING AWFUL MIGHT HAPPEN: SEVERAL DAYS
2. NOT BEING ABLE TO STOP OR CONTROL WORRYING: SEVERAL DAYS
3. WORRYING TOO MUCH ABOUT DIFFERENT THINGS: SEVERAL DAYS
6. BECOMING EASILY ANNOYED OR IRRITABLE: NEARLY EVERY DAY
1. FEELING NERVOUS, ANXIOUS, OR ON EDGE: SEVERAL DAYS
GAD7 TOTAL SCORE: 10

## 2024-12-18 ASSESSMENT — PATIENT HEALTH QUESTIONNAIRE - PHQ9
SUM OF ALL RESPONSES TO PHQ QUESTIONS 1-9: 6
SUM OF ALL RESPONSES TO PHQ QUESTIONS 1-9: 6
10. IF YOU CHECKED OFF ANY PROBLEMS, HOW DIFFICULT HAVE THESE PROBLEMS MADE IT FOR YOU TO DO YOUR WORK, TAKE CARE OF THINGS AT HOME, OR GET ALONG WITH OTHER PEOPLE: SOMEWHAT DIFFICULT

## 2024-12-18 NOTE — PATIENT INSTRUCTIONS
Macy Safety Plan      Creation Date: 12/18/24       Step 1: Warning signs:    Warning Signs    Not changing diaper    Setting baby down more often when crying    Yelling reactions      Step 2: Internal coping strategies - Things I can do to take my mind off my problems without contacting another person:    Strategies    Setting child down and walking away    Deep breathing    Getting space to regulate emotions      Step 3: People and social settings that provide distraction:    Name Contact Information     lives with Pt       Places    Bedroom for emotion regulation and breathing      Step 4: People whom I can ask for help during a crisis:    Name Contact Information     Has numbers in phone    Mother Has number sin phone      Step 5: Professionals or agencies I can contact during a crisis:    Clinician/Agency Name Phone Emergency Contact    [facility] [phone] Crisis Workers      Local Emergency Department Emergency Department Address Emergency Department Phone    [address] [place] [address] [phone]      Suicide Prevention Lifeline Phone: Call or Text 153  Crisis Text Line: Text HOME to 053501     Step 6: Making the environment safer (plan for lethal means safety):   Did not identify any lethal methods     Optional: What is most important to me and worth living for?:      Luiz-Prabhjot Safety Plan. Martha Dee and Dhiraj Rick. Used with permission of the authors.             [employer] for Moms and Dads      https://www.postpartum.net/

## 2024-12-18 NOTE — PROGRESS NOTES
[facility]: Integrated Behavioral Health    Integrated Behavioral Health   Mental Health & Addiction Services      Progress Note - Initial [facility] Visit     Patient Name: Kate Gallo    Date: 2024  Service Type: Individual   Visit Start Time: 1:21 PM  Visit End Time:   143 PM    Attendees: Patient   Service Modality: Video Visit:      Provider verified identity through the following two step process.  Patient provided:  Patient  and Patient address    Telemedicine Visit: The patient's condition can be safely assessed and treated via synchronous audio and visual telemedicine encounter.      Reason for Telemedicine Visit: Patient has requested telehealth visit    Originating Site (Patient Location): Patient's home    Distant Site (Provider Location): [facility]: [place]    Consent:  The patient/guardian has verbally consented to: the potential risks and benefits of telemedicine (video visit) versus in person care; bill my insurance or make self-payment for services provided; and responsibility for payment of non-covered services.     Patient would like the video invitation sent by:  My Chart    Mode of Communication:  Video Conference via [facility]    Distant Location (Provider):  On-site    As the provider I attest to compliance with applicable laws and regulations related to telemedicine.     [facility] Visit Activities (Refresh list every visit): NEW         DATA:     Interactive Complexity: No   Crisis: No     Assessments completed prior to this visit:     PHQ9:       2024    10:50 AM 2024     8:33 AM 2024     9:57 AM   PHQ-9 SCORE   PHQ-9 Total Score MyChart 5 (Mild depression) 12 (Moderate depression) 6 (Mild depression)   PHQ-9 Total Score 5 12 6        Patient-reported     GAD7:       2022    10:19 AM 2024    12:56 PM   SHAHEED-7 SCORE   Total Score 4 (minimal anxiety) 10 (moderate anxiety)   Total Score 4 10         Patient-reported     CAGE-AID:       2/22/2024    11:11 AM   CAGE-AID Total Score   Total Score 0   Total Score MyChart 0 (A total score of 2 or greater is considered clinically significant)     PROMIS 10-Global Health (all questions and answers displayed):       2/22/2024    11:10 AM 12/18/2024    10:18 AM   PROMIS 10   In general, would you say your health is: Good Good   In general, would you say your quality of life is: Good Very good   In general, how would you rate your physical health? Fair Good   In general, how would you rate your mental health, including your mood and your ability to think? Fair Fair   In general, how would you rate your satisfaction with your social activities and relationships? Very good Fair   In general, please rate how well you carry out your usual social activities and roles Good Fair   To what extent are you able to carry out your everyday physical activities such as walking, climbing stairs, carrying groceries, or moving a chair? Mostly Mostly   In the past 7 days, how often have you been bothered by emotional problems such as feeling anxious, depressed, or irritable? Sometimes Often   In the past 7 days, how would you rate your fatigue on average? Moderate Severe   In the past 7 days, how would you rate your pain on average, where 0 means no pain, and 10 means worst imaginable pain? 4 2   In general, would you say your health is: 3  3    In general, would you say your quality of life is: 3  4    In general, how would you rate your physical health? 2  3    In general, how would you rate your mental health, including your mood and your ability to think? 2  2    In general, how would you rate your satisfaction with your social activities and relationships? 4  2    In general, please rate how well you carry out your usual social activities and roles. (This includes activities at home, at work and in your community, and responsibilities as a parent, child, spouse, employee, friend,  etc.) 3  2    To what extent are you able to carry out your everyday physical activities such as walking, climbing stairs, carrying groceries, or moving a chair? 4  4    In the past 7 days, how often have you been bothered by emotional problems such as feeling anxious, depressed, or irritable? 3  4    In the past 7 days, how would you rate your fatigue on average? 3  4    In the past 7 days, how would you rate your pain on average, where 0 means no pain, and 10 means worst imaginable pain? 4  2    Global Mental Health Score 12 10    Global Physical Health Score 12 13    PROMIS TOTAL - SUBSCORES 24 23        Patient-reported     [place] Suicide Severity Rating Scale (Lifetime/Recent)      2/28/2024     2:00 PM 10/2/2024     5:00 AM   [place] Suicide Severity Rating (Lifetime/Recent)   Q1 Wished to be Dead (Past Month)  0-->no   Q2 Suicidal Thoughts (Past Month)  0-->no   Q6 Suicide Behavior (Lifetime)  0-->no   Level of Risk per Screen  no risks indicated   Q1 Wish to be Dead (Lifetime) N    Q2 Non-Specific Active Suicidal Thoughts (Lifetime) N    Actual Attempt (Lifetime) N    Has subject engaged in non-suicidal self-injurious behavior? (Lifetime) N    Interrupted Attempts (Lifetime) N    Aborted or Self-Interrupted Attempt (Lifetime) N    Preparatory Acts or Behavior (Lifetime) N    Calculated C-SSRS Risk Score (Lifetime/Recent) No Risk Indicated         Referral:   Patient was referred to [facility] by  Dr Toni [facility].    Reason for referral: clarify behavioral health diagnosis and determine behavioral health treatment options.      [facility] introduced self and role. Discussed informed consent and limits to confidentiality.     Presenting Concerns/ Current Stressors:   Pt has reported increased mood lability and emotion responses post partum that are uncharacteristic and distressing. Pt reports these emotions occur most often related to Pt's new born crying and Pt gets support from  when  overwhelmed. Pt has no thoughts of harming the infant and is not concerned for infant's safety. Pt gets down on self for having thoughts and emotions that are irregular for Pt.      Therapeutic Interventions:  Psycho-education: Provided psycho-education about patient's behavioral health condition and symptoms. Provided support and education to family members of patient.  Safety: Co-developed safety plan and patient agrees to follow.    Response to treatment interventions:   Patient was receptive to interventions utilized.  Patient was engaged in the therapy process.   Patient made a plan for changes in the next week.    Safety Issues and Plan for Safety and Risk Management:     Patient denies a history of suicidal ideation, suicide attempts, self-injurious behavior, homicidal ideation, homicidal behavior, and and other safety concerns   Patient denies current fears or concerns for personal safety.   Patient denies current or recent suicidal ideation or behaviors.   Patient denies current or recent homicidal ideation or behaviors.   Patient denies current or recent self injurious behavior or ideation.   Patient reports other safety concerns including Pt is experiencing labile mood in relation to post partum. Safety plan developed in the event mood symptoms worsened.   A safety and risk management plan has been developed including: Patient consented to co-developed safety plan.  Safety and risk management plan was completed - see below.  Patient agreed to use safety plan should any safety concerns arise.  A copy was given to the patient.   Patient reports there are no firearms in the house.       ASSESSMENT:   Mental Status:     Appearance:   Appropriate    Eye Contact:   Good    Psychomotor Behavior: Normal    Attitude:   Cooperative  Friendly Pleasant   Orientation:   All   Speech Rate / Production: Normal/ Responsive   Volume:   Normal    Mood:    Normal   Affect:    Appropriate    Thought Content:  Clear     Thought Form:  Goal Directed  Logical    Insight:    Good         Diagnostic Criteria:   Adjustment Disorder  A. The development of emotional or behavioral symptoms in response to an identifiable stressor(s) occurring within 3 months of the onset of the stressor(s)  B. These symptoms or behaviors are clinically significant, as evidenced by one or both of the following:       - Marked distress that is out of proportion to the severity/intensity of the stressor (with consideration for external context & culture)       - Significant impairment in social, occupational, or other important areas of functioning  C. The stress-related disturbance does not meet criteria for another disorder & is not not an exacerbation of another mental disorder  D. The symptoms do not represent normal bereavement  E. Once the stressor or its consequences have terminated, the symptoms do not persist for more than an additional 6 months       * Adjustment Disorder with Anxiety: The predominant manfestations are symptoms such as nervousness, worry, or jitteriness, or, in children separation anxiety from major attachment figures        DSM5 Diagnoses: (Sustained by DSM5 Criteria Listed Above)     Diagnoses: Adjustment Disorders  309.24 (F43.22) With anxiety     Psychosocial / Contextual Factors: Medical Complexities, Caregiver, Parent/child dynamics, and Postpartum Mood Lability        Collateral Reports Completed:   Routed note to PCP        PLAN: (Homework, other):     1. Patient was provided:  recommendation to schedule follow-up with [facility] recommendation to follow through on referrals information about mental health symptoms and treatment options      2. Provider recommended the following referrals: Individual Therapy [facility] and [employer] Support and Resources.        3. Suicide Risk and Safety Concerns were assessed for Kate Gallo    Safety Plan:   Patient agreed to follow safety plan   Macy  Safety Plan      Creation Date: 12/18/24       Step 1: Warning signs:    Warning Signs    Not changing diaper    Setting baby down more often when crying    Yelling reactions      Step 2: Internal coping strategies - Things I can do to take my mind off my problems without contacting another person:    Strategies    Setting child down and walking away    Deep breathing    Getting space to regulate emotions      Step 3: People and social settings that provide distraction:    Name Contact Information     lives with Pt       Places    Bedroom for emotion regulation and breathing      Step 4: People whom I can ask for help during a crisis:    Name Contact Information     Has numbers in phone    Mother Has number sin phone      Step 5: Professionals or agencies I can contact during a crisis:    Clinician/Agency Name Phone Emergency Contact    [facility] [phone] Crisis Workers      Local Emergency Department Emergency Department Address Emergency Department Phone    [address] [place] [address] [phone]      Suicide Prevention Lifeline Phone: Call or Text 319  Crisis Text Line: Text HOME to 320614     Step 6: Making the environment safer (plan for lethal means safety):   Did not identify any lethal methods     Optional: What is most important to me and worth living for?:      Macy Safety Plan. Martha Dee and Dhiraj Rick. Used with permission of the authors.            Bucky Paredes LMFT, [facility]   December 18, 2024    Answers submitted by the patient for this visit:  Patient Health Questionnaire (Submitted on 12/18/2024)  If you checked off any problems, how difficult have these problems made it for you to do your work, take care of things at home, or get along with other people?: Somewhat difficult  PHQ9 TOTAL SCORE: 6  Patient Health Questionnaire (G7) (Submitted on 12/18/2024)  SHAHEED 7 TOTAL SCORE: 10
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
## 2025-01-02 ENCOUNTER — OFFICE VISIT (OUTPATIENT)
Dept: MIDWIFE SERVICES | Facility: CLINIC | Age: 35
End: 2025-01-02
Payer: COMMERCIAL

## 2025-01-02 VITALS — HEART RATE: 72 BPM | DIASTOLIC BLOOD PRESSURE: 76 MMHG | SYSTOLIC BLOOD PRESSURE: 126 MMHG

## 2025-01-02 DIAGNOSIS — O92.79 OTHER DISORDERS OF LACTATION: Primary | ICD-10-CM

## 2025-01-02 ASSESSMENT — EDINBURGH POSTNATAL DEPRESSION SCALE (EPDS)
I HAVE BLAMED MYSELF UNNECESSARILY WHEN THINGS WENT WRONG: YES, SOME OF THE TIME
I HAVE BEEN SO UNHAPPY THAT I HAVE BEEN CRYING: NO, NEVER
I HAVE BEEN ANXIOUS OR WORRIED FOR NO GOOD REASON: YES, SOMETIMES
THINGS HAVE BEEN GETTING ON TOP OF ME: YES, SOMETIMES I HAVEN'T BEEN COPING AS WELL AS USUAL
I HAVE BEEN SO UNHAPPY THAT I HAVE HAD DIFFICULTY SLEEPING: NOT AT ALL
I HAVE BEEN ABLE TO LAUGH AND SEE THE FUNNY SIDE OF THINGS: AS MUCH AS I ALWAYS COULD
THE THOUGHT OF HARMING MYSELF HAS OCCURRED TO ME: NEVER
I HAVE LOOKED FORWARD WITH ENJOYMENT TO THINGS: AS MUCH AS I EVER DID
TOTAL SCORE: 9
I HAVE FELT SAD OR MISERABLE: NOT VERY OFTEN
I HAVE FELT SCARED OR PANICKY FOR NO GOOD REASON: YES, SOMETIMES

## 2025-01-02 NOTE — PATIENT INSTRUCTIONS
"   Continue to nurse baby on cue, 8-12 times each day.  Once swallowing slows, use breast compression to encourage more intake, but once baby is sleeping, coming off the breast, or just \"nibbling,\" you can take baby off the breast and move to the other side.  Offer both breasts at each feeding, but if Jana is satisfied with one side, simply begin with the rivas side at the next feeding.  Jana would be expected to need about 25  - 30 oz of milk each day to grow well and she is likely taking in much more than this, based on her milk transfer today and the amount of milk she takes in bottles at [facility].  Your milk supply is adequate to her needs--formula would not normally be needed if you are able to pump 400 ml over 3 pumping sessions.  Discuss Jana's feeding and weight patterns with your pediatrician to see if there is any other workup they would like to do.   Follow up with lactation as needed, and pediatric provider as planned.  [employer] can be used for brief questions, but it's important to know that messages are not seen Friday through Sunday. If urgent help is needed, Monday through Friday you can call [phone] and one of our lactation consultants will get the message and respond; if you need a rapid response over a weekend or holiday, it is best to call your on-call maternity or pediatric provider.  Please feel free to schedule a return visit if the concern is more detailed;  telephone visits are also an option if you don't feel you need to be seen in person.   "

## 2025-01-09 ENCOUNTER — MYC MEDICAL ADVICE (OUTPATIENT)
Dept: OBGYN | Facility: CLINIC | Age: 35
End: 2025-01-09
Payer: COMMERCIAL

## 2025-01-10 NOTE — TELEPHONE ENCOUNTER
Pt send mychart with concerns of sutures coming through her C/S incision.     Had C/S on 10/2/24    Pt states she can see and feel what she believes are suture coming through at her incision site. Per pt they are tan colored, thick and rigid. Gets caught on things at times. Has a odd sensation when this happens.    Denies any symptoms of infection at this time.   Advised on reasons to notify clinic for immediate evaluation such as new drainage or opening of incision.     There are no appts with any provider this week in clinic. Could she be added to your schedule so you could examine?    Please advise.    Sarai FERNANDEZ RN  OB/GYN [place]

## 2025-01-11 NOTE — TELEPHONE ENCOUNTER
I would be happy to look at the incision.  She can be added to my schedule.  3 months out I doubt suture material is present

## 2025-01-13 ASSESSMENT — ANXIETY QUESTIONNAIRES
8. IF YOU CHECKED OFF ANY PROBLEMS, HOW DIFFICULT HAVE THESE MADE IT FOR YOU TO DO YOUR WORK, TAKE CARE OF THINGS AT HOME, OR GET ALONG WITH OTHER PEOPLE?: SOMEWHAT DIFFICULT
3. WORRYING TOO MUCH ABOUT DIFFERENT THINGS: MORE THAN HALF THE DAYS
5. BEING SO RESTLESS THAT IT IS HARD TO SIT STILL: NOT AT ALL
1. FEELING NERVOUS, ANXIOUS, OR ON EDGE: NEARLY EVERY DAY
6. BECOMING EASILY ANNOYED OR IRRITABLE: MORE THAN HALF THE DAYS
IF YOU CHECKED OFF ANY PROBLEMS ON THIS QUESTIONNAIRE, HOW DIFFICULT HAVE THESE PROBLEMS MADE IT FOR YOU TO DO YOUR WORK, TAKE CARE OF THINGS AT HOME, OR GET ALONG WITH OTHER PEOPLE: SOMEWHAT DIFFICULT
GAD7 TOTAL SCORE: 11
GAD7 TOTAL SCORE: 11
4. TROUBLE RELAXING: SEVERAL DAYS
7. FEELING AFRAID AS IF SOMETHING AWFUL MIGHT HAPPEN: MORE THAN HALF THE DAYS
GAD7 TOTAL SCORE: 11
2. NOT BEING ABLE TO STOP OR CONTROL WORRYING: SEVERAL DAYS
7. FEELING AFRAID AS IF SOMETHING AWFUL MIGHT HAPPEN: MORE THAN HALF THE DAYS

## 2025-01-13 ASSESSMENT — PATIENT HEALTH QUESTIONNAIRE - PHQ9
SUM OF ALL RESPONSES TO PHQ QUESTIONS 1-9: 4
SUM OF ALL RESPONSES TO PHQ QUESTIONS 1-9: 4
10. IF YOU CHECKED OFF ANY PROBLEMS, HOW DIFFICULT HAVE THESE PROBLEMS MADE IT FOR YOU TO DO YOUR WORK, TAKE CARE OF THINGS AT HOME, OR GET ALONG WITH OTHER PEOPLE: SOMEWHAT DIFFICULT

## 2025-01-13 NOTE — TELEPHONE ENCOUNTER
Added to on call day 1/16.    Sarai FERNANDEZ RN  OB/GYN [place]     Acute respiratory failure as a result of Influenza with superimposed bacterial PNA  Continue broad spectrum antibiotics- on Cefepime 2gm q8hr total 7 days - last dose today   Sputum culture growing Pseudomonas +  MRSA/MSSA nares PCR negative  Legionella negative   s/p  high Flow   4lit nc 95  Continue bronchodilators   ID consult dr boateng Acute respiratory failure as a result of Influenza with superimposed bacterial PNA  - Sputum culture growing Pseudomonas +  - MRSA/MSSA nares PCR negative  - Legionella negative   - Now on 3L O2 NC  - Cefepime 2gm q8hr - last dose today 1/18  - ID Dr. Barrera following, recs appreciated

## 2025-01-14 ENCOUNTER — VIRTUAL VISIT (OUTPATIENT)
Dept: PSYCHOLOGY | Facility: CLINIC | Age: 35
End: 2025-01-14
Attending: MARRIAGE & FAMILY THERAPIST
Payer: COMMERCIAL

## 2025-01-14 DIAGNOSIS — F32.9 MAJOR DEPRESSIVE DISORDER, SINGLE EPISODE WITH PERIPARTUM ONSET: Primary | ICD-10-CM

## 2025-01-14 DIAGNOSIS — F41.8 POSTPARTUM ANXIETY: ICD-10-CM

## 2025-01-14 PROCEDURE — 90791 PSYCH DIAGNOSTIC EVALUATION: CPT | Mod: 95
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
## 2025-01-23 ENCOUNTER — VIRTUAL VISIT (OUTPATIENT)
Dept: PSYCHOLOGY | Facility: CLINIC | Age: 35
End: 2025-01-23
Payer: COMMERCIAL

## 2025-01-23 DIAGNOSIS — F32.9 MAJOR DEPRESSIVE DISORDER, SINGLE EPISODE WITH PERIPARTUM ONSET: Primary | ICD-10-CM

## 2025-01-23 DIAGNOSIS — F41.8 POSTPARTUM ANXIETY: ICD-10-CM

## 2025-01-23 NOTE — PROGRESS NOTES
[facility]                                     Progress Note    Patient Name: Kate Gallo  Date: 1/23/2025         Service Type: Individual      Session Start Time: 8:05  Session End Time: 9:00     Session Length: 55    Session #: 2    Attendees: Client attended alone    Service Modality:  Video Visit:      Provider verified identity through the following two step process.  Patient provided:  Patient is known previously to provider and Patient was verified at admission/transfer    Telemedicine Visit: The patient's condition can be safely assessed and treated via synchronous audio and visual telemedicine encounter.      Reason for Telemedicine Visit: Patient has requested telehealth visit    Originating Site (Patient Location): Patient's place of employment    Distant Site (Provider Location): Provider Remote Setting- Home Office    Consent:  The patient/guardian has verbally consented to: the potential risks and benefits of telemedicine (video visit) versus in person care; bill my insurance or make self-payment for services provided; and responsibility for payment of non-covered services.     Patient would like the video invitation sent by:  My Chart    Mode of Communication:  Video Conference via [facility]    Distant Location (Provider):  Off-site    As the provider I attest to compliance with applicable laws and regulations related to telemedicine.    DATA  Extended Session (53+ minutes): PROLONGED SERVICE IN THE OUTPATIENT SETTING REQUIRING DIRECT (FACE-TO-FACE) PATIENT CONTACT BEYOND THE USUAL SERVICE:    - Patient's presenting concerns require more intensive intervention than could be completed within the usual service  Provider utilized clinical judgment in determining that, given topics discussed in session, it was most clinically beneficial to the patient to have a lengthier session to adequately provide closure to the session    Interactive Complexity: No  Crisis: No  "        Progress Since Last Session (Related to Symptoms / Goals / Homework):   Symptoms:  initial visit    Homework: Completed in session      Episode of Care Goals: Minimal progress - PREPARATION (Decided to change - considering how); Intervened by negotiating a change plan and determining options / strategies for behavior change, identifying triggers, exploring social supports, and working towards setting a date to begin behavior change     Current / Ongoing Stressors and Concerns:   According to Yuriy Paredes, LMFT on 12/18/2024 \" Presenting Concerns/ Current Stressors: Pt has reported increased mood lability and emotion responses post partum that are uncharacteristic and distressing. Pt reports these emotions occur most often related to Pt's new born crying and Pt gets support from  when overwhelmed. Pt has no thoughts of harming the infant and is not concerned for infant's safety. Pt gets down on self for having thoughts and emotions that are irregular for Pt.\"     Treatment Objective(s) Addressed in This Session:   use cognitive strategies identified in therapy to challenge anxious thoughts  Treatment planning  Continue rapport building     Intervention:  Introduce the SMART Goal    Specific (S):   Develop and consistently use tools to effectively cope with the stressors of caring for a new baby, including managing sleep deprivation, balancing responsibilities, and addressing emotional challenges.    Measurement (M):  Be able to put the baby down more frequently and step away when feeling overwhelmed.  Spouse observes and notes changes in mood and behavior, as well as the duration of time spent away after putting the baby down.    Achievable (A):  I will actively use coping tools in the moment when I feel overwhelmed, and until I fully integrate those tools, I will put the baby down safely and step away to use them as needed.    Realistic & Resourced (R):  I have already started therapy, am " attending a support group, and am exploring the possibility of postpartum classes to build additional coping skills and support. These resources will help me manage the stressors of caring for a new baby in a realistic and sustainable way.    Time-Limited (T):  I will reassess my progress in 8 weeks. During this time, I will attend one support session per week and read my baby book to enhance my knowledge and coping strategies.    Assessments completed prior to visit:  The following assessments were completed by patient for this visit:  PHQ2:       1/22/2025    10:56 PM 3/6/2024     1:03 PM 2/11/2024     6:27 PM 2/9/2024     3:29 PM 5/16/2023     2:52 PM 7/22/2022    10:28 AM 7/6/2021    11:06 AM   PHQ-2 ( 1999 [employer])   Q1: Little interest or pleasure in doing things 0 1 0 0 0 0 0   Q2: Feeling down, depressed or hopeless 1 0 1 1 0 1 0   PHQ-2 Score 1  1 1 1 0 1 0   PHQ-2 Total Score (12-17 Years)- Positive if 3 or more points; Administer PHQ-A if positive       0   Q1: Little interest or pleasure in doing things Not at all Several days Not at all Not at all  Not at all    Not at all    Q2: Feeling down, depressed or hopeless Several days Not at all Several days Several days  Several days    Several days    PHQ-2 Score 1 1 1 1  1    1        Patient-reported     PHQ9:       2/22/2024    10:50 AM 8/29/2024     8:33 AM 12/18/2024     9:57 AM 1/13/2025     9:24 AM   PHQ-9 SCORE   PHQ-9 Total Score MyChart 5 (Mild depression) 12 (Moderate depression) 6 (Mild depression) 4 (Minimal depression)   PHQ-9 Total Score 5 12 6  4        Patient-reported     GAD2:       2/22/2024    11:09 AM 1/13/2025     9:24 AM   SHAHEED-2   Feeling nervous, anxious, or on edge 1 3   Not being able to stop or control worrying 1 2   SHAHEED-2 Total Score 2 5        Patient-reported     GAD7:       11/28/2022    10:19 AM 12/18/2024    12:56 PM 1/13/2025     9:24 AM   SHAHEED-7 SCORE   Total Score 4 (minimal anxiety) 10 (moderate anxiety) 11 (moderate  anxiety)   Total Score 4 10  11        Patient-reported         ASSESSMENT: Current Emotional / Mental Status (status of significant symptoms):   Risk status (Self / Other harm or suicidal ideation)   Patient denies current fears or concerns for personal safety.   Patient denies current or recent suicidal ideation or behaviors.   Patient denies current or recent homicidal ideation or behaviors.   Patient denies current or recent self injurious behavior or ideation.   Patient denies other safety concerns.   Patient reports there has been no change in risk factors since their last session.     Patient reports there has been no change in protective factors since their last session.     Recommended that patient call 911 or go to the local ED should there be a change in any of these risk factors     Appearance:   Appropriate    Eye Contact:   Good    Psychomotor Behavior: Normal    Attitude:   Cooperative    Orientation:   All   Speech    Rate / Production: Normal     Volume:  Normal    Mood:    Normal   Affect:    Appropriate    Thought Content:  Clear    Thought Form:  Coherent  Logical    Insight:    Good      Medication Review:   No current psychiatric medications prescribed     Medication Compliance:   NA     Changes in Health Issues:   None reported     Chemical Use Review:   Substance Use: Chemical use reviewed, no active concerns identified      Tobacco Use: No current tobacco use.      Diagnosis:  1. Major depressive disorder, single episode with peripartum onset    2. Postpartum anxiety      Collateral Reports Completed:   Authorization for Release of Information Completed for verbal for [facility]    PLAN: (Patient Tasks / Therapist Tasks / Other)  HW: Therapist will send [facility] referral.  Patient will attend attend PSI support group on Sunday.   Patient will use grounding and breathing skill.  Therapist will send coping skills via [facility]                                                          ______________________________________________________________________    Individual Treatment Plan    Patient's Name: Kate Gallo  YOB: 1990    Date of Creation: 1/23/2025  Date Treatment Plan Last Reviewed/Revised: n/a    DSM5 Diagnoses: 296.21 (F32.0) Major Depressive Disorder, Single Episode, Mild _ and With peripartum onset  Psychosocial / Contextual Factors: Experiences postpartum complications, has a supportive spouse, and both are navigating parenthood as first-time parents.   PROMIS (reviewed every 90 days):   PROMIS-10 Scores        2/22/2024    11:10 AM 12/18/2024    10:18 AM 1/13/2025     9:25 AM   PROMIS-10 Total Score w/o Sub Scores   PROMIS TOTAL - SUBSCORES 24 23  24        Patient-reported      Referral / Collaboration:  The following referral(s) will be initiated: Mother baby program and patient will pursue PSI support group .    Anticipated number of session for this episode of care: 9-12 sessions  Anticipation frequency of session: Weekly  Anticipated Duration of each session: 38-52 minutes  Treatment plan will be reviewed in 90 days or when goals have been changed.       MeasurableTreatment Goal(s) related to diagnosis / functional impairment(s)  Goal 1: The client will develop and consistently use effective coping tools to manage stress related to caring for a new baby, focusing on managing sleep deprivation, balancing responsibilities, and handling overwhelming emotions.    I will know I've met my goal when I'm spending less time away from my baby.      Objective #A (Patient Action)    The client will practice and implement the 5-4-3-2-1 grounding technique at least three times per week when feeling overwhelmed, documenting each instance and its effectiveness in a journal or notes for review.  Status: New - Date: 1/23/2025      Intervention(s)  The therapist will teach and role-play the 5-4-3-2-1 grounding technique in session, providing guidance on  its application in stressful moments. The therapist will also assist the client in identifying patterns of overwhelm through discussion and provide a journal template to track progress and reflect on its use between sessions.    Patient has reviewed and agreed to the above plan.      Marilyn Chung, [facility]  January 23, 2025

## 2025-01-29 ENCOUNTER — VIRTUAL VISIT (OUTPATIENT)
Dept: PSYCHOLOGY | Facility: CLINIC | Age: 35
End: 2025-01-29
Payer: COMMERCIAL

## 2025-01-29 DIAGNOSIS — F32.9 MAJOR DEPRESSIVE DISORDER, SINGLE EPISODE WITH PERIPARTUM ONSET: Primary | ICD-10-CM

## 2025-01-29 DIAGNOSIS — F41.8 POSTPARTUM ANXIETY: ICD-10-CM

## 2025-01-29 PROCEDURE — 90837 PSYTX W PT 60 MINUTES: CPT | Mod: 95

## 2025-01-29 NOTE — PROGRESS NOTES
[facility]                                     Progress Note    Patient Name: Kate Gallo  Date: 1/29/2025         Service Type: Individual      Session Start Time: 9:00  Session End Time: 10:00     Session Length: 60    Session #: 3    Attendees: Client attended alone    Service Modality:  Video Visit:      Provider verified identity through the following two step process.  Patient provided:  Patient is known previously to provider and Patient was verified at admission/transfer    Telemedicine Visit: The patient's condition can be safely assessed and treated via synchronous audio and visual telemedicine encounter.      Reason for Telemedicine Visit: Patient has requested telehealth visit    Originating Site (Patient Location): Patient's home    Distant Site (Provider Location): Provider Remote Setting- Home Office    Consent:  The patient/guardian has verbally consented to: the potential risks and benefits of telemedicine (video visit) versus in person care; bill my insurance or make self-payment for services provided; and responsibility for payment of non-covered services.     Patient would like the video invitation sent by:  My Chart    Mode of Communication:  Video Conference via [facility]    Distant Location (Provider):  Off-site    As the provider I attest to compliance with applicable laws and regulations related to telemedicine.    DATA  Extended Session (53+ minutes): PROLONGED SERVICE IN THE OUTPATIENT SETTING REQUIRING DIRECT (FACE-TO-FACE) PATIENT CONTACT BEYOND THE USUAL SERVICE:    - Patient's presenting concerns require more intensive intervention than could be completed within the usual service  Provider utilized clinical judgment in determining that, given topics discussed in session, it was most clinically beneficial to the patient to have a lengthier session to adequately provide closure to the session    Interactive Complexity: No  Crisis: No         Progress Since  "Last Session (Related to Symptoms / Goals / Homework):   Symptoms: Improving per patient report    Homework: Achieved / completed to satisfaction      Episode of Care Goals: Minimal progress - ACTION (Actively working towards change); Intervened by reinforcing change plan / affirming steps taken     Current / Ongoing Stressors and Concerns:   According to Yuriy Paredes, LMFT on 12/18/2024 \" Presenting Concerns/ Current Stressors: Pt has reported increased mood lability and emotion responses post partum that are uncharacteristic and distressing. Pt reports these emotions occur most often related to Pt's new born crying and Pt gets support from  when overwhelmed. Pt has no thoughts of harming the infant and is not concerned for infant's safety. Pt gets down on self for having thoughts and emotions that are irregular for Pt.\"     Treatment Objective(s) Addressed in This Session:   use cognitive strategies identified in therapy to challenge anxious thoughts  Continue rapport building     Intervention:  CBT- Therapist introduced Jennifer Diane s Self-Compassion Model. Therapist taught Jennifer Diane's model including  teachings of self-compassion, as client is  experiencing guilt related to postpartum distress: Mindfulness, common humanity, self-kindness.        Assessments completed prior to visit:  The following assessments were completed by patient for this visit:  PHQ2:       1/29/2025     8:49 AM 1/22/2025    10:56 PM 3/6/2024     1:03 PM 2/11/2024     6:27 PM 2/9/2024     3:29 PM 5/16/2023     2:52 PM 7/22/2022    10:28 AM   PHQ-2 ( 1999 [employer])   Q1: Little interest or pleasure in doing things 0 0 1 0 0 0 0   Q2: Feeling down, depressed or hopeless 1 1 0 1 1 0 1   PHQ-2 Score 1  1  1 1 1 0 1   Q1: Little interest or pleasure in doing things Not at all Not at all Several days Not at all Not at all  Not at all    Not at all   Q2: Feeling down, depressed or hopeless Several days Several days Not at all " Several days Several days  Several days    Several days   PHQ-2 Score 1 1 1 1 1  1    1       Patient-reported     PHQ9:       2/22/2024    10:50 AM 8/29/2024     8:33 AM 12/18/2024     9:57 AM 1/13/2025     9:24 AM   PHQ-9 SCORE   PHQ-9 Total Score MyChart 5 (Mild depression) 12 (Moderate depression) 6 (Mild depression) 4 (Minimal depression)   PHQ-9 Total Score 5 12 6  4        Patient-reported     GAD2:       2/22/2024    11:09 AM 1/13/2025     9:24 AM   SHAHEED-2   Feeling nervous, anxious, or on edge 1 3   Not being able to stop or control worrying 1 2   SHAHEED-2 Total Score 2 5        Patient-reported     GAD7:       11/28/2022    10:19 AM 12/18/2024    12:56 PM 1/13/2025     9:24 AM   SHAHEED-7 SCORE   Total Score 4 (minimal anxiety) 10 (moderate anxiety) 11 (moderate anxiety)   Total Score 4 10  11        Patient-reported         ASSESSMENT: Current Emotional / Mental Status (status of significant symptoms):   Risk status (Self / Other harm or suicidal ideation)   Patient denies current fears or concerns for personal safety.   Patient denies current or recent suicidal ideation or behaviors.   Patient denies current or recent homicidal ideation or behaviors.   Patient denies current or recent self injurious behavior or ideation.   Patient denies other safety concerns.   Patient reports there has been no change in risk factors since their last session.     Patient reports there has been no change in protective factors since their last session.     Recommended that patient call 911 or go to the local ED should there be a change in any of these risk factors     Appearance:   Appropriate    Eye Contact:   Good    Psychomotor Behavior: Normal    Attitude:   Cooperative    Orientation:   All   Speech    Rate / Production: Normal     Volume:  Normal    Mood:    Normal   Affect:    Appropriate    Thought Content:  Clear    Thought Form:  Coherent  Logical    Insight:    Good      Medication Review:   No current psychiatric  medications prescribed     Medication Compliance:   NA     Changes in Health Issues:   None reported     Chemical Use Review:   Substance Use: Chemical use reviewed, no active concerns identified      Tobacco Use: No current tobacco use.      Diagnosis:  1. Major depressive disorder, single episode with peripartum onset    2. Postpartum anxiety        Collateral Reports Completed:   Authorization for Release of Information Completed for verbal for [facility]    PLAN: (Patient Tasks / Therapist Tasks / Other)  HW: Practice steps towards self compassion as discussed in session today with Roxi's model.     Update on [facility] referral.          Marilyn Chung, [facility]                                                         ______________________________________________________________________    Individual Treatment Plan    Patient's Name: Kate Gallo  YOB: 1990    Date of Creation: 1/23/2025  Date Treatment Plan Last Reviewed/Revised: n/a    DSM5 Diagnoses: 296.21 (F32.0) Major Depressive Disorder, Single Episode, Mild _ and With peripartum onset  Psychosocial / Contextual Factors: Experiences postpartum complications, has a supportive spouse, and both are navigating parenthood as first-time parents.   PROMIS (reviewed every 90 days):   PROMIS-10 Scores        2/22/2024    11:10 AM 12/18/2024    10:18 AM 1/13/2025     9:25 AM   PROMIS-10 Total Score w/o Sub Scores   PROMIS TOTAL - SUBSCORES 24 23  24        Patient-reported      Referral / Collaboration:  The following referral(s) will be initiated: Mother baby program and patient will pursue PSI support group .    Anticipated number of session for this episode of care: 9-12 sessions  Anticipation frequency of session: Weekly  Anticipated Duration of each session: 38-52 minutes  Treatment plan will be reviewed in 90 days or when goals have been changed.       MeasurableTreatment Goal(s) related to diagnosis / functional impairment(s)  Goal 1:  The client will develop and consistently use effective coping tools to manage stress related to caring for a new baby, focusing on managing sleep deprivation, balancing responsibilities, and handling overwhelming emotions.    I will know I've met my goal when I'm spending less time away from my baby.      Objective #A (Patient Action)    The client will practice and implement the 5-4-3-2-1 grounding technique at least three times per week when feeling overwhelmed, documenting each instance and its effectiveness in a journal or notes for review.  Status: New - Date: 1/23/2025      Intervention(s)  The therapist will teach and role-play the 5-4-3-2-1 grounding technique in session, providing guidance on its application in stressful moments. The therapist will also assist the client in identifying patterns of overwhelm through discussion and provide a journal template to track progress and reflect on its use between sessions.    Patient has reviewed and agreed to the above plan.      Marilyn Chung, Health system  January 23, 2025

## 2025-02-03 ENCOUNTER — MEDICAL CORRESPONDENCE (OUTPATIENT)
Dept: HEALTH INFORMATION MANAGEMENT | Facility: CLINIC | Age: 35
End: 2025-02-03
Payer: COMMERCIAL

## 2025-02-04 ENCOUNTER — VIRTUAL VISIT (OUTPATIENT)
Dept: PSYCHOLOGY | Facility: CLINIC | Age: 35
End: 2025-02-04
Payer: COMMERCIAL

## 2025-02-04 DIAGNOSIS — F32.9 MAJOR DEPRESSIVE DISORDER, SINGLE EPISODE WITH PERIPARTUM ONSET: Primary | ICD-10-CM

## 2025-02-04 DIAGNOSIS — F41.8 POSTPARTUM ANXIETY: ICD-10-CM

## 2025-02-04 PROCEDURE — 90834 PSYTX W PT 45 MINUTES: CPT | Mod: 95

## 2025-02-04 NOTE — PROGRESS NOTES
[facility]                                     Progress Note    Patient Name: Kate Gallo  Date: 2/4/2025         Service Type: Individual      Session Start Time: 8:13  Session End Time: 9:00     Session Length: 47    Session #: 4    Attendees: Client attended alone    Service Modality:  Video Visit:      Provider verified identity through the following two step process.  Patient provided:  Patient is known previously to provider and Patient was verified at admission/transfer    Telemedicine Visit: The patient's condition can be safely assessed and treated via synchronous audio and visual telemedicine encounter.      Reason for Telemedicine Visit: Patient has requested telehealth visit    Originating Site (Patient Location): Patient's home    Distant Site (Provider Location): Provider Remote Setting- Home Office    Consent:  The patient/guardian has verbally consented to: the potential risks and benefits of telemedicine (video visit) versus in person care; bill my insurance or make self-payment for services provided; and responsibility for payment of non-covered services.     Patient would like the video invitation sent by:  My Chart    Mode of Communication:  Video Conference via [facility]    Distant Location (Provider):  Off-site    As the provider I attest to compliance with applicable laws and regulations related to telemedicine.    DATA  Extended Session (53+ minutes): PROLONGED SERVICE IN THE OUTPATIENT SETTING REQUIRING DIRECT (FACE-TO-FACE) PATIENT CONTACT BEYOND THE USUAL SERVICE:    - Patient's presenting concerns require more intensive intervention than could be completed within the usual service  Provider utilized clinical judgment in determining that, given topics discussed in session, it was most clinically beneficial to the patient to have a lengthier session to adequately provide closure to the session    Interactive Complexity: No  Crisis: No         Progress Since Last  "Session (Related to Symptoms / Goals / Homework):   Symptoms:  pt reported intrusive thoughts upon leaving baby with grandparents over the weekend.    Homework: Achieved / completed to satisfaction      Episode of Care Goals: Minimal progress - ACTION (Actively working towards change); Intervened by reinforcing change plan / affirming steps taken     Current / Ongoing Stressors and Concerns:   2/04/25 Pt reported feeling guilty about leaving her baby for a temporary weekend getaway trip with spouse and friends.     According to Yuriy Paredes, LMFT on 12/18/2024 \" Presenting Concerns/ Current Stressors: Pt has reported increased mood lability and emotion responses post partum that are uncharacteristic and distressing. Pt reports these emotions occur most often related to Pt's new born crying and Pt gets support from  when overwhelmed. Pt has no thoughts of harming the infant and is not concerned for infant's safety. Pt gets down on self for having thoughts and emotions that are irregular for Pt.\"     Treatment Objective(s) Addressed in This Session:   use cognitive strategies identified in therapy to challenge anxious thoughts     Intervention:  CBT- Cognitive restructuring: Reframing Mom Guilt  Psychoeducation- Support systems: Encouraged talking to other mothers who have experienced similar thoughts and open communication with partners, family, or friends about the experience.     CBT- Therapist introduced Jennifer Diane s Self-Compassion Model. Therapist taught Jennifer Diane's model including  teachings of self-compassion, as client is  experiencing guilt related to postpartum distress: Mindfulness, common humanity, self-kindness.        Assessments completed prior to visit:  The following assessments were completed by patient for this visit:  PHQ2:       2/3/2025    10:30 PM 1/29/2025     8:49 AM 1/22/2025    10:56 PM 3/6/2024     1:03 PM 2/11/2024     6:27 PM 2/9/2024     3:29 PM 5/16/2023     2:52 PM "   PHQ-2 ( 1999 [employer])   Q1: Little interest or pleasure in doing things 0 0 0 1 0 0 0   Q2: Feeling down, depressed or hopeless 1 1 1 0 1 1 0   PHQ-2 Score 1  1  1  1 1 1 0   Q1: Little interest or pleasure in doing things Not at all Not at all Not at all Several days Not at all Not at all    Q2: Feeling down, depressed or hopeless Several days Several days Several days Not at all Several days Several days    PHQ-2 Score 1 1 1 1 1 1        Patient-reported     PHQ9:       2/22/2024    10:50 AM 8/29/2024     8:33 AM 12/18/2024     9:57 AM 1/13/2025     9:24 AM   PHQ-9 SCORE   PHQ-9 Total Score MyChart 5 (Mild depression) 12 (Moderate depression) 6 (Mild depression) 4 (Minimal depression)   PHQ-9 Total Score 5 12 6  4        Patient-reported     GAD2:       2/22/2024    11:09 AM 1/13/2025     9:24 AM   SHAHEED-2   Feeling nervous, anxious, or on edge 1 3   Not being able to stop or control worrying 1 2   SHAHEED-2 Total Score 2 5        Patient-reported     GAD7:       11/28/2022    10:19 AM 12/18/2024    12:56 PM 1/13/2025     9:24 AM   SHAHEED-7 SCORE   Total Score 4 (minimal anxiety) 10 (moderate anxiety) 11 (moderate anxiety)   Total Score 4 10  11        Patient-reported         ASSESSMENT: Current Emotional / Mental Status (status of significant symptoms):   Risk status (Self / Other harm or suicidal ideation)   Patient denies current fears or concerns for personal safety.   Patient denies current or recent suicidal ideation or behaviors.   Patient denies current or recent homicidal ideation or behaviors.   Patient denies current or recent self injurious behavior or ideation.   Patient denies other safety concerns.   Patient reports there has been no change in risk factors since their last session.     Patient reports there has been no change in protective factors since their last session.     Recommended that patient call 911 or go to the local ED should there be a change in any of these risk  factors     Appearance:   Appropriate    Eye Contact:   Good    Psychomotor Behavior: Normal    Attitude:   Cooperative    Orientation:   All   Speech    Rate / Production: Normal     Volume:  Normal    Mood:    Normal   Affect:    Appropriate    Thought Content:  Clear    Thought Form:  Coherent  Logical    Insight:    Good      Medication Review:   No current psychiatric medications prescribed     Medication Compliance:   NA     Changes in Health Issues:   None reported     Chemical Use Review:   Substance Use: Chemical use reviewed, no active concerns identified      Tobacco Use: No current tobacco use.      Diagnosis:  No diagnosis found.      Collateral Reports Completed:   Authorization for Release of Information Completed for verbal for [employer] [facility]    PLAN: (Patient Tasks / Therapist Tasks / Other)  HW: Continue to use and practice steps towards self compassion as discussed in session today with Roxi's model in other areas of life as indicated.             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   PROMIS (reviewed every 90 days):   PROMIS-10 Scores        2/22/2024    11:10 AM 12/18/2024    10:18 AM 1/13/2025     9:25 AM   PROMIS-10 Total Score w/o Sub Scores   PROMIS TOTAL - SUBSCORES 24 23  24        Patient-reported      Referral / Collaboration:  The following referral(s) will be initiated: Mother baby program and patient will pursue PSI support group  .    Anticipated number of session for this episode of care: 9-12 sessions  Anticipation frequency of session: Weekly  Anticipated Duration of each session: 38-52 minutes  Treatment plan will be reviewed in 90 days or when goals have been changed.       MeasurableTreatment Goal(s) related to diagnosis / functional impairment(s)  Goal 1: The client will develop and consistently use effective coping tools to manage stress related to caring for a new baby, focusing on managing sleep deprivation, balancing responsibilities, and handling overwhelming emotions.    I will know I've met my goal when I'm spending less time away from my baby.      Objective #A (Patient Action)    The client will practice and implement the 5-4-3-2-1 grounding technique at least three times per week when feeling overwhelmed, documenting each instance and its effectiveness in a journal or notes for review.  Status: New - Date: 1/23/2025      Intervention(s)  The therapist will teach and role-play the 5-4-3-2-1 grounding technique in session, providing guidance on its application in stressful moments. The therapist will also assist the client in identifying patterns of overwhelm through discussion and provide a journal template to track progress and reflect on its use between sessions.    Patient has reviewed and agreed to the above plan.      Marilyn Chung, [facility]  January 23, 2025

## 2025-02-05 ENCOUNTER — OFFICE VISIT (OUTPATIENT)
Dept: OBGYN | Facility: CLINIC | Age: 35
End: 2025-02-05
Payer: COMMERCIAL

## 2025-02-05 VITALS — SYSTOLIC BLOOD PRESSURE: 118 MMHG | WEIGHT: 157 LBS | DIASTOLIC BLOOD PRESSURE: 70 MMHG | BODY MASS INDEX: 28.72 KG/M2

## 2025-02-05 DIAGNOSIS — Z48.89 ENCOUNTER FOR POST SURGICAL WOUND CHECK: Primary | ICD-10-CM

## 2025-02-05 NOTE — PROGRESS NOTES
Chief Complaint   Patient presents with    Surgical Followup       Subjective:  34-year-old status post primary  section in 2024.  Presents today for an incision check as she felt there were some small areas where suture was showing.    She denies any drainage redness or swelling  REVIEW OF SYSTEMS:  General: as above    Health Maintenance   Topic Date Due    DEPRESSION ACTION PLAN  Never done    HPV IMMUNIZATION (3 - 3-dose series) 2017    COVID-19 Vaccine ( season) 2024    PAP FOLLOW-UP  2025    HPV FOLLOW-UP  2025    YEARLY PREVENTIVE VISIT  2025    PHQ-9  2025    GLUCOSE  10/03/2027    ADVANCE CARE PLANNING  2029    DTAP/TDAP/TD IMMUNIZATION (9 - Td or Tdap) 2034    ZOSTER IMMUNIZATION (1 of 2) 2042    INFLUENZA VACCINE  Completed    HEPATITIS B IMMUNIZATION  Completed    Pneumococcal Vaccine: Pediatrics (0 to 5 Years) and At-Risk Patients (6 to 49 Years)  Aged Out    MENINGITIS IMMUNIZATION  Aged Out    HEPATITIS C SCREENING  Discontinued    HIV SCREENING  Discontinued    PAP  Discontinued    RSV VACCINE  Discontinued       Allergies   Allergen Reactions    Codeine Shortness Of Breath, Nausea and Vomiting and Rash    Doxycycline Dizziness and Rash       Objective:  Vitals: /70   Wt 71.2 kg (157 lb)   Breastfeeding Yes   BMI 28.72 kg/m    BMI= Body mass index is 28.72 kg/m .    Pfannenstiel scar is intact.  No suture material is showing.  Patient was reassured that normal healing has taken place  Assessment/Plan:  Normal postop wound check from a  in 2024.  Follow-up as needed    Jorge Muñoz MD

## 2025-02-05 NOTE — NURSING NOTE
"Chief Complaint   Patient presents with    Surgical Followup     initial /70   Wt 71.2 kg (157 lb)   Breastfeeding Yes   BMI 28.72 kg/m   Estimated body mass index is 28.72 kg/m  as calculated from the following:    Height as of 5/28/24: 1.575 m (5' 2\").    Weight as of this encounter: 71.2 kg (157 lb).  BP completed using cuff size regular    Cata Remy CMA on 2/5/2025 at 10:26 AM    "

## 2025-02-14 PROBLEM — D06.9 CIN III WITH SEVERE DYSPLASIA: Status: ACTIVE | Noted: 2022-08-01

## 2025-02-18 ENCOUNTER — VIRTUAL VISIT (OUTPATIENT)
Facility: CLINIC | Age: 35
End: 2025-02-18
Payer: COMMERCIAL

## 2025-02-18 DIAGNOSIS — F32.9 MAJOR DEPRESSIVE DISORDER, SINGLE EPISODE WITH PERIPARTUM ONSET: Primary | ICD-10-CM

## 2025-02-18 DIAGNOSIS — F41.8 POSTPARTUM ANXIETY: ICD-10-CM

## 2025-02-18 PROCEDURE — 90837 PSYTX W PT 60 MINUTES: CPT | Mod: 95

## 2025-02-18 NOTE — PROGRESS NOTES
[facility]                                     Progress Note    Patient Name: Kate Gallo  Date: 2/18/2025         Service Type: Individual      Session Start Time: 9:00  Session End Time: 9:53     Session Length: 53    Session #: 5    Attendees: Client attended alone    Service Modality:  Video Visit:      Provider verified identity through the following two step process.  Patient provided:  Patient is known previously to provider and Patient was verified at admission/transfer    Telemedicine Visit: The patient's condition can be safely assessed and treated via synchronous audio and visual telemedicine encounter.      Reason for Telemedicine Visit: Patient has requested telehealth visit    Originating Site (Patient Location): Patient's home    Distant Site (Provider Location): Provider Remote Setting- Home Office    Consent:  The patient/guardian has verbally consented to: the potential risks and benefits of telemedicine (video visit) versus in person care; bill my insurance or make self-payment for services provided; and responsibility for payment of non-covered services.     Patient would like the video invitation sent by:  My Chart    Mode of Communication:  Video Conference via [facility]    Distant Location (Provider):  Off-site    As the provider I attest to compliance with applicable laws and regulations related to telemedicine.    DATA  Extended Session (53+ minutes): PROLONGED SERVICE IN THE OUTPATIENT SETTING REQUIRING DIRECT (FACE-TO-FACE) PATIENT CONTACT BEYOND THE USUAL SERVICE:    - Patient's presenting concerns require more intensive intervention than could be completed within the usual service  Provider utilized clinical judgment in determining that, given topics discussed in session, it was most clinically beneficial to the patient to have a lengthier session to adequately provide closure to the session    Interactive Complexity: No  Crisis: No         Progress Since Last  "Session (Related to Symptoms / Goals / Homework):   Symptoms:  pt reported intrusive thoughts upon leaving baby with grandparents over the weekend.    Homework: Achieved / completed to satisfaction      Episode of Care Goals: Minimal progress - ACTION (Actively working towards change); Intervened by reinforcing change plan / affirming steps taken     Current / Ongoing Stressors and Concerns:   2/04/25 Pt reported feeling guilty about leaving her baby for a temporary weekend getaway trip with spouse and friends.     According to Yuriy Paredes, LMFT on 12/18/2024 \" Presenting Concerns/ Current Stressors: Pt has reported increased mood lability and emotion responses post partum that are uncharacteristic and distressing. Pt reports these emotions occur most often related to Pt's new born crying and Pt gets support from  when overwhelmed. Pt has no thoughts of harming the infant and is not concerned for infant's safety. Pt gets down on self for having thoughts and emotions that are irregular for Pt.\"     Treatment Objective(s) Addressed in This Session:   use cognitive strategies identified in therapy to challenge anxious thoughts     Intervention:  CBT - Psychoeducation: Therapist provided framework for understanding cognitive distortions to better understand how it can influence emotions.     CBT- Cognitive restructuring: Reframing Mom Guilt  Psychoeducation- Support systems: Encouraged talking to other mothers who have experienced similar thoughts and open communication with partners, family, or friends about the experience.     CBT- Therapist introduced Jennifer Diane s Self-Compassion Model. Therapist taught Jennifer Diane's model including  teachings of self-compassion, as client is  experiencing guilt related to postpartum distress: Mindfulness, common humanity, self-kindness.        Assessments completed prior to visit:  The following assessments were completed by patient for this visit:  PHQ2:       " 2/3/2025    10:30 PM 1/29/2025     8:49 AM 1/22/2025    10:56 PM 3/6/2024     1:03 PM 2/11/2024     6:27 PM 2/9/2024     3:29 PM 5/16/2023     2:52 PM   PHQ-2 ( 1999 [employer])   Q1: Little interest or pleasure in doing things 0 0 0 1 0 0 0   Q2: Feeling down, depressed or hopeless 1 1 1 0 1 1 0   PHQ-2 Score 1  1  1  1 1 1 0   Q1: Little interest or pleasure in doing things Not at all Not at all Not at all Several days Not at all Not at all    Q2: Feeling down, depressed or hopeless Several days Several days Several days Not at all Several days Several days    PHQ-2 Score 1 1 1 1 1 1        Patient-reported     PHQ9:       2/22/2024    10:50 AM 8/29/2024     8:33 AM 12/18/2024     9:57 AM 1/13/2025     9:24 AM   PHQ-9 SCORE   PHQ-9 Total Score MyChart 5 (Mild depression) 12 (Moderate depression) 6 (Mild depression) 4 (Minimal depression)   PHQ-9 Total Score 5 12 6  4        Patient-reported     GAD2:       2/22/2024    11:09 AM 1/13/2025     9:24 AM   SHAHEED-2   Feeling nervous, anxious, or on edge 1 3   Not being able to stop or control worrying 1 2   SHAHEED-2 Total Score 2 5        Patient-reported     GAD7:       11/28/2022    10:19 AM 12/18/2024    12:56 PM 1/13/2025     9:24 AM   SHAHEED-7 SCORE   Total Score 4 (minimal anxiety) 10 (moderate anxiety) 11 (moderate anxiety)   Total Score 4 10  11        Patient-reported         ASSESSMENT: Current Emotional / Mental Status (status of significant symptoms):   Risk status (Self / Other harm or suicidal ideation)   Patient denies current fears or concerns for personal safety.   Patient denies current or recent suicidal ideation or behaviors.   Patient denies current or recent homicidal ideation or behaviors.   Patient denies current or recent self injurious behavior or ideation.   Patient denies other safety concerns.   Patient reports there has been no change in risk factors since their last session.     Patient reports there has been no change in protective factors since  their last session.     Recommended that patient call 911 or go to the local ED should there be a change in any of these risk factors     Appearance:   Appropriate    Eye Contact:   Good    Psychomotor Behavior: Normal    Attitude:   Cooperative    Orientation:   All   Speech    Rate / Production: Normal     Volume:  Normal    Mood:    Normal   Affect:    Appropriate    Thought Content:  Clear    Thought Form:  Coherent  Logical    Insight:    Good      Medication Review:   No current psychiatric medications prescribed     Medication Compliance:   NA     Changes in Health Issues:   None reported     Chemical Use Review:   Substance Use: Chemical use reviewed, no active concerns identified      Tobacco Use: No current tobacco use.      Diagnosis:  1. Major depressive disorder, single episode with peripartum onset    2. Postpartum anxiety          Collateral Reports Completed:   Authorization for Release of Information Completed for verbal for [facility]    PLAN: (Patient Tasks / Therapist Tasks / Other)  HW: Review cognitive distortion list provided by therapist via "[employer]".             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   PROMIS (reviewed every 90 days):   PROMIS-10 Scores        2/22/2024    11:10 AM 12/18/2024    10:18 AM 1/13/2025     9:25 AM   PROMIS-10 Total Score w/o Sub Scores   PROMIS TOTAL - SUBSCORES 24 23  24        Patient-reported      Referral /  Collaboration:  The following referral(s) will be initiated: Mother baby program and patient will pursue PSI support group .    Anticipated number of session for this episode of care: 9-12 sessions  Anticipation frequency of session: Weekly  Anticipated Duration of each session: 38-52 minutes  Treatment plan will be reviewed in 90 days or when goals have been changed.       MeasurableTreatment Goal(s) related to diagnosis / functional impairment(s)  Goal 1: The client will develop and consistently use effective coping tools to manage stress related to caring for a new baby, focusing on managing sleep deprivation, balancing responsibilities, and handling overwhelming emotions.    I will know I've met my goal when I'm spending less time away from my baby.      Objective #A (Patient Action)    The client will practice and implement the 5-4-3-2-1 grounding technique at least three times per week when feeling overwhelmed, documenting each instance and its effectiveness in a journal or notes for review.  Status: New - Date: 1/23/2025      Intervention(s)  The therapist will teach and role-play the 5-4-3-2-1 grounding technique in session, providing guidance on its application in stressful moments. The therapist will also assist the client in identifying patterns of overwhelm through discussion and provide a journal template to track progress and reflect on its use between sessions.    Patient has reviewed and agreed to the above plan.      Marilyn Chung, [facility]  January 23, 2025

## 2025-03-04 ENCOUNTER — OFFICE VISIT (OUTPATIENT)
Dept: OBGYN | Facility: CLINIC | Age: 35
End: 2025-03-04
Payer: COMMERCIAL

## 2025-03-04 VITALS — DIASTOLIC BLOOD PRESSURE: 72 MMHG | WEIGHT: 162 LBS | SYSTOLIC BLOOD PRESSURE: 114 MMHG | BODY MASS INDEX: 29.63 KG/M2

## 2025-03-04 DIAGNOSIS — Z12.4 SCREENING FOR MALIGNANT NEOPLASM OF CERVIX: ICD-10-CM

## 2025-03-04 DIAGNOSIS — D06.9 CIN III WITH SEVERE DYSPLASIA: Primary | ICD-10-CM

## 2025-03-04 PROCEDURE — 3078F DIAST BP <80 MM HG: CPT | Performed by: OBSTETRICS & GYNECOLOGY

## 2025-03-04 PROCEDURE — 3074F SYST BP LT 130 MM HG: CPT | Performed by: OBSTETRICS & GYNECOLOGY

## 2025-03-04 PROCEDURE — 99212 OFFICE O/P EST SF 10 MIN: CPT | Performed by: OBSTETRICS & GYNECOLOGY

## 2025-03-04 NOTE — NURSING NOTE
"Chief Complaint   Patient presents with    Gyn Exam     initial /72   Wt 73.5 kg (162 lb)   LMP 02/05/2025 (Approximate)   Breastfeeding Yes   BMI 29.63 kg/m   Estimated body mass index is 29.63 kg/m  as calculated from the following:    Height as of 5/28/24: 1.575 m (5' 2\").    Weight as of this encounter: 73.5 kg (162 lb).  BP completed using cuff size regular    Cata Remy CMA on 3/4/2025 at 2:57 PM    "

## 2025-03-04 NOTE — PROGRESS NOTES
Chief Complaint   Patient presents with    Gyn Exam       Subjective:  34-year-old para 1 here for follow-up Pap.  She underwent a LEEP in November 2022 with KAELA 2-3 with negative margins and a benign ECC.  She is due for a follow-up Pap today.      REVIEW OF SYSTEMS:  General: as above    Health Maintenance   Topic Date Due    DEPRESSION ACTION PLAN  Never done    HPV IMMUNIZATION (3 - 3-dose series) 08/02/2017    COVID-19 Vaccine (5 - 2024-25 season) 09/01/2024    PAP FOLLOW-UP  03/07/2025    HPV FOLLOW-UP  03/07/2025    YEARLY PREVENTIVE VISIT  05/20/2025    PHQ-9  07/14/2025    GLUCOSE  10/03/2027    ADVANCE CARE PLANNING  05/20/2029    DTAP/TDAP/TD IMMUNIZATION (9 - Td or Tdap) 07/30/2034    ZOSTER IMMUNIZATION (1 of 2) 05/02/2042    INFLUENZA VACCINE  Completed    HEPATITIS B IMMUNIZATION  Completed    Pneumococcal Vaccine: Pediatrics (0 to 5 Years) and At-Risk Patients (6 to 49 Years)  Aged Out    MENINGITIS IMMUNIZATION  Aged Out    HEPATITIS C SCREENING  Discontinued    HIV SCREENING  Discontinued    PAP  Discontinued    RSV VACCINE  Discontinued       Allergies   Allergen Reactions    Codeine Shortness Of Breath, Nausea and Vomiting and Rash    Doxycycline Dizziness and Rash       Objective:  Vitals: /72   Wt 73.5 kg (162 lb)   LMP 02/05/2025 (Approximate)   Breastfeeding Yes   BMI 29.63 kg/m    BMI= Body mass index is 29.63 kg/m .  External genitalia without lesions    Vagina negative throughout course.  Cervix multiparous with no lesions or active bleeding.  Pap obtained.  Healing status post LEEP is excellent.    Assessment/Plan:  1. KAELA III with severe dysplasia (Primary)    - HPV and Gynecologic Cytology Panel - Recommended Age 30-65 Years    2. Screening for malignant neoplasm of cervix    - HPV and Gynecologic Cytology Panel - Recommended Age 30-65 Years        Jorge Muñoz MD   Home

## 2025-03-10 ENCOUNTER — VIRTUAL VISIT (OUTPATIENT)
Facility: CLINIC | Age: 35
End: 2025-03-10
Payer: COMMERCIAL

## 2025-03-10 DIAGNOSIS — F41.8 POSTPARTUM ANXIETY: ICD-10-CM

## 2025-03-10 DIAGNOSIS — F32.9 MAJOR DEPRESSIVE DISORDER, SINGLE EPISODE WITH PERIPARTUM ONSET: Primary | ICD-10-CM

## 2025-03-10 PROCEDURE — 90832 PSYTX W PT 30 MINUTES: CPT | Mod: 95

## 2025-03-10 NOTE — PROGRESS NOTES
"[facility]                                     Progress Note    Patient Name: Kate Gallo  Date: 3/10/2025         Service Type: Individual      Session Start Time: 11:12  Session End Time: 11:40     Session Length: 28    Session #: 6    Attendees: Client attended alone    Service Modality:  Video Visit:      Provider verified identity through the following two step process.  Patient provided:  Patient is known previously to provider and Patient was verified at admission/transfer    Telemedicine Visit: The patient's condition can be safely assessed and treated via synchronous audio and visual telemedicine encounter.      Reason for Telemedicine Visit: Patient has requested telehealth visit    Originating Site (Patient Location): Patient's home    Distant Site (Provider Location): Provider Remote Setting- Home Office    Consent:  The patient/guardian has verbally consented to: the potential risks and benefits of telemedicine (video visit) versus in person care; bill my insurance or make self-payment for services provided; and responsibility for payment of non-covered services.     Patient would like the video invitation sent by:  My Chart    Mode of Communication:  Video Conference via [facility]    Distant Location (Provider):  Off-site    As the provider I attest to compliance with applicable laws and regulations related to telemedicine.    DATA  Extended Session (53+ minutes): No  Interactive Complexity: No  Crisis: No         Progress Since Last Session (Related to Symptoms / Goals / Homework):   Symptoms: Improving Pt reported \"feeling better overall\"     Homework: Achieved / completed to satisfaction      Episode of Care Goals: Minimal progress - ACTION (Actively working towards change); Intervened by reinforcing change plan / affirming steps taken     Current / Ongoing Stressors and Concerns:   2/04/25 Pt reported feeling guilty about leaving her baby for a temporary weekend getaway " "trip with spouse and friends.     According to Yuriy Paredes, LMFT on 12/18/2024 \" Presenting Concerns/ Current Stressors: Pt has reported increased mood lability and emotion responses post partum that are uncharacteristic and distressing. Pt reports these emotions occur most often related to Pt's new born crying and Pt gets support from  when overwhelmed. Pt has no thoughts of harming the infant and is not concerned for infant's safety. Pt gets down on self for having thoughts and emotions that are irregular for Pt.\"     Treatment Objective(s) Addressed in This Session:   use cognitive strategies identified in therapy to challenge anxious thoughts     Intervention:  Therapist created a safe and compassionate space while allowing the person to express their emotions at their own pace. 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     (MBSR) Therapist taught experiencing a new environment invites mindfulness by encouraging present moment awareness. Encouraged  Pt to practice observing their surroundings including sights, sounds, smells without judgment.    CBT - Psychoeducation: Therapist provided framework for understanding cognitive distortions to better understand how it can influence emotions.     CBT- Cognitive restructuring: Reframing Mom Guilt  Psychoeducation- Support systems: Encouraged talking to other mothers who have experienced similar thoughts and open communication with partners, family, or friends about the experience.     CBT- Therapist introduced Jennifer Diane s Self-Compassion Model. Therapist taught Jennifer Diane's model including  teachings of self-compassion, as client is  experiencing guilt related to postpartum distress: Mindfulness, common humanity, self-kindness.        Assessments completed prior to visit:  The following assessments were completed by patient for this visit:  PHQ2:       2/3/2025    10:30 PM " 1/29/2025     8:49 AM 1/22/2025    10:56 PM 3/6/2024     1:03 PM 2/11/2024     6:27 PM 2/9/2024     3:29 PM 5/16/2023     2:52 PM   PHQ-2 ( 1999 [employer])   Q1: Little interest or pleasure in doing things 0 0 0 1 0 0 0   Q2: Feeling down, depressed or hopeless 1 1 1 0 1 1 0   PHQ-2 Score 1  1  1  1 1 1 0   Q1: Little interest or pleasure in doing things Not at all Not at all Not at all Several days Not at all Not at all    Q2: Feeling down, depressed or hopeless Several days Several days Several days Not at all Several days Several days    PHQ-2 Score 1 1 1 1 1 1        Patient-reported     PHQ9:       2/22/2024    10:50 AM 8/29/2024     8:33 AM 12/18/2024     9:57 AM 1/13/2025     9:24 AM   PHQ-9 SCORE   PHQ-9 Total Score MyChart 5 (Mild depression) 12 (Moderate depression) 6 (Mild depression) 4 (Minimal depression)   PHQ-9 Total Score 5 12 6  4        Patient-reported     GAD2:       2/22/2024    11:09 AM 1/13/2025     9:24 AM   SHAHEED-2   Feeling nervous, anxious, or on edge 1 3   Not being able to stop or control worrying 1 2   SHAHEED-2 Total Score 2 5        Patient-reported     GAD7:       11/28/2022    10:19 AM 12/18/2024    12:56 PM 1/13/2025     9:24 AM   SHAHEED-7 SCORE   Total Score 4 (minimal anxiety) 10 (moderate anxiety) 11 (moderate anxiety)   Total Score 4 10  11        Patient-reported         ASSESSMENT: Current Emotional / Mental Status (status of significant symptoms):   Risk status (Self / Other harm or suicidal ideation)   Patient denies current fears or concerns for personal safety.   Patient denies current or recent suicidal ideation or behaviors.   Patient denies current or recent homicidal ideation or behaviors.   Patient denies current or recent self injurious behavior or ideation.   Patient denies other safety concerns.   Patient reports there has been no change in risk factors since their last session.     Patient reports there has been no change in protective factors since their last session.      Recommended that patient call 911 or go to the local ED should there be a change in any of these risk factors     Appearance:   Appropriate    Eye Contact:   Good    Psychomotor Behavior: Normal    Attitude:   Cooperative    Orientation:   All   Speech    Rate / Production: Normal     Volume:  Normal    Mood:    Anxious    Affect:    Appropriate    Thought Content:  Clear    Thought Form:  Coherent  Logical    Insight:    Good      Medication Review:   No current psychiatric medications prescribed     Medication Compliance:   NA     Changes in Health Issues:   None reported     Chemical Use Review:   Substance Use: Chemical use reviewed, no active concerns identified      Tobacco Use: No current tobacco use.      Diagnosis:  1. Major depressive disorder, single episode with peripartum onset    2. Postpartum anxiety            Collateral Reports Completed:   Authorization for Release of Information Completed for verbal for [facility]    PLAN: (Patient Tasks / Therapist Tasks / Other)  HW: Continue to utilize self compassion breaks.             Marilyn Chung, [facility]                                                         ______________________________________________________________________    Individual Treatment Plan    Patient's Name: Kate Gallo  YOB: 1990    Date of Creation: 1/23/2025  Date Treatment Plan Last Reviewed/Revised: n/a    DSM5 Diagnoses: 296.21 (F32.0) Major Depressive Disorder, Single Episode, Mild _ and With peripartum onset  Psychosocial / Contextual Factors: Experiences postpartum complications, has a supportive spouse, and both are navigating parenthood as first-time parents.   PROMIS (reviewed every 90 days):   PROMIS-10 Scores        2/22/2024    11:10 AM 12/18/2024    10:18 AM 1/13/2025     9:25 AM   PROMIS-10 Total Score w/o Sub Scores   PROMIS TOTAL - SUBSCORES 24 23  24        Patient-reported      Referral / Collaboration:  The following referral(s) will  be initiated: Mother baby program and patient will pursue PSI support group .    Anticipated number of session for this episode of care: 9-12 sessions  Anticipation frequency of session: Weekly  Anticipated Duration of each session: 38-52 minutes  Treatment plan will be reviewed in 90 days or when goals have been changed.       MeasurableTreatment Goal(s) related to diagnosis / functional impairment(s)  Goal 1: The client will develop and consistently use effective coping tools to manage stress related to caring for a new baby, focusing on managing sleep deprivation, balancing responsibilities, and handling overwhelming emotions.    I will know I've met my goal when I'm spending less time away from my baby.      Objective #A (Patient Action)    The client will practice and implement the 5-4-3-2-1 grounding technique at least three times per week when feeling overwhelmed, documenting each instance and its effectiveness in a journal or notes for review.  Status: New - Date: 1/23/2025      Intervention(s)  The therapist will teach and role-play the 5-4-3-2-1 grounding technique in session, providing guidance on its application in stressful moments. The therapist will also assist the client in identifying patterns of overwhelm through discussion and provide a journal template to track progress and reflect on its use between sessions.    Patient has reviewed and agreed to the above plan.      Mairlyn Chung, [facility]  January 23, 2025

## 2025-03-11 ENCOUNTER — MYC MEDICAL ADVICE (OUTPATIENT)
Dept: MIDWIFE SERVICES | Facility: CLINIC | Age: 35
End: 2025-03-11
Payer: COMMERCIAL

## 2025-03-12 LAB
BKR AP ASSOCIATED HPV REPORT: NORMAL
BKR LAB AP GYN ADEQUACY: NORMAL
BKR LAB AP GYN INTERPRETATION: NORMAL
BKR LAB AP LMP: NORMAL
BKR LAB AP PREVIOUS ABNL DX: NORMAL
BKR LAB AP PREVIOUS ABNORMAL: NORMAL
PATH REPORT.COMMENTS IMP SPEC: NORMAL
PATH REPORT.COMMENTS IMP SPEC: NORMAL
PATH REPORT.RELEVANT HX SPEC: NORMAL

## 2025-03-16 LAB
HPV HR 12 DNA CVX QL NAA+PROBE: NEGATIVE
HPV16 DNA CVX QL NAA+PROBE: NEGATIVE
HPV18 DNA CVX QL NAA+PROBE: NEGATIVE
HUMAN PAPILLOMA VIRUS FINAL DIAGNOSIS: NORMAL
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
## 2025-03-19 ASSESSMENT — EDINBURGH POSTNATAL DEPRESSION SCALE (EPDS)
TOTAL SCORE: 6
I HAVE FELT SAD OR MISERABLE: NOT VERY OFTEN
THE THOUGHT OF HARMING MYSELF HAS OCCURRED TO ME: NEVER
I HAVE LOOKED FORWARD WITH ENJOYMENT TO THINGS: AS MUCH AS I EVER DID
I HAVE BLAMED MYSELF UNNECESSARILY WHEN THINGS WENT WRONG: NOT VERY OFTEN
I HAVE BEEN SO UNHAPPY THAT I HAVE BEEN CRYING: NO, NEVER
THINGS HAVE BEEN GETTING ON TOP OF ME: NO, MOST OF THE TIME I HAVE COPED QUITE WELL
I HAVE BEEN ANXIOUS OR WORRIED FOR NO GOOD REASON: YES, SOMETIMES
I HAVE FELT SCARED OR PANICKY FOR NO GOOD REASON: NO, NOT MUCH
I HAVE BEEN ABLE TO LAUGH AND SEE THE FUNNY SIDE OF THINGS: AS MUCH AS I ALWAYS COULD
I HAVE BEEN SO UNHAPPY THAT I HAVE HAD DIFFICULTY SLEEPING: NOT AT ALL

## 2025-03-20 ENCOUNTER — VIRTUAL VISIT (OUTPATIENT)
Dept: MIDWIFE SERVICES | Facility: CLINIC | Age: 35
End: 2025-03-20
Payer: COMMERCIAL

## 2025-03-20 DIAGNOSIS — O92.79 INSUFFICIENT LACTATION: Primary | ICD-10-CM

## 2025-03-20 NOTE — PATIENT INSTRUCTIONS
As babies get older, it is very normal for them to be very distractible at the breast.  They are also very efficient nursers, and can often get all the milk they need at a feeding in 5 - 10 minutes.  When you nurse Jana, try taking her in a calm, dark area with minimal distractions, and nurse her for as long as she is willing.  You can also try feeding her when she is really drowsy.  Once she is busy and stops, it is OK to end the feeding, and she may very well not need a bottle afterwards.  If you would like to increase your milk supply and return to more breastfeeding, your body may do this. It may take some additional effort (which you may or may not want to make)--you could nurse Jana whenever possible at home, make an effort to pump 2-3 times every workday, and perhaps even add a pump session or two on days off.  Your supply may increase to the point that you do not need as much formula.  Alternatively, if you are now nursing Jana when you are home and using your twice-daily pumped milk for some of her bottles, it is possible to just continue this indefinitely.  Your body will make what you take out of it, and as long as you are removing milk regularly (nursing or pumping), you should be able to maintain this milk supply.  You do not need to wean, and your body does not continue to decrease supply as long as you are continuing to remove milk.  Once babies are about a month old, they need about 25-30 oz/day (or 5-6 oz each feeding if she is eating 5 times/day) and this where their needs stay for their entire first year;  you don't need to keep producing more and more milk as they grow.  If she nurses from you four times/day and takes one or two bottles, she may not need any formula on your days home with her.  Bear in mind that breastmilk is mor easily digested than formula, so if you are giving more breastmilk and less formula in bottles, she may need to eat a bit more frequently.  When you are ready to  move to full weaning, consider first eliminating pumping:  Choose the pumping session that you most want to drop, and instead of pumping until 4-5 oz come, stop after 3-4 oz.  Then drop back by another ounce, and then another, until you can comfortably drop that pumping entirely.  You can drop back on each session this way, until you are no longer pumping at all and are fully using formula for bottles.  If you choose, you can stop here, and give formula for bottles and breastfeed when you are home. You can pause the process at any point.    Once you are no longer pumping, you can begin weaning from breastfeeding when you like.  Again, choose the nursing session that your baby is least attached to (this is often one of the midday sessions--babies usually drop the wake-up and bedtime nursing sessions last).  Instead of nursing for the time you usually do--say, 10 minutes--stop after just a bit less time, and substitute in a little expressed milk or formula in a bottle.  Over the course of a few days, decrease how long you nurse and give more in the bottle, until you have moved completely to bottlefeeding at that time.  You can work through each session in this way, until your breasts are no longer producing much milk and your baby is taking his milk either by bottle or cup.  While you do this, talk to your baby about how much they are growing up, and congratulate yourself on a completed nursing adventure!   Follow up with lactation as needed, and pediatric provider as planned.  Natanael Ulien can be used for brief questions, but it's important to know that messages are not seen Friday through Sunday. If urgent help is needed, Monday through Friday you can call [phone] and one of our lactation consultants will get the message and respond; if you need a rapid response over a weekend or holiday, it is best to call your on-call maternity or pediatric provider.  Please feel free to schedule a return visit if the concern is  more detailed;  telephone visits are also an option if you don't feel you need to be seen in person.   ____________________  Changes in Breastfeeding at Four Months  https://www.laleche.org.uk/what-happens-at-four-months/#:~:text=Then%20around%20four%20months%20(or,in%20your%20baby%20right%20now.      Sleep and Breastfeeding the Four Month Old  [email or web address]://[email or web address]-[email or web address]       Nursing the Distractible Older Baby  [email or web address]://[email or web address]-[email or web address]/[email or web address]-baby/
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
## 2025-04-17 ENCOUNTER — OFFICE VISIT (OUTPATIENT)
Dept: MIDWIFE SERVICES | Facility: CLINIC | Age: 35
End: 2025-04-17
Payer: COMMERCIAL

## 2025-04-17 VITALS — RESPIRATION RATE: 16 BRPM | SYSTOLIC BLOOD PRESSURE: 114 MMHG | DIASTOLIC BLOOD PRESSURE: 66 MMHG | HEART RATE: 80 BPM

## 2025-04-17 DIAGNOSIS — O92.79 OTHER DISORDERS OF LACTATION: Primary | ICD-10-CM

## 2025-04-17 ASSESSMENT — EDINBURGH POSTNATAL DEPRESSION SCALE (EPDS)
TOTAL SCORE: 6
THE THOUGHT OF HARMING MYSELF HAS OCCURRED TO ME: NEVER
I HAVE LOOKED FORWARD WITH ENJOYMENT TO THINGS: AS MUCH AS I EVER DID
I HAVE BLAMED MYSELF UNNECESSARILY WHEN THINGS WENT WRONG: NOT VERY OFTEN
I HAVE FELT SAD OR MISERABLE: NO, NOT AT ALL
I HAVE FELT SCARED OR PANICKY FOR NO GOOD REASON: YES, SOMETIMES
I HAVE BEEN SO UNHAPPY THAT I HAVE HAD DIFFICULTY SLEEPING: NOT AT ALL
I HAVE BEEN ANXIOUS OR WORRIED FOR NO GOOD REASON: YES, SOMETIMES
I HAVE BEEN ABLE TO LAUGH AND SEE THE FUNNY SIDE OF THINGS: AS MUCH AS I ALWAYS COULD
I HAVE BEEN SO UNHAPPY THAT I HAVE BEEN CRYING: NO, NEVER
THINGS HAVE BEEN GETTING ON TOP OF ME: NO, MOST OF THE TIME I HAVE COPED QUITE WELL

## 2025-04-17 NOTE — PATIENT INSTRUCTIONS
"   Continue to feed Jana on cue as you have been.  She needs about 25 - 30 oz of milk each day to grow well.  If she nurses at home as she did in the office today, about 7 times/day, and takes two bottles of about 6 oz each, she is getting plenty of milk for good growth.  On workdays, five bottles of six ounces each is also an appropriate amount to feed.  It is not necessary for you to pump instead of nursing for the first and last feedings of the day--if you like, you can just nurse her at those times. If she seems restless or hungry, as she may take in less (most babies do not take 6 oz at the breast), consider just giving one additional bottle instead of two.  Remember that babies continue to need about 25-30 oz/day (or 5-6 oz each feeding if they eat about 6 times/day) for their entire first year. You do not need to increase the amounts you give in bottles as she gets older.   Continue pumping on your workdays as you have been, to have milk to offer to Jana while you are away.  Sometimes pumping while you have some warmth on your breasts (like a heating pad or microwaved hot pack) is helpful, and gentle massage can also help release more milk.   If you are happy with the current mix of formula and breastmilk, you can continue with that pattern.  If you would like to increase your supply, you could try adding a few more pumping sessions, or do a nighttime \"dream feed.\"   It is more effective to pump more frequently for shorter time periods than it is to pump less often for longer:  For example, it is better to pump 6 times/day for 15 minutes each than it is to pump 3 times/day for 30 minutes.     When you are ready to wean, consider first eliminating pumping while at work:  Choose the pumping session that you most want to drop, and instead of pumping until the usual amount comes, stop after 1 oz less.  Then drop back by another ounce, and then another, until you can comfortably drop that pumping entirely.  You " can drop back on each session this way, until you are no longer pumping at all during your workday.  Once you are no longer pumping, you can begin weaning from breastfeeding.  Again, choose the nursing session that your baby is least attached to (this is often one of the midday sessions--babies usually drop the wake-up and bedtime nursing sessions last).  Instead of nursing for the time you usually do--say, 10 minutes--stop after just a bit less time, and substitute in a little milk in a bottle or cup.  Over the course of a few days, decrease how long you nurse and give more in the bottle, until you have moved completely to bottlefeeding at that time.  You can work through each session in this way, until your breasts are no longer producing much milk and your baby is taking her milk either by bottle or cup.    Follow up with lactation as needed, and pediatric provider as planned.  [employer] can be used for brief questions, but it's important to know that messages are not seen Friday through Sunday. If urgent help is needed, Monday through Friday you can call [phone] and one of our lactation consultants will get the message and respond; if you need a rapid response over a weekend or holiday, it is best to call your on-call maternity or pediatric provider.  Please feel free to schedule a return visit if the concern is more detailed;  telephone visits are also an option if you don't feel you need to be seen in person.

## 2025-07-29 SDOH — HEALTH STABILITY: PHYSICAL HEALTH: ON AVERAGE, HOW MANY MINUTES DO YOU ENGAGE IN EXERCISE AT THIS LEVEL?: 30 MIN

## 2025-07-29 SDOH — HEALTH STABILITY: PHYSICAL HEALTH: ON AVERAGE, HOW MANY DAYS PER WEEK DO YOU ENGAGE IN MODERATE TO STRENUOUS EXERCISE (LIKE A BRISK WALK)?: 3 DAYS

## 2025-07-29 ASSESSMENT — PATIENT HEALTH QUESTIONNAIRE - PHQ9
10. IF YOU CHECKED OFF ANY PROBLEMS, HOW DIFFICULT HAVE THESE PROBLEMS MADE IT FOR YOU TO DO YOUR WORK, TAKE CARE OF THINGS AT HOME, OR GET ALONG WITH OTHER PEOPLE: SOMEWHAT DIFFICULT
SUM OF ALL RESPONSES TO PHQ QUESTIONS 1-9: 3
SUM OF ALL RESPONSES TO PHQ QUESTIONS 1-9: 3

## 2025-07-29 ASSESSMENT — SOCIAL DETERMINANTS OF HEALTH (SDOH): HOW OFTEN DO YOU GET TOGETHER WITH FRIENDS OR RELATIVES?: ONCE A WEEK

## 2025-07-30 ENCOUNTER — OFFICE VISIT (OUTPATIENT)
Dept: PEDIATRICS | Facility: CLINIC | Age: 35
End: 2025-07-30
Payer: COMMERCIAL

## 2025-07-30 VITALS
HEART RATE: 82 BPM | DIASTOLIC BLOOD PRESSURE: 73 MMHG | BODY MASS INDEX: 29.61 KG/M2 | RESPIRATION RATE: 16 BRPM | HEIGHT: 63 IN | SYSTOLIC BLOOD PRESSURE: 112 MMHG | TEMPERATURE: 97.5 F | WEIGHT: 167.1 LBS | OXYGEN SATURATION: 97 %

## 2025-07-30 DIAGNOSIS — Z13.220 SCREENING FOR LIPID DISORDERS: ICD-10-CM

## 2025-07-30 DIAGNOSIS — F41.8 POSTPARTUM ANXIETY: ICD-10-CM

## 2025-07-30 DIAGNOSIS — Z23 ENCOUNTER FOR ADMINISTRATION OF VACCINE: ICD-10-CM

## 2025-07-30 DIAGNOSIS — Z13.1 SCREENING FOR DIABETES MELLITUS: ICD-10-CM

## 2025-07-30 DIAGNOSIS — Z00.00 ROUTINE GENERAL MEDICAL EXAMINATION AT A HEALTH CARE FACILITY: Primary | ICD-10-CM

## 2025-07-30 LAB
CHOLEST SERPL-MCNC: 186 MG/DL
FASTING STATUS PATIENT QL REPORTED: YES
FASTING STATUS PATIENT QL REPORTED: YES
GLUCOSE SERPL-MCNC: 96 MG/DL (ref 70–99)
HDLC SERPL-MCNC: 57 MG/DL
LDLC SERPL CALC-MCNC: 110 MG/DL
NONHDLC SERPL-MCNC: 129 MG/DL
TRIGL SERPL-MCNC: 94 MG/DL

## 2025-07-30 PROCEDURE — 90471 IMMUNIZATION ADMIN: CPT | Performed by: NURSE PRACTITIONER

## 2025-07-30 PROCEDURE — 82947 ASSAY GLUCOSE BLOOD QUANT: CPT | Performed by: NURSE PRACTITIONER

## 2025-07-30 PROCEDURE — 1126F AMNT PAIN NOTED NONE PRSNT: CPT | Performed by: NURSE PRACTITIONER

## 2025-07-30 PROCEDURE — 3074F SYST BP LT 130 MM HG: CPT | Performed by: NURSE PRACTITIONER

## 2025-07-30 PROCEDURE — 36415 COLL VENOUS BLD VENIPUNCTURE: CPT | Performed by: NURSE PRACTITIONER

## 2025-07-30 PROCEDURE — 3078F DIAST BP <80 MM HG: CPT | Performed by: NURSE PRACTITIONER

## 2025-07-30 PROCEDURE — 90651 9VHPV VACCINE 2/3 DOSE IM: CPT | Performed by: NURSE PRACTITIONER

## 2025-07-30 PROCEDURE — 80061 LIPID PANEL: CPT | Performed by: NURSE PRACTITIONER

## 2025-07-30 PROCEDURE — 99395 PREV VISIT EST AGE 18-39: CPT | Mod: 25 | Performed by: NURSE PRACTITIONER

## 2025-07-30 ASSESSMENT — PAIN SCALES - GENERAL: PAINLEVEL_OUTOF10: NO PAIN (0)

## 2025-07-30 NOTE — PROGRESS NOTES
Preventive Care Visit  [facility] YANET Parekh CNP, Family Medicine  Jul 30, 2025      Assessment & Plan     Routine general medical examination at a health care facility  Age appropriate screening and preventative care have been addressed today. Vaccinations have been updated. Patient has been advised to follow a balanced diet. They have been advised to undertake routine aerobic activity. Recommend annual vision exams as well as biannual dental exams. They will follow up for annual physical again in one year.   - pap is up to date    Screening for diabetes mellitus  - Glucose    Screening for lipid disorders  - Lipid panel reflex to direct LDL Fasting    Postpartum anxiety  Is established with a therapist. Coping well at this time, will continue to monitor. Encouraged her to follow-up as needed.     Encounter for administration of vaccine  - HPV9 (GARDASIL 9)        Counseling  Appropriate preventive services were addressed with this patient via screening, questionnaire, or discussion as appropriate for fall prevention, nutrition, physical activity, Tobacco-use cessation, social engagement, weight loss and cognition.  Checklist reviewing preventive services available has been given to the patient.  Reviewed patient's diet, addressing concerns and/or questions.   She is at risk for lack of exercise and has been provided with information to increase physical activity for the benefit of her well-being.   She is at risk for psychosocial distress and has been provided with information to reduce risk.         Marco Paul is a 35 year old, presenting for the following:  Physical        7/30/2025     9:04 AM   Additional Questions   Roomed by Annabelle         7/30/2025     9:04 AM   Patient Reported Additional Medications   Patient reports taking the following new medications no          HPI          Advance Care Planning    Discussed advance care planning with patient; informed AVS has  link to HDmessaging.        7/29/2025   General Health   How would you rate your overall physical health? Good   Feel stress (tense, anxious, or unable to sleep) To some extent   (!) STRESS CONCERN      7/29/2025   Nutrition   Three or more servings of calcium each day? Yes   Diet: Regular (no restrictions)   How many servings of fruit and vegetables per day? (!) 2-3   How many sweetened beverages each day? 0-1         7/29/2025   Exercise   Days per week of moderate/strenous exercise 3 days   Average minutes spent exercising at this level 30 min         7/29/2025   Social Factors   Frequency of gathering with friends or relatives Once a week   Worry food won't last until get money to buy more No   Food not last or not have enough money for food? No   Do you have housing? (Housing is defined as stable permanent housing and does not include staying outside in a car, in a tent, in an abandoned building, in an overnight shelter, or couch-surfing.) Yes   Are you worried about losing your housing? No   Lack of transportation? No   Unable to get utilities (heat,electricity)? No         7/29/2025   Dental   Dentist two times every year? Yes       Today's PHQ-9 Score:       7/29/2025     1:20 PM   PHQ-9 SCORE   PHQ-9 Total Score MyChart 3 (Minimal depression)   PHQ-9 Total Score 3        Patient-reported         7/29/2025   Substance Use   Alcohol more than 3/day or more than 7/wk No   Do you use any other substances recreationally? No     Social History     Tobacco Use    Smoking status: Never    Smokeless tobacco: Never   Vaping Use    Vaping status: Never Used   Substance Use Topics    Alcohol use: Yes     Comment: Extemely minimal since giving birth    Drug use: Never          Mammogram Screening - Patient under 40 years of age: Routine Mammogram Screening not recommended.         7/29/2025   STI Screening   New sexual partner(s) since last STI/HIV test? No     History of abnormal Pap smear: YES - reflected in  Problem List and Health Maintenance accordingly        Latest Ref Rng & Units 3/4/2025     3:02 PM 3/7/2024    10:55 AM 2023     3:04 PM   PAP / HPV   PAP  Negative for Intraepithelial Lesion or Malignancy (NILM)  Negative for Intraepithelial Lesion or Malignancy (NILM)  Negative for Intraepithelial Lesion or Malignancy (NILM)    HPV 16 DNA Negative Negative  Negative  Negative    HPV 18 DNA Negative Negative  Negative  Negative    Other HR HPV Negative Negative  Negative  Negative            2025   Contraception/Family Planning   Questions about contraception or family planning No   What are your periods like? (!) PAINFUL (CRAMPING)        Reviewed and updated as needed this visit by Provider                    Patient Active Problem List   Diagnosis    Chronic neck pain    KAELA III with severe dysplasia     delivery delivered    Gestational diabetes mellitus (GDM), delivered, current hospitalization    Gestational hypertension without significant proteinuria, postpartum    Gestational hypertension     Past Surgical History:   Procedure Laterality Date    BIOPSY  10/19/22    Colposcopy     SECTION N/A 10/02/2024    Procedure: PRIMARY  SECTION;  Surgeon: Daniel Muñoz MD;  Location:  L+D    LEEP TX, CERVICAL      TONSILLECTOMY & ADENOIDECTOMY         Social History     Tobacco Use    Smoking status: Never    Smokeless tobacco: Never   Substance Use Topics    Alcohol use: Yes     Comment: Extemely minimal since giving birth     Family History   Problem Relation Age of Onset    No Known Problems Mother     Hyperlipidemia Father     Rheumatoid Arthritis Maternal Grandmother     Osteoporosis Maternal Grandmother     Diabetes Paternal Grandfather     Alcoholism Paternal Uncle     Alcoholism Paternal Uncle     Substance Abuse Paternal Uncle     Depression Other     Depression Other     Breast Cancer No family hx of     Colon Cancer No family hx of               Objective   "  Exam  /73 (BP Location: Right arm, Patient Position: Sitting, Cuff Size: Adult Large)   Pulse 82   Temp 97.5  F (36.4  C) (Temporal)   Resp 16   Ht 1.6 m (5' 3\")   Wt 75.8 kg (167 lb 1.6 oz)   LMP 07/25/2025 (Exact Date)   SpO2 97%   Breastfeeding No   BMI 29.60 kg/m     Estimated body mass index is 29.6 kg/m  as calculated from the following:    Height as of this encounter: 1.6 m (5' 3\").    Weight as of this encounter: 75.8 kg (167 lb 1.6 oz).    Physical Exam  Constitutional: appears to be in no acute distress, comfortable, pleasant.   Eyes: anicteric, conjunctiva clear without drainage or erythema. JULIET.   Ears, Nose and Throat: tympanic membranes gray with LR,  nose without nasal discharge. OP: no erythema to posterior pharynx, negative post nasal drainage, tonsils +1 no erythema or exudate.  Neck: supple, thyroid palpable,not enlarged, no nodules   Breast: Exam deferred (deferred after discussion of exam options with patient, no symptoms or concerns).   Cardiovascular: regular rate and rhythm, normal S1 and S2, no murmurs, rubs or gallops, peripheral pulses full and symmetric; negative peripheral edema   Respiratory: Air entry throughout. Breathing pattern unlabored without the use of accessory muscles. Clear to auscultation A and P, no wheezes or crackles, normal breath sounds.    Gastrointestinal: rounded, soft. Positive bowel sounds x4, nontender, no masses.   Genitourinary: Exam deferred (deferred after discussion of exam options with patient, no symptoms or concerns, pap is up to date).   Musculoskeletal: full range of motion, no edema.   Skin: pink, turgor smooth and elastic. Negative for lesions or dryness.  Neurological: normal gait, no tremor.   Psychological: appropriate mood and affect.   Lymphatic: no cervical, axillary, supraclavicular, or infraclavicular lymphadenopathy.          Signed Electronically by: YANET Salter CNP    Answers submitted by the patient for this " visit:  Patient Health Questionnaire (Submitted on 7/29/2025)  If you checked off any problems, how difficult have these problems made it for you to do your work, take care of things at home, or get along with other people?: Somewhat difficult  PHQ9 TOTAL SCORE: 3

## 2025-07-31 ENCOUNTER — RESULTS FOLLOW-UP (OUTPATIENT)
Dept: PEDIATRICS | Facility: CLINIC | Age: 35
End: 2025-07-31
Payer: COMMERCIAL

## (undated) DEVICE — CAP BABY PINK/BLUE IC-2

## (undated) DEVICE — SOL WATER IRRIG 1000ML BOTTLE 2F7114

## (undated) DEVICE — STOCKING SLEEVE VASOPRESS COMPRESSION CALF MED VP501M

## (undated) DEVICE — SOL NACL 0.9% IRRIG 1000ML BOTTLE 2F7124

## (undated) DEVICE — LINEN BABY BLANKET 5434

## (undated) DEVICE — LINEN HALF SHEET 5512

## (undated) DEVICE — SU MONOCRYL 4-0 PS-2 27" UND Y426H

## (undated) DEVICE — BLADE CLIPPER SGL USE 9680

## (undated) DEVICE — PREP CHLORAPREP 26ML TINTED ORANGE  260815

## (undated) DEVICE — PACK C-SECTION LF PL15OTA83B

## (undated) DEVICE — Device

## (undated) DEVICE — BAG CLEAR TRASH 1.3M 39X33" P4040C

## (undated) DEVICE — GLOVE PROTEXIS W/NEU-THERA 7.5  2D73TE75

## (undated) DEVICE — ESU GROUND PAD ADULT W/CORD E7507

## (undated) DEVICE — LINEN TOWEL PACK X10 5473

## (undated) DEVICE — SU VICRYL 0 CT-1 36" J346H

## (undated) DEVICE — CATH TRAY FOLEY 16FR SILICONE 907416

## (undated) DEVICE — SU PLAIN 3-0 CT 27" 852H

## (undated) DEVICE — LINEN FULL SHEET 5511

## (undated) RX ORDER — FENTANYL CITRATE-0.9 % NACL/PF 10 MCG/ML
PLASTIC BAG, INJECTION (ML) INTRAVENOUS
Status: DISPENSED
Start: 2024-10-02

## (undated) RX ORDER — MORPHINE SULFATE 1 MG/ML
INJECTION, SOLUTION EPIDURAL; INTRATHECAL; INTRAVENOUS
Status: DISPENSED
Start: 2024-10-02

## (undated) RX ORDER — OXYTOCIN/0.9 % SODIUM CHLORIDE 30/500 ML
PLASTIC BAG, INJECTION (ML) INTRAVENOUS
Status: DISPENSED
Start: 2024-10-02

## (undated) RX ORDER — ONDANSETRON 2 MG/ML
INJECTION INTRAMUSCULAR; INTRAVENOUS
Status: DISPENSED
Start: 2024-10-02

## (undated) RX ORDER — PROPOFOL 10 MG/ML
INJECTION, EMULSION INTRAVENOUS
Status: DISPENSED
Start: 2024-10-02

## (undated) RX ORDER — KETOROLAC TROMETHAMINE 30 MG/ML
INJECTION, SOLUTION INTRAMUSCULAR; INTRAVENOUS
Status: DISPENSED
Start: 2024-10-02

## (undated) RX ORDER — DEXAMETHASONE SODIUM PHOSPHATE 4 MG/ML
INJECTION, SOLUTION INTRA-ARTICULAR; INTRALESIONAL; INTRAMUSCULAR; INTRAVENOUS; SOFT TISSUE
Status: DISPENSED
Start: 2024-10-02